# Patient Record
Sex: MALE | Race: BLACK OR AFRICAN AMERICAN | URBAN - METROPOLITAN AREA
[De-identification: names, ages, dates, MRNs, and addresses within clinical notes are randomized per-mention and may not be internally consistent; named-entity substitution may affect disease eponyms.]

---

## 2024-08-05 ENCOUNTER — PREP FOR PROCEDURE (OUTPATIENT)
Dept: ORTHOPEDIC SURGERY | Facility: CLINIC | Age: 29
End: 2024-08-05

## 2024-08-05 DIAGNOSIS — G80.0 SPASTIC QUADRIPLEGIC CEREBRAL PALSY (MULTI): Primary | ICD-10-CM

## 2024-08-05 DIAGNOSIS — G80.0 CP (CEREBRAL PALSY), SPASTIC, QUADRIPLEGIC (MULTI): Primary | ICD-10-CM

## 2024-08-06 PROBLEM — G80.0 SPASTIC QUADRIPLEGIC CEREBRAL PALSY (MULTI): Status: ACTIVE | Noted: 2024-08-05

## 2024-09-16 RX ORDER — LEVETIRACETAM 750 MG/1
1500 TABLET ORAL 2 TIMES DAILY
Status: ON HOLD | COMMUNITY

## 2024-09-16 RX ORDER — LACOSAMIDE 200 MG/1
50 TABLET ORAL 2 TIMES DAILY
Status: ON HOLD | COMMUNITY

## 2024-09-16 RX ORDER — FOLIC ACID 1 MG/1
TABLET ORAL DAILY
Status: ON HOLD | COMMUNITY

## 2024-09-16 RX ORDER — METOPROLOL TARTRATE 25 MG/1
25 TABLET, FILM COATED ORAL 2 TIMES DAILY
Status: ON HOLD | COMMUNITY

## 2024-09-16 RX ORDER — BISMUTH SUBSALICYLATE 262 MG
1 TABLET,CHEWABLE ORAL DAILY
Status: ON HOLD | COMMUNITY

## 2024-09-16 RX ORDER — IPRATROPIUM BROMIDE AND ALBUTEROL SULFATE 2.5; .5 MG/3ML; MG/3ML
3 SOLUTION RESPIRATORY (INHALATION)
Status: ON HOLD | COMMUNITY

## 2024-09-16 RX ORDER — DOCUSATE SODIUM 50 MG/5ML
50 LIQUID ORAL
Status: ON HOLD | COMMUNITY

## 2024-09-16 RX ORDER — DIAZEPAM 10 MG/100UL
1 SPRAY NASAL ONCE AS NEEDED
Status: ON HOLD | COMMUNITY

## 2024-09-16 RX ORDER — UBIDECARENONE 75 MG
500 CAPSULE ORAL DAILY
Status: ON HOLD | COMMUNITY

## 2024-09-16 RX ORDER — BISACODYL 5 MG
5 TABLET, DELAYED RELEASE (ENTERIC COATED) ORAL DAILY PRN
Status: ON HOLD | COMMUNITY

## 2024-09-16 RX ORDER — SENNOSIDES 8.8 MG/5ML
LIQUID ORAL NIGHTLY
Status: ON HOLD | COMMUNITY

## 2024-09-16 RX ORDER — ERGOCALCIFEROL 1.25 MG/1
1.25 CAPSULE ORAL WEEKLY
Status: ON HOLD | COMMUNITY

## 2024-09-16 RX ORDER — CLOBAZAM 20 MG/1
20 TABLET ORAL NIGHTLY
Status: ON HOLD | COMMUNITY

## 2024-09-16 RX ORDER — BACLOFEN 20 MG/1
TABLET ORAL 3 TIMES DAILY
Status: ON HOLD | COMMUNITY

## 2024-09-16 RX ORDER — AMLODIPINE BESYLATE 5 MG/1
7.5 TABLET ORAL DAILY
Status: ON HOLD | COMMUNITY

## 2024-09-16 ASSESSMENT — ENCOUNTER SYMPTOMS: SEIZURES: 1

## 2024-09-16 NOTE — PREPROCEDURE INSTRUCTIONS
Pre-Op Instructions &?Checklist      Your surgery has been scheduled at [facility] at [address] [place], [address], [address], in the [facility]. Parking is to the left of the main entrance.     You will be contacted about the time of your surgery the day before your surgery (if your surgery is on a Monday you will be called the Friday before surgery). If you are unable to answer the phone, a detailed voicemail message will be left. Make sure that your voicemail box is not full so a message can be left. If you have not received a call by 3:00 pm you may call [phone] between the hours of 3:00 and 4:00 pm. Please be available by phone the night before/day of surgery in case there is a change in the schedule which may require you to arrive earlier/later.     ?     14 DAYS BEFORE SURGERY STOP TAKING WEIGHT LOSS MEDICATIONS      ?     7 DAYS BEFORE SURGERY STOP THESE MEDICATIONS:     Multiple Vitamins containing Vitamin E     Herbal supplements, Fish Oil, garlic pills, turmeric, CoQ enzyme     Stop taking aspirin, and aspirin-containing products, and NSAIDs. You may continue to take Acetaminophen (Tylenol).     If you are currently taking Coumadin/Warfarin, we will have to coordinate that with your PCP &/or the Anticoagulation Clinic.           THE DAY BEFORE SURGERY:     Do not eat any food after midnight the night before surgery.      You are permitted to have no more than 4 ounces of clear liquids such as water, apple juice, plain tea or coffee (no milk or creamer), clear electrolyte-replenishing drinks such as Pedialyte, Gatorade, or Powerade (not yogurt or pulp-containing smoothies or juices such as orange juice) up to 3 hours before your arrival time.               DAY OF SURGERY TAKE THESE MEDICATIONS (if it is not listed, do not take it.)      Take: Lacosamide; levetiracetam; Epidiolex; Amlodipine; Metoprolol; Baclofen if needed.  Give Vargas a Duo-Neb breathing  treatment before leaving the house.     If taking medications in tablet/capsule form take with a small sip of water.          ON THE MORNING OF SURGERY:     *Shower either the night before your surgery or the morning of your surgery     *Do not use moisturizers, creams, lotions or perfume, or make-up.     *Wear comfortable, loose fitting clothing.      *All jewelry and valuables should be left at home.     *Prosthetic devices such as contact lenses, hearing aids, dentures, eyelash extensions, hairpins and body piercings must be removed before surgery. Bring containers for eyeglasses/contacts, dentures, or hearing aids with you.     ?     Diabetics: Please check fasting blood sugars upon waking up. ?If fasting blood sugars are <80ml/dl, please drink 100ml/3oz. of apple juice no later than 2 hours prior to surgery.     ?     ?     BRING WITH YOU:      *Photo ID and insurance card     *Current list of medicines and allergies     *Pacemaker/Defibrillator/Heart stent cards     *Copy of your complete Advanced Directive/DHPOA-if applicable     ?     SMOKING:     *Quitting smoking can make a huge difference to your health and recovery from surgery. ?     *If you need help with quitting, call 2-204-QUIT-NOW.     Alcohol:     *No alcoholic beverages for 48 hours before surgery.     ?     AFTER OUTPATIENT SURGERY:     *A responsible adult MUST accompany you at the time of discharge and stay with you for 24 hours after your surgery.     *You may NOT drive yourself home after surgery.     * You may use a taxi or ride sharing service ([employer], Uber) to return home ONLY if you are accompanied by a friend or family member     *Instructions for resuming your medications will be provided by your surgeon.     ?     CONTACT SURGEON'S OFFICE IF YOU DEVELOP:     * Fever =/>?100.4 F      * New respiratory symptoms (e.g. cough, shortness of breath, respiratory distress, sore throat)     * Recent loss of taste or smell     *Flu like symptoms  such as headache, fatigue or gastrointestinal symptoms     * If you develop any open sores, shingles, burning or painful urination      AND/OR:     * You no longer wish to have the surgery.     * Any other personal circumstances change that may lead to the need to cancel or defer this surgery.     *You were admitted to any hospital within one week of your planned procedure.     ?     If you have any questions regarding these preoperative instructions you may call [phone]. If you have questions regarding you surgical procedure, or post-operative care/recovery please call your surgeon's office.          Link to [facility] Laboratory Services Locations     [email or web address]               Link to [facility] [employer] [email or web address]

## 2024-09-16 NOTE — CPM/PAT H&P
CPM/PAT Evaluation       Name: Vargas Romero   /Age: 1995       TELEMEDICINE ENCOUNTER  Patient was interviewed by telephone for preadmission testing perioperative risk assessment prior to surgery.    DATE OF CONSULT: 2024  REFERRING PROVIDER: Dr. Korina Blackman  SURGERY, DATE, AND LENGTH: Baclofen pump replacement; 10/04/2024; 90 minutes    CHIEF COMPLAINT  Spastic quadriplegic cerebral palsy    HPI  Vargas Romero is a 29-year-old male whose medical history is provided by his mother, Leyla Romero.  Patient with spastic quadriplegic cerebral palsy, seizure disorder, and severe intellectual disability.  Patient has baclofen pump with a battery that is near its end.  Patient is scheduled for baclofen pump replacement.    ACTIVE PROBLEMS  Patient Active Problem List   Diagnosis    Spastic quadriplegic cerebral palsy (Multi)        PAST MEDICAL HISTORY  Past Medical History:   Diagnosis Date    Allergic rhinitis     Dysphagia     Essential hypertension     GERD (gastroesophageal reflux disease)     Intractable seizure disorder (Multi)     Malnutrition (Multi)     PEG (percutaneous endoscopic gastrostomy) status (Multi)     Seasonal allergies     Severe intellectual disability         SURGICAL HISTORY  Past Surgical History:   Procedure Laterality Date    OTHER SURGICAL HISTORY      Orchiectomy    OTHER SURGICAL HISTORY      Placement of baclofen pump    OTHER SURGICAL HISTORY      Tendon release    OTHER SURGICAL HISTORY      PEG        ANESTHESIA HISTORY  Denies problems with anesthesia in the past such as PONV, prolonged sedation, awareness, dental damage, aspiration, cardiac arrest, difficult intubation, or unexpected hospital admissions. Denies family history of malignant hyperthermia, or pseudocholinesterase deficiency.     SOCIAL HISTORY  Patient lives with his mother who is his primary caretaker.    FAMILY HISTORY  No family history on file.     ALLERGIES  No Known Allergies      MEDICATIONS  No current facility-administered medications for this encounter.    Current Outpatient Medications:     amLODIPine (Norvasc) 5 mg tablet, Take 1.5 tablets (7.5 mg) by mouth once daily., Disp: , Rfl:     bisacodyl (Dulcolax) 5 mg EC tablet, Take 1 tablet (5 mg) by mouth once daily as needed for constipation. Do not crush, chew, or split., Disp: , Rfl:     cloBAZam (Onfi) 20 mg tablet, Take 1 tablet (20 mg) by mouth once daily at bedtime., Disp: , Rfl:     cyanocobalamin (Vitamin B-12) 500 mcg tablet, Take 1 tablet (500 mcg) by mouth once daily., Disp: , Rfl:     docusate sodium (Colace) 50 mg/5 mL oral liquid, Take 5 mL (50 mg) by mouth., Disp: , Rfl:     ergocalciferol (Vitamin D-2) 1.25 MG (08457 UT) capsule, Take 1 capsule (1,250 mcg) by mouth 1 (one) time per week., Disp: , Rfl:     folic acid (Folvite) 1 mg tablet, Take by mouth once daily., Disp: , Rfl:     ipratropium-albuteroL (Duo-Neb) 0.5-2.5 mg/3 mL nebulizer solution, Take 3 mL by nebulization every 6 hours., Disp: , Rfl:     lacosamide (Vimpat) 200 mg tablet tablet, 0.25 tablets (50 mg) 2 times a day., Disp: , Rfl:     levETIRAcetam (Keppra) 750 mg tablet, Take 2 tablets (1,500 mg) by mouth 2 times a day., Disp: , Rfl:     metoprolol tartrate (Lopressor) 25 mg tablet, Take 1 tablet (25 mg) by mouth 2 times a day., Disp: , Rfl:     multivitamin tablet, Take 1 tablet by mouth once daily., Disp: , Rfl:     senna (Senokot) 8.8 mg/5 mL syrup, Take by mouth once daily at bedtime., Disp: , Rfl:     baclofen (Lioresal) 20 mg tablet, Take by mouth 3 times a day., Disp: , Rfl:     cannabidiol (Epidiolex) 100 mg/mL solution, Take by mouth., Disp: , Rfl:     diazePAM (Valtoco) 20 mg/2 spray spray,non-aerosol nasal spray, Administer 1 spray into each nostril 1 time if needed for seizures., Disp: , Rfl:      REVIEW OF SYSTEMS  Review of Systems   Neurological:  Positive for seizures.        Spastic quadriplegia cerebral palsy   All other systems  reviewed and are negative.    STOP BANG: patient with sleep apnea is unable to tolerate BiPAP      PHYSICAL EXAM  Deferred    AIRWAY EXAM  Deferred    VITALS  No vitals taken for telemedicine visit  BMI Readings from Last 1 Encounters:   No data found for BMI      BP Readings from Last 4 Encounters:   No data found for BP        LABS  CBC from 06/05/2024  ntains abnormal data CBC WITH DIFFERENTIAL  Order: [id number]  Component  Ref Range & Units 3 mo ago   WBC  4.5 - 11.5 K/uL 5.8   RBC  4.50 - 5.90 M/uL 4.68   Hemoglobin  13.9 - 16.3 g/dL 12.2 Low    Hematocrit  41.0 - 53.0 % 38.2 Low    MCV  80 - 100 fL 82   MCH  26.0 - 34.0 pg 26.0   MCHC  32.0 - 35.9 g/dL 31.8 Low    Platelet  150 - 400 K/uL 180   RDW-CV  11.5 - 14.5 % 13.4   MPV  7.5 - 11.2 fL 10.3   Neutrophils  31.0 - 76.0 % 61.8   Neutrophil #  1.50 - 8.00 K/uL 3.58   Lymphocytes  24.0 - 44.0 % 28.6   Lymphocytes #  1.00 - 4.80 K/uL 1.65   Monocytes  2.0 - 11.0 % 8.5   Monocyte #  0.20 - 1.00 K/uL 0.49   Eosinophil  0.1 - 4.0 % 0.8   Eosinophil #  0.00 - 0.70 K/uL 0.05   Basophils  <=1.9 % 0.3   Basophil #  0.00 - 0.20 K/uL 0.02     BMP from 06/05/2024  ntains abnormal data BASIC METABOLIC PANEL  Order: [id number]  Component  Ref Range & Units 3 mo ago   Glucose  74 - 109 mg/dL 85   Sodium  136 - 145 mmol/L 141   Potassium  3.5 - 5.0 mmol/L 4.3   Carbon Dioxide  21 - 31 mmol/L 27   Chloride  98 - 107 mmol/L 108 High    Blood Urea Nitrogen  7 - 25 mg/dL 16   Creatinine  0.70 - 1.30 mg/dL 0.55 Low    Calcium  8.6 - 10.3 mg/dL 9.4   Anion Gap  10 - 20 10   Estimated GFR (CKD-EPI)  >=60 mL/min/1.73sqm 136         ASSESSMENT/PLAN  Spastic quadriplegia cerebral palsy; baclofen pump near end-of-life battery  Replacement of baclofen pump    This note was created in part upon personal review of patient's medical records.  Speech recognition transcription software was used in the creation of this note. Despite proofreading, several typographical errors might be present  that might affect the meaning of the content.

## 2024-10-03 ENCOUNTER — ANESTHESIA EVENT (OUTPATIENT)
Dept: OPERATING ROOM | Facility: CLINIC | Age: 29
End: 2024-10-03
Payer: MEDICAID

## 2024-10-04 ENCOUNTER — SURGERY (OUTPATIENT)
Age: 29
End: 2024-10-04
Payer: MEDICAID

## 2024-10-04 ENCOUNTER — HOSPITAL ENCOUNTER (OUTPATIENT)
Facility: CLINIC | Age: 29
Setting detail: OUTPATIENT SURGERY
Discharge: HOME | End: 2024-10-04
Attending: ORTHOPAEDIC SURGERY | Admitting: ORTHOPAEDIC SURGERY
Payer: MEDICAID

## 2024-10-04 ENCOUNTER — ANESTHESIA (OUTPATIENT)
Dept: OPERATING ROOM | Facility: CLINIC | Age: 29
End: 2024-10-04
Payer: MEDICAID

## 2024-10-04 VITALS
WEIGHT: 96.12 LBS | RESPIRATION RATE: 16 BRPM | OXYGEN SATURATION: 98 % | SYSTOLIC BLOOD PRESSURE: 157 MMHG | TEMPERATURE: 97.3 F | HEART RATE: 68 BPM | DIASTOLIC BLOOD PRESSURE: 93 MMHG

## 2024-10-04 DIAGNOSIS — G80.0 SPASTIC QUADRIPLEGIC CEREBRAL PALSY (MULTI): Primary | ICD-10-CM

## 2024-10-04 PROBLEM — G82.50 QUADRIPLEGIA: Status: ACTIVE | Noted: 2024-10-04

## 2024-10-04 PROBLEM — I10 HTN (HYPERTENSION): Status: ACTIVE | Noted: 2024-10-04

## 2024-10-04 PROBLEM — R56.9 SEIZURES (MULTI): Status: ACTIVE | Noted: 2024-10-04

## 2024-10-04 PROCEDURE — C1772 INFUSION PUMP, PROGRAMMABLE: HCPCS | Performed by: ORTHOPAEDIC SURGERY

## 2024-10-04 PROCEDURE — 3600000002 HC OR TIME - INITIAL BASE CHARGE - PROCEDURE LEVEL TWO: Performed by: ORTHOPAEDIC SURGERY

## 2024-10-04 PROCEDURE — 2500000004 HC RX 250 GENERAL PHARMACY W/ HCPCS (ALT 636 FOR OP/ED): Mod: SE

## 2024-10-04 PROCEDURE — 2500000004 HC RX 250 GENERAL PHARMACY W/ HCPCS (ALT 636 FOR OP/ED): Mod: SE | Performed by: ORTHOPAEDIC SURGERY

## 2024-10-04 PROCEDURE — 2500000004 HC RX 250 GENERAL PHARMACY W/ HCPCS (ALT 636 FOR OP/ED): Mod: SE | Performed by: ANESTHESIOLOGIST ASSISTANT

## 2024-10-04 PROCEDURE — 62362 IMPLANT SPINE INFUSION PUMP: CPT | Performed by: ORTHOPAEDIC SURGERY

## 2024-10-04 PROCEDURE — A4649 SURGICAL SUPPLIES: HCPCS | Performed by: ORTHOPAEDIC SURGERY

## 2024-10-04 PROCEDURE — 7100000002 HC RECOVERY ROOM TIME - EACH INCREMENTAL 1 MINUTE: Performed by: ORTHOPAEDIC SURGERY

## 2024-10-04 PROCEDURE — 7100000010 HC PHASE TWO TIME - EACH INCREMENTAL 1 MINUTE: Performed by: ORTHOPAEDIC SURGERY

## 2024-10-04 PROCEDURE — 2720000007 HC OR 272 NO HCPCS: Performed by: ORTHOPAEDIC SURGERY

## 2024-10-04 PROCEDURE — 2780000003 HC OR 278 NO HCPCS: Performed by: ORTHOPAEDIC SURGERY

## 2024-10-04 PROCEDURE — C1751 CATH, INF, PER/CENT/MIDLINE: HCPCS | Performed by: ORTHOPAEDIC SURGERY

## 2024-10-04 PROCEDURE — 3600000007 HC OR TIME - EACH INCREMENTAL 1 MINUTE - PROCEDURE LEVEL TWO: Performed by: ORTHOPAEDIC SURGERY

## 2024-10-04 PROCEDURE — 3700000001 HC GENERAL ANESTHESIA TIME - INITIAL BASE CHARGE: Performed by: ORTHOPAEDIC SURGERY

## 2024-10-04 PROCEDURE — 7100000009 HC PHASE TWO TIME - INITIAL BASE CHARGE: Performed by: ORTHOPAEDIC SURGERY

## 2024-10-04 PROCEDURE — 3700000002 HC GENERAL ANESTHESIA TIME - EACH INCREMENTAL 1 MINUTE: Performed by: ORTHOPAEDIC SURGERY

## 2024-10-04 PROCEDURE — 7100000001 HC RECOVERY ROOM TIME - INITIAL BASE CHARGE: Performed by: ORTHOPAEDIC SURGERY

## 2024-10-04 PROCEDURE — 2500000005 HC RX 250 GENERAL PHARMACY W/O HCPCS: Mod: SE | Performed by: ORTHOPAEDIC SURGERY

## 2024-10-04 DEVICE — INFUSION PUMP, SYNCHROMED III, 40ML: Type: IMPLANTABLE DEVICE | Site: PELVIS | Status: FUNCTIONAL

## 2024-10-04 RX ORDER — FENTANYL CITRATE 50 UG/ML
50 INJECTION, SOLUTION INTRAMUSCULAR; INTRAVENOUS EVERY 5 MIN PRN
Status: DISCONTINUED | OUTPATIENT
Start: 2024-10-04 | End: 2024-10-04 | Stop reason: HOSPADM

## 2024-10-04 RX ORDER — BUPIVACAINE HYDROCHLORIDE 5 MG/ML
INJECTION, SOLUTION PERINEURAL AS NEEDED
Status: DISCONTINUED | OUTPATIENT
Start: 2024-10-04 | End: 2024-10-04 | Stop reason: HOSPADM

## 2024-10-04 RX ORDER — LABETALOL HYDROCHLORIDE 5 MG/ML
5 INJECTION, SOLUTION INTRAVENOUS ONCE AS NEEDED
Status: DISCONTINUED | OUTPATIENT
Start: 2024-10-04 | End: 2024-10-04 | Stop reason: HOSPADM

## 2024-10-04 RX ORDER — ONDANSETRON HYDROCHLORIDE 2 MG/ML
4 INJECTION, SOLUTION INTRAVENOUS ONCE AS NEEDED
Status: DISCONTINUED | OUTPATIENT
Start: 2024-10-04 | End: 2024-10-04 | Stop reason: HOSPADM

## 2024-10-04 RX ORDER — OXYCODONE HYDROCHLORIDE 5 MG/1
5 TABLET ORAL EVERY 6 HOURS PRN
Qty: 15 TABLET | Refills: 0 | Status: ON HOLD | OUTPATIENT
Start: 2024-10-04

## 2024-10-04 RX ORDER — ONDANSETRON HYDROCHLORIDE 2 MG/ML
INJECTION, SOLUTION INTRAVENOUS AS NEEDED
Status: DISCONTINUED | OUTPATIENT
Start: 2024-10-04 | End: 2024-10-04

## 2024-10-04 RX ORDER — ALBUTEROL SULFATE 0.83 MG/ML
2.5 SOLUTION RESPIRATORY (INHALATION) ONCE AS NEEDED
Status: DISCONTINUED | OUTPATIENT
Start: 2024-10-04 | End: 2024-10-04 | Stop reason: HOSPADM

## 2024-10-04 RX ORDER — CEFAZOLIN 1 G/1
INJECTION, POWDER, FOR SOLUTION INTRAVENOUS AS NEEDED
Status: DISCONTINUED | OUTPATIENT
Start: 2024-10-04 | End: 2024-10-04

## 2024-10-04 RX ORDER — ACETAMINOPHEN 325 MG/1
650 TABLET ORAL EVERY 6 HOURS PRN
Qty: 30 TABLET | Refills: 0 | Status: ON HOLD | OUTPATIENT
Start: 2024-10-04

## 2024-10-04 RX ORDER — DROPERIDOL 2.5 MG/ML
0.62 INJECTION, SOLUTION INTRAMUSCULAR; INTRAVENOUS ONCE AS NEEDED
Status: DISCONTINUED | OUTPATIENT
Start: 2024-10-04 | End: 2024-10-04 | Stop reason: HOSPADM

## 2024-10-04 RX ORDER — GLYCOPYRROLATE 0.2 MG/ML
INJECTION INTRAMUSCULAR; INTRAVENOUS AS NEEDED
Status: DISCONTINUED | OUTPATIENT
Start: 2024-10-04 | End: 2024-10-04

## 2024-10-04 RX ORDER — SODIUM CHLORIDE 0.9 G/100ML
IRRIGANT IRRIGATION AS NEEDED
Status: DISCONTINUED | OUTPATIENT
Start: 2024-10-04 | End: 2024-10-04 | Stop reason: HOSPADM

## 2024-10-04 RX ORDER — PROPOFOL 10 MG/ML
INJECTION, EMULSION INTRAVENOUS AS NEEDED
Status: DISCONTINUED | OUTPATIENT
Start: 2024-10-04 | End: 2024-10-04

## 2024-10-04 RX ORDER — MIDAZOLAM HYDROCHLORIDE 1 MG/ML
INJECTION, SOLUTION INTRAMUSCULAR; INTRAVENOUS AS NEEDED
Status: DISCONTINUED | OUTPATIENT
Start: 2024-10-04 | End: 2024-10-04

## 2024-10-04 RX ORDER — ACETAMINOPHEN 325 MG/1
650 TABLET ORAL EVERY 4 HOURS PRN
Status: DISCONTINUED | OUTPATIENT
Start: 2024-10-04 | End: 2024-10-04 | Stop reason: HOSPADM

## 2024-10-04 RX ORDER — FENTANYL CITRATE 50 UG/ML
25 INJECTION, SOLUTION INTRAMUSCULAR; INTRAVENOUS EVERY 5 MIN PRN
Status: DISCONTINUED | OUTPATIENT
Start: 2024-10-04 | End: 2024-10-04 | Stop reason: HOSPADM

## 2024-10-04 RX ORDER — FENTANYL CITRATE 50 UG/ML
INJECTION, SOLUTION INTRAMUSCULAR; INTRAVENOUS AS NEEDED
Status: DISCONTINUED | OUTPATIENT
Start: 2024-10-04 | End: 2024-10-04

## 2024-10-04 RX ORDER — SODIUM CHLORIDE, SODIUM LACTATE, POTASSIUM CHLORIDE, CALCIUM CHLORIDE 600; 310; 30; 20 MG/100ML; MG/100ML; MG/100ML; MG/100ML
100 INJECTION, SOLUTION INTRAVENOUS CONTINUOUS
Status: DISCONTINUED | OUTPATIENT
Start: 2024-10-04 | End: 2024-10-04 | Stop reason: HOSPADM

## 2024-10-04 RX ORDER — LIDOCAINE IN NACL,ISO-OSMOT/PF 30 MG/3 ML
0.1 SYRINGE (ML) INJECTION ONCE
Status: DISCONTINUED | OUTPATIENT
Start: 2024-10-04 | End: 2024-10-04 | Stop reason: HOSPADM

## 2024-10-04 RX ORDER — OXYCODONE HYDROCHLORIDE 5 MG/1
5 TABLET ORAL EVERY 4 HOURS PRN
Status: DISCONTINUED | OUTPATIENT
Start: 2024-10-04 | End: 2024-10-04 | Stop reason: HOSPADM

## 2024-10-04 RX ORDER — LIDOCAINE HYDROCHLORIDE 20 MG/ML
INJECTION, SOLUTION INFILTRATION; PERINEURAL AS NEEDED
Status: DISCONTINUED | OUTPATIENT
Start: 2024-10-04 | End: 2024-10-04

## 2024-10-04 RX ORDER — GENTAMICIN 40 MG/ML
INJECTION, SOLUTION INTRAMUSCULAR; INTRAVENOUS AS NEEDED
Status: DISCONTINUED | OUTPATIENT
Start: 2024-10-04 | End: 2024-10-04

## 2024-10-04 ASSESSMENT — ENCOUNTER SYMPTOMS: SPEECH DIFFICULTY: 1

## 2024-10-04 ASSESSMENT — PAIN SCALES - WONG BAKER
WONGBAKER_NUMERICALRESPONSE: NO HURT
WONGBAKER_NUMERICALRESPONSE: NO HURT

## 2024-10-04 ASSESSMENT — PAIN - FUNCTIONAL ASSESSMENT
PAIN_FUNCTIONAL_ASSESSMENT: 0-10
PAIN_FUNCTIONAL_ASSESSMENT: WONG-BAKER FACES
PAIN_FUNCTIONAL_ASSESSMENT: WONG-BAKER FACES

## 2024-10-04 ASSESSMENT — PAIN SCALES - GENERAL
PAINLEVEL_OUTOF10: 0 - NO PAIN

## 2024-10-04 NOTE — BRIEF OP NOTE
Date: 10/4/2024  [place] [facility] [place]    Name: Vargas Romero : 1995, Age: 29 y.o., MRN: [id number], Sex: male    Diagnosis  Pre-op Diagnosis      * Spastic quadriplegic cerebral palsy (Multi) [G80.0] Post-op Diagnosis     * Spastic quadriplegic cerebral palsy (Multi) [G80.0]     Procedures  Replacement 40 mL baclofen pump  10504 - DC IMPLTJ/RPLCMT ITHCL/EDRL DRUG NFS PRGRBL PUMP      Surgeons      * Korina Gunter - Primary  Jose Reyes MD PGY-2 - Resident    Resident/Fellow/Other Assistant:  Surgeons and Role:  * No surgeons found with a matching role *    Procedure Summary  Anesthesia: General  ASA: III  Anesthesia Staff: Anesthesiologist: Shimon Macias DO  C-AA: SEBAS Kee  Estimated Blood Loss: <5mL  Intra-op Medications: Administrations occurring from 0730 to 0845 on 10/04/24:  * No intraprocedure medications in log *           Anesthesia Record               Intraprocedure I/O Totals          Intake    LR bolus 500.00 mL    Total Intake 500 mL          Specimen: No specimens collected     Staff:   Circulator: Yesenia Burton Person: Orly          Findings: see full op note    Complications:  None; patient tolerated the procedure well.     Disposition: PACU - hemodynamically stable.  Condition: stable  Specimens Collected: No specimens collected  Attending Attestation: I was present and scrubbed for the entire procedure.    Korina Gunter  Phone Number: [phone]

## 2024-10-04 NOTE — DISCHARGE INSTRUCTIONS
Orthopaedic Surgery Discharge Instructions:  Follow-Up Instructions  You will need to be seen in clinic by Jani in 2-3 weeks for a post-operative evaluation.    You will need to call and schedule an appointment, unless there is a previous appointment that appears on your discharge instructions.  The direct orthopaedic clinic appointment line phone number is [phone].  Please do not delay in calling to make this appointment.    You should also follow up with your primary care provider in 1-2 weeks.    Activity Restrictions  1) No driving until further instructed by your orthopaedic physician, which will be addressed at your outpatient appointments.    2) No driving or operating heavy machinery while taking narcotic pain medication.    3) Resume normal activity as tolerated    Discharge Medications  You have been sent home with the following home medications: Oxycodone and Tylenol.  Please wean yourself off the oxycodone, as tolerated. A good time to take the medication is before physical therapy sessions and bedtime.     You should also take tylenol 650mg every 6 hours as needed to reduce the amount of oxycodone you need for pain.    Wound care instructions:   1) Leave operative dressing in place until PODD7 (10/11/2024). Then remove and leave incision open to air. Let water run freely over incision when showering, do not scrub. Do not soak in pool or tub.    2) Call if any drainage after 7 days, increased redness/warmth/swelling at incision site, abnormal pain/tenderness of the extremity, abnormal swelling of the extremity that does not respond to elevation, SOB/chest pain.

## 2024-10-04 NOTE — ANESTHESIA PROCEDURE NOTES
Airway  Date/Time: 10/4/2024 10:45 AM  Urgency: elective    Airway not difficult    Staffing  Performed: SEBAS   Authorized by: Shimon Macias DO    Performed by: SEBAS Kee  Patient location during procedure: OR    Indications and Patient Condition  Indications for airway management: anesthesia  Spontaneous Ventilation: absent  Sedation level: deep  Preoxygenated: yes  Patient position: sniffing  Mask difficulty assessment: 1 - vent by mask    Final Airway Details  Final airway type: supraglottic airway      Successful airway: classic  Size 5     Number of attempts at approach: 1

## 2024-10-04 NOTE — OP NOTE
Replacement 40 mL baclofen pump Operative Note     Date: 10/4/2024  OR Location: [facility] OR    Name: Vargas Romero : 1995, Age: 29 y.o., MRN: [id number], Sex: male    Diagnosis  Pre-op Diagnosis      * Spastic quadriplegic cerebral palsy (Multi) [G80.0] Post-op Diagnosis     * Spastic quadriplegic cerebral palsy (Multi) [G80.0]     Procedures  Replacement 40 mL baclofen pump  57529 - FL IMPLTJ/RPLCMT ITHCL/EDRL DRUG NFS PRGRBL PUMP      Surgeons      * Korina Gunter - Primary    Resident/Fellow/Other Assistant:  Surgeons and Role:  * No surgeons found with a matching role *    Procedure Summary  Anesthesia: General  ASA: III  Anesthesia Staff: Anesthesiologist: Shimon Macias DO  C-AA: SEBAS Kee  Estimated Blood Loss: <5 mL  Intra-op Medications: Administrations occurring from 0730 to 0845 on 10/04/24:  * No intraprocedure medications in log *           Anesthesia Record               Intraprocedure I/O Totals          Intake    LR bolus 500.00 mL    Total Intake 500 mL          Specimen: No specimens collected     Staff:   Circulator: Yesenia Burton Person: Orly         Drains and/or Catheters: * None in log *    Tourniquet Times:         Implants:  Implants       Type Name Action Serial No.      Neuro Interventional Implant INFUSION PUMP, SYNCHROMED III, 40ML - [id number] - [id number] Implanted [id number]              Findings: Sutureless connector leaking    Indications: Vargas Romero is an 29 y.o. male who is having surgery for Spastic quadriplegic cerebral palsy (Multi) [G80.0]. His pump is due for replacement so he does not go without baclofen.    The patient was seen in the preoperative area. The risks, benefits, complications, treatment options, non-operative alternatives, expected recovery and outcomes were discussed with the patient. The possibilities of reaction to medication, pulmonary aspiration, injury to surrounding structures, bleeding, recurrent infection, the  need for additional procedures, failure to diagnose a condition, and creating a complication requiring transfusion or operation were discussed with the patient. The patient concurred with the proposed plan, giving informed consent.  The site of surgery was properly noted/marked if necessary per policy. The patient has been actively warmed in preoperative area. Preoperative antibiotics were given 15 min before surgery.  Venous thrombosis prophylaxis are not indicated.    Procedure Details: The patient was brought to the operating room on a gurney and underwent general endotracheal anesthesia.  Transfer occurred to the operating table.  The abdomen awas prepped and draped in the usual sterile fashion.  A timeout was performed prior to the start of the procedure and antibiotics were given at least 15 minutes before the incision was made.  First a small incision was made in the abdomen though the old incision with a 15 blade knife.  Dissection was carried down through the soft tissues to the fascia.  The old pump was identified.  Preoperative evaluation found grateful of the catheter, but once the pocket was opened and the sutureless connector was no longer in perfect alignment with the pump, it leaked a significant amount of fluid.  The old pump was removed and disconnected from the connector.  A new attachment was felt to be necessary since that connector did not fit well on the new pump.  We cut the old catheter and attached a new sutureless connector.  That was connected to the pump and we had excellent flow through the CAP.  An anchoring stitch was placed in the 12 o'clock position and the CAP was placed in the 10 o'clock position.  The catheter was looped deep to the pump.  The pocket was closed with 0 Vicryl followed by 2-0 Vicryl and 4-0 Monocryl.  The back incision was closed with 2-0 Vicryl and 4-0 Monocryl.  Both incisions were covered with Dermabond, Steri-Strips, Xeroform, Telfa, and Tegaderm dressings.   When all of this was complete we returned to the supine position on the bed, transferred back to the [facility] and presented to the recovery room in stable condition following extubation.   Complications:  None; patient tolerated the procedure well.    Disposition: PACU - hemodynamically stable.  Condition: stable         Additional Details: Follow up in 2 weeks for wound check.  Needs a pump refill at [facility] before 11/2/24.    Attending Attestation: I was present and scrubbed for the entire procedure.    Korina Gunter  Phone Number: [phone]

## 2024-10-04 NOTE — H&P
History Of Present Illness  Vargas Romreo is a 29 y.o. male presenting with CPSQ who needs his baclofen pump replaced.     Past Medical History  Past Medical History:   Diagnosis Date    Allergic rhinitis     Dysphagia     Essential hypertension     GERD (gastroesophageal reflux disease)     Intractable seizure disorder (Multi)     Malnutrition (Multi)     PEG (percutaneous endoscopic gastrostomy) status (Multi)     Seasonal allergies     Severe intellectual disability        Surgical History  Past Surgical History:   Procedure Laterality Date    OTHER SURGICAL HISTORY      Orchiectomy    OTHER SURGICAL HISTORY      Placement of baclofen pump    OTHER SURGICAL HISTORY      Tendon release    OTHER SURGICAL HISTORY      PEG        Social History  He reports that he has never smoked. He has never used smokeless tobacco. He reports that he does not drink alcohol and does not use drugs.    Family History  No family history on file.     Allergies  Patient has no known allergies.    Review of Systems   Musculoskeletal:         Spasticity   Neurological:  Positive for speech difficulty.   All other systems reviewed and are negative.       Physical Exam  Constitutional:       Appearance: He is normal weight.   Cardiovascular:      Pulses: Normal pulses.   Musculoskeletal:      Comments: Spasticity and contractures   Skin:     General: Skin is warm and dry.   Neurological:      Mental Status: He is alert.          Last Recorded Vitals  There were no vitals taken for this visit.    Relevant Results       Assessment/Plan   Assessment & Plan  Spastic quadriplegic cerebral palsy (Multi)      Replace baclofen pump        Korina Gunter MD

## 2024-10-04 NOTE — ANESTHESIA PREPROCEDURE EVALUATION
Patient: Vargas Romero    Procedure Information       Date/Time: 10/04/24 0730    Procedure: Replacement 40 mL baclofen pump (Pelvis)    Location: [facility] [place] [facility] 03 / Virtual [facility] [place] [facility]    Surgeons: Korina Gunter MD            Relevant Problems   Anesthesia (within normal limits)      Cardiac   (+) HTN (hypertension)      Pulmonary (within normal limits)      Neuro  CP   (+) Quadriplegia   (+) Seizures (Multi) (3 weeks ago grand mal)      GI (within normal limits)  G tube      /Renal (within normal limits)      Liver (within normal limits)      Endocrine (within normal limits)      Hematology (within normal limits)      Musculoskeletal (within normal limits)      HEENT (within normal limits)      ID (within normal limits)      Skin (within normal limits)      GYN (within normal limits)       Clinical information reviewed:   Tobacco  Allergies  Meds  Problems  Med Hx  Surg Hx   Fam Hx  Soc   Hx        NPO Detail:  NPO/Void Status  Date of Last Liquid: 10/03/24 (Simultaneous filing. User may not have seen previous data.)  Time of Last Liquid: 2000 (Simultaneous filing. User may not have seen previous data.)  Date of Last Solid: 10/03/24 (Simultaneous filing. User may not have seen previous data.)  Time of Last Solid: 2000 (Simultaneous filing. User may not have seen previous data.)         Physical Exam    Airway  Mallampati: II     Cardiovascular    Dental - normal exam     Pulmonary    Abdominal        Anesthesia Plan    History of general anesthesia?: yes  History of complications of general anesthesia?: no    ASA 3     general     intravenous induction   Anesthetic plan and risks discussed with patient and mother.    Plan discussed with CAA.

## 2024-10-04 NOTE — ANESTHESIA POSTPROCEDURE EVALUATION
Patient: Vargas Romero    Procedure Summary       Date: 10/04/24 Room / [place] [facility] [place] [facility] [place] [facility] [place] [facility] [place] [facility] [place]    Anesthesia Start: 1040 Anesthesia Stop: 1213    Procedure: Replacement 40 mL baclofen pump (Pelvis) Diagnosis:       Spastic quadriplegic cerebral palsy (Multi)      (Spastic quadriplegic cerebral palsy (Multi) [G80.0])    Surgeons: Korina Gunter MD Responsible Provider: Shimon Macias DO    Anesthesia Type: general ASA Status: 3            Anesthesia Type: general    Vitals Value Taken Time   /93 10/04/24 1246   Temp 36.2 °C (97.2 °F) 10/04/24 1246   Pulse 52 10/04/24 1246   Resp 16 10/04/24 1246   SpO2 100 % 10/04/24 1246       Anesthesia Post Evaluation    Patient location during evaluation: PACU  Patient participation: complete - patient cannot participate  Level of consciousness: awake  Pain management: satisfactory to patient  Multimodal analgesia pain management approach  Airway patency: patent  Cardiovascular status: acceptable  Respiratory status: acceptable  Hydration status: acceptable  Postoperative Nausea and Vomiting: none    There were no known notable events for this encounter.

## 2024-10-05 ENCOUNTER — APPOINTMENT (OUTPATIENT)
Dept: RADIOLOGY | Facility: HOSPITAL | Age: 29
End: 2024-10-05
Payer: MEDICAID

## 2024-10-05 ENCOUNTER — APPOINTMENT (OUTPATIENT)
Dept: CARDIOLOGY | Facility: HOSPITAL | Age: 29
End: 2024-10-05
Payer: MEDICAID

## 2024-10-05 ENCOUNTER — HOSPITAL ENCOUNTER (INPATIENT)
Facility: HOSPITAL | Age: 29
End: 2024-10-05
Attending: EMERGENCY MEDICINE | Admitting: INTERNAL MEDICINE
Payer: MEDICAID

## 2024-10-05 DIAGNOSIS — R41.82 ALTERED MENTAL STATUS, UNSPECIFIED ALTERED MENTAL STATUS TYPE: ICD-10-CM

## 2024-10-05 DIAGNOSIS — G82.50 QUADRIPLEGIA: ICD-10-CM

## 2024-10-05 DIAGNOSIS — J96.01 ACUTE HYPOXIC RESPIRATORY FAILURE (MULTI): ICD-10-CM

## 2024-10-05 LAB
ALBUMIN SERPL BCP-MCNC: 4.3 G/DL (ref 3.4–5)
ALP SERPL-CCNC: 74 U/L (ref 33–120)
ALT SERPL W P-5'-P-CCNC: 35 U/L (ref 10–52)
ANION GAP BLDV CALCULATED.4IONS-SCNC: 5 MMOL/L (ref 10–25)
ANION GAP SERPL CALC-SCNC: 13 MMOL/L (ref 10–20)
AST SERPL W P-5'-P-CCNC: 58 U/L (ref 9–39)
BASE EXCESS BLDV CALC-SCNC: 6.2 MMOL/L (ref -2–3)
BILIRUB SERPL-MCNC: 0.4 MG/DL (ref 0–1.2)
BNP SERPL-MCNC: 24 PG/ML (ref 0–99)
BODY TEMPERATURE: 37 DEGREES CELSIUS
BUN SERPL-MCNC: 7 MG/DL (ref 6–23)
CA-I BLDV-SCNC: 1.28 MMOL/L (ref 1.1–1.33)
CALCIUM SERPL-MCNC: 9.3 MG/DL (ref 8.6–10.3)
CARDIAC TROPONIN I PNL SERPL HS: 4 NG/L (ref 0–20)
CHLORIDE BLDV-SCNC: 103 MMOL/L (ref 98–107)
CHLORIDE SERPL-SCNC: 101 MMOL/L (ref 98–107)
CO2 SERPL-SCNC: 29 MMOL/L (ref 21–32)
CREAT SERPL-MCNC: 0.65 MG/DL (ref 0.5–1.3)
EGFRCR SERPLBLD CKD-EPI 2021: >90 ML/MIN/1.73M*2
GLUCOSE BLDV-MCNC: 143 MG/DL (ref 74–99)
GLUCOSE SERPL-MCNC: 131 MG/DL (ref 74–99)
HCO3 BLDV-SCNC: 33.5 MMOL/L (ref 22–26)
HCT VFR BLD EST: 44 % (ref 41–52)
HGB BLDV-MCNC: 14.8 G/DL (ref 13.5–17.5)
INHALED O2 CONCENTRATION: 21 %
LACTATE BLDV-SCNC: 3.6 MMOL/L (ref 0.4–2)
MAGNESIUM SERPL-MCNC: 1.9 MG/DL (ref 1.6–2.4)
OXYHGB MFR BLDV: 63.3 % (ref 45–75)
PCO2 BLDV: 58 MM HG (ref 41–51)
PH BLDV: 7.37 PH (ref 7.33–7.43)
PO2 BLDV: 44 MM HG (ref 35–45)
POTASSIUM BLDV-SCNC: 3.9 MMOL/L (ref 3.5–5.3)
POTASSIUM SERPL-SCNC: 3.7 MMOL/L (ref 3.5–5.3)
PROT SERPL-MCNC: 7.6 G/DL (ref 6.4–8.2)
SAO2 % BLDV: 64 % (ref 45–75)
SODIUM BLDV-SCNC: 138 MMOL/L (ref 136–145)
SODIUM SERPL-SCNC: 139 MMOL/L (ref 136–145)

## 2024-10-05 PROCEDURE — 93005 ELECTROCARDIOGRAM TRACING: CPT

## 2024-10-05 PROCEDURE — 36415 COLL VENOUS BLD VENIPUNCTURE: CPT | Performed by: EMERGENCY MEDICINE

## 2024-10-05 PROCEDURE — 96365 THER/PROPH/DIAG IV INF INIT: CPT

## 2024-10-05 PROCEDURE — 87040 BLOOD CULTURE FOR BACTERIA: CPT | Mod: AHULAB | Performed by: EMERGENCY MEDICINE

## 2024-10-05 PROCEDURE — 84132 ASSAY OF SERUM POTASSIUM: CPT | Performed by: EMERGENCY MEDICINE

## 2024-10-05 PROCEDURE — 83735 ASSAY OF MAGNESIUM: CPT | Performed by: EMERGENCY MEDICINE

## 2024-10-05 PROCEDURE — 87636 SARSCOV2 & INF A&B AMP PRB: CPT | Performed by: EMERGENCY MEDICINE

## 2024-10-05 PROCEDURE — 84484 ASSAY OF TROPONIN QUANT: CPT | Performed by: EMERGENCY MEDICINE

## 2024-10-05 PROCEDURE — 83880 ASSAY OF NATRIURETIC PEPTIDE: CPT | Performed by: EMERGENCY MEDICINE

## 2024-10-05 PROCEDURE — 85025 COMPLETE CBC W/AUTO DIFF WBC: CPT | Performed by: EMERGENCY MEDICINE

## 2024-10-05 PROCEDURE — 2500000004 HC RX 250 GENERAL PHARMACY W/ HCPCS (ALT 636 FOR OP/ED): Performed by: EMERGENCY MEDICINE

## 2024-10-05 PROCEDURE — 80053 COMPREHEN METABOLIC PANEL: CPT | Performed by: EMERGENCY MEDICINE

## 2024-10-05 PROCEDURE — 99285 EMERGENCY DEPT VISIT HI MDM: CPT

## 2024-10-05 PROCEDURE — 71045 X-RAY EXAM CHEST 1 VIEW: CPT | Performed by: STUDENT IN AN ORGANIZED HEALTH CARE EDUCATION/TRAINING PROGRAM

## 2024-10-05 PROCEDURE — 82435 ASSAY OF BLOOD CHLORIDE: CPT | Performed by: EMERGENCY MEDICINE

## 2024-10-05 PROCEDURE — 71045 X-RAY EXAM CHEST 1 VIEW: CPT

## 2024-10-05 RX ADMIN — SODIUM CHLORIDE, POTASSIUM CHLORIDE, SODIUM LACTATE AND CALCIUM CHLORIDE 1000 ML: 600; 310; 30; 20 INJECTION, SOLUTION INTRAVENOUS at 23:40

## 2024-10-05 RX ADMIN — PIPERACILLIN SODIUM AND TAZOBACTAM SODIUM 4.5 G: 4; .5 INJECTION, SOLUTION INTRAVENOUS at 23:40

## 2024-10-05 ASSESSMENT — COLUMBIA-SUICIDE SEVERITY RATING SCALE - C-SSRS
1. IN THE PAST MONTH, HAVE YOU WISHED YOU WERE DEAD OR WISHED YOU COULD GO TO SLEEP AND NOT WAKE UP?: NO
6. HAVE YOU EVER DONE ANYTHING, STARTED TO DO ANYTHING, OR PREPARED TO DO ANYTHING TO END YOUR LIFE?: NO
2. HAVE YOU ACTUALLY HAD ANY THOUGHTS OF KILLING YOURSELF?: NO

## 2024-10-06 ENCOUNTER — APPOINTMENT (OUTPATIENT)
Dept: RADIOLOGY | Facility: HOSPITAL | Age: 29
End: 2024-10-06
Payer: MEDICAID

## 2024-10-06 ENCOUNTER — APPOINTMENT (OUTPATIENT)
Dept: CARDIOLOGY | Facility: HOSPITAL | Age: 29
End: 2024-10-06
Payer: MEDICAID

## 2024-10-06 VITALS
HEIGHT: 63 IN | RESPIRATION RATE: 12 BRPM | HEART RATE: 118 BPM | SYSTOLIC BLOOD PRESSURE: 165 MMHG | BODY MASS INDEX: 17.15 KG/M2 | OXYGEN SATURATION: 100 % | WEIGHT: 96.78 LBS | TEMPERATURE: 99.1 F | DIASTOLIC BLOOD PRESSURE: 92 MMHG

## 2024-10-06 LAB
ANION GAP SERPL CALC-SCNC: 12 MMOL/L (ref 10–20)
APPEARANCE UR: CLEAR
BACTERIA BLD CULT: NORMAL
BACTERIA BLD CULT: NORMAL
BASOPHILS # BLD AUTO: 0 X10*3/UL (ref 0–0.1)
BASOPHILS NFR BLD AUTO: 0 %
BILIRUB UR STRIP.AUTO-MCNC: NEGATIVE MG/DL
BUN SERPL-MCNC: 6 MG/DL (ref 6–23)
CALCIUM SERPL-MCNC: 8.2 MG/DL (ref 8.6–10.3)
CARDIAC TROPONIN I PNL SERPL HS: 5 NG/L (ref 0–20)
CHLORIDE SERPL-SCNC: 104 MMOL/L (ref 98–107)
CO2 SERPL-SCNC: 28 MMOL/L (ref 21–32)
COLOR UR: COLORLESS
CREAT SERPL-MCNC: 0.59 MG/DL (ref 0.5–1.3)
EGFRCR SERPLBLD CKD-EPI 2021: >90 ML/MIN/1.73M*2
EOSINOPHIL # BLD AUTO: 0 X10*3/UL (ref 0–0.7)
EOSINOPHIL NFR BLD AUTO: 0 %
ERYTHROCYTE [DISTWIDTH] IN BLOOD BY AUTOMATED COUNT: 13.5 % (ref 11.5–14.5)
ERYTHROCYTE [DISTWIDTH] IN BLOOD BY AUTOMATED COUNT: 13.6 % (ref 11.5–14.5)
FLUAV RNA RESP QL NAA+PROBE: NOT DETECTED
FLUBV RNA RESP QL NAA+PROBE: NOT DETECTED
GLUCOSE SERPL-MCNC: 106 MG/DL (ref 74–99)
GLUCOSE UR STRIP.AUTO-MCNC: ABNORMAL MG/DL
HCT VFR BLD AUTO: 37.6 % (ref 41–52)
HCT VFR BLD AUTO: 45.4 % (ref 41–52)
HGB BLD-MCNC: 11.7 G/DL (ref 13.5–17.5)
HGB BLD-MCNC: 14.3 G/DL (ref 13.5–17.5)
HOLD SPECIMEN: NORMAL
IMM GRANULOCYTES # BLD AUTO: 0.01 X10*3/UL (ref 0–0.7)
IMM GRANULOCYTES NFR BLD AUTO: 0.2 % (ref 0–0.9)
KETONES UR STRIP.AUTO-MCNC: NEGATIVE MG/DL
LACTATE SERPL-SCNC: 1.9 MMOL/L (ref 0.4–2)
LACTATE SERPL-SCNC: 2.3 MMOL/L (ref 0.4–2)
LACTATE SERPL-SCNC: 2.4 MMOL/L (ref 0.4–2)
LACTATE SERPL-SCNC: 2.8 MMOL/L (ref 0.4–2)
LEUKOCYTE ESTERASE UR QL STRIP.AUTO: NEGATIVE
LYMPHOCYTES # BLD AUTO: 0.77 X10*3/UL (ref 1.2–4.8)
LYMPHOCYTES NFR BLD AUTO: 14.6 %
MCH RBC QN AUTO: 26.4 PG (ref 26–34)
MCH RBC QN AUTO: 26.7 PG (ref 26–34)
MCHC RBC AUTO-ENTMCNC: 31.1 G/DL (ref 32–36)
MCHC RBC AUTO-ENTMCNC: 31.5 G/DL (ref 32–36)
MCV RBC AUTO: 85 FL (ref 80–100)
MCV RBC AUTO: 85 FL (ref 80–100)
MONOCYTES # BLD AUTO: 0.69 X10*3/UL (ref 0.1–1)
MONOCYTES NFR BLD AUTO: 13.1 %
NEUTROPHILS # BLD AUTO: 3.8 X10*3/UL (ref 1.2–7.7)
NEUTROPHILS NFR BLD AUTO: 72.1 %
NITRITE UR QL STRIP.AUTO: NEGATIVE
NRBC BLD-RTO: 0 /100 WBCS (ref 0–0)
NRBC BLD-RTO: 0 /100 WBCS (ref 0–0)
PH UR STRIP.AUTO: 6.5 [PH]
PLATELET # BLD AUTO: 129 X10*3/UL (ref 150–450)
PLATELET # BLD AUTO: 159 X10*3/UL (ref 150–450)
POTASSIUM SERPL-SCNC: 3.7 MMOL/L (ref 3.5–5.3)
PROT UR STRIP.AUTO-MCNC: NEGATIVE MG/DL
RBC # BLD AUTO: 4.44 X10*6/UL (ref 4.5–5.9)
RBC # BLD AUTO: 5.35 X10*6/UL (ref 4.5–5.9)
RBC # UR STRIP.AUTO: NEGATIVE /UL
RBC MORPH BLD: NORMAL
SARS-COV-2 RNA RESP QL NAA+PROBE: NOT DETECTED
SODIUM SERPL-SCNC: 140 MMOL/L (ref 136–145)
SP GR UR STRIP.AUTO: 1.04
UROBILINOGEN UR STRIP.AUTO-MCNC: NORMAL MG/DL
WBC # BLD AUTO: 2.5 X10*3/UL (ref 4.4–11.3)
WBC # BLD AUTO: 5.3 X10*3/UL (ref 4.4–11.3)

## 2024-10-06 PROCEDURE — 99222 1ST HOSP IP/OBS MODERATE 55: CPT | Performed by: INTERNAL MEDICINE

## 2024-10-06 PROCEDURE — 36415 COLL VENOUS BLD VENIPUNCTURE: CPT | Performed by: EMERGENCY MEDICINE

## 2024-10-06 PROCEDURE — 84484 ASSAY OF TROPONIN QUANT: CPT | Performed by: EMERGENCY MEDICINE

## 2024-10-06 PROCEDURE — 99255 IP/OBS CONSLTJ NEW/EST HI 80: CPT | Performed by: INTERNAL MEDICINE

## 2024-10-06 PROCEDURE — 70450 CT HEAD/BRAIN W/O DYE: CPT | Performed by: RADIOLOGY

## 2024-10-06 PROCEDURE — 80048 BASIC METABOLIC PNL TOTAL CA: CPT | Performed by: INTERNAL MEDICINE

## 2024-10-06 PROCEDURE — 96367 TX/PROPH/DG ADDL SEQ IV INF: CPT | Mod: 59

## 2024-10-06 PROCEDURE — 74177 CT ABD & PELVIS W/CONTRAST: CPT

## 2024-10-06 PROCEDURE — 2500000005 HC RX 250 GENERAL PHARMACY W/O HCPCS: Performed by: EMERGENCY MEDICINE

## 2024-10-06 PROCEDURE — 93005 ELECTROCARDIOGRAM TRACING: CPT

## 2024-10-06 PROCEDURE — 2500000002 HC RX 250 W HCPCS SELF ADMINISTERED DRUGS (ALT 637 FOR MEDICARE OP, ALT 636 FOR OP/ED): Performed by: INTERNAL MEDICINE

## 2024-10-06 PROCEDURE — 94640 AIRWAY INHALATION TREATMENT: CPT

## 2024-10-06 PROCEDURE — 83605 ASSAY OF LACTIC ACID: CPT | Performed by: EMERGENCY MEDICINE

## 2024-10-06 PROCEDURE — 83605 ASSAY OF LACTIC ACID: CPT | Performed by: HOSPITALIST

## 2024-10-06 PROCEDURE — 81003 URINALYSIS AUTO W/O SCOPE: CPT | Performed by: EMERGENCY MEDICINE

## 2024-10-06 PROCEDURE — 2500000004 HC RX 250 GENERAL PHARMACY W/ HCPCS (ALT 636 FOR OP/ED): Performed by: EMERGENCY MEDICINE

## 2024-10-06 PROCEDURE — 84145 PROCALCITONIN (PCT): CPT | Mod: AHULAB | Performed by: HOSPITALIST

## 2024-10-06 PROCEDURE — 71045 X-RAY EXAM CHEST 1 VIEW: CPT

## 2024-10-06 PROCEDURE — 87899 AGENT NOS ASSAY W/OPTIC: CPT | Mod: AHULAB | Performed by: INTERNAL MEDICINE

## 2024-10-06 PROCEDURE — 2500000001 HC RX 250 WO HCPCS SELF ADMINISTERED DRUGS (ALT 637 FOR MEDICARE OP): Performed by: INTERNAL MEDICINE

## 2024-10-06 PROCEDURE — 31720 CLEARANCE OF AIRWAYS: CPT

## 2024-10-06 PROCEDURE — 2550000001 HC RX 255 CONTRASTS: Performed by: EMERGENCY MEDICINE

## 2024-10-06 PROCEDURE — 93010 ELECTROCARDIOGRAM REPORT: CPT | Performed by: STUDENT IN AN ORGANIZED HEALTH CARE EDUCATION/TRAINING PROGRAM

## 2024-10-06 PROCEDURE — 71045 X-RAY EXAM CHEST 1 VIEW: CPT | Performed by: RADIOLOGY

## 2024-10-06 PROCEDURE — 74177 CT ABD & PELVIS W/CONTRAST: CPT | Performed by: RADIOLOGY

## 2024-10-06 PROCEDURE — 70450 CT HEAD/BRAIN W/O DYE: CPT

## 2024-10-06 PROCEDURE — 87081 CULTURE SCREEN ONLY: CPT | Mod: AHULAB | Performed by: INTERNAL MEDICINE

## 2024-10-06 PROCEDURE — 87449 NOS EACH ORGANISM AG IA: CPT | Mod: AHULAB | Performed by: INTERNAL MEDICINE

## 2024-10-06 PROCEDURE — 2500000004 HC RX 250 GENERAL PHARMACY W/ HCPCS (ALT 636 FOR OP/ED): Performed by: STUDENT IN AN ORGANIZED HEALTH CARE EDUCATION/TRAINING PROGRAM

## 2024-10-06 PROCEDURE — 85027 COMPLETE CBC AUTOMATED: CPT | Performed by: INTERNAL MEDICINE

## 2024-10-06 PROCEDURE — 2500000005 HC RX 250 GENERAL PHARMACY W/O HCPCS: Performed by: INTERNAL MEDICINE

## 2024-10-06 PROCEDURE — 71275 CT ANGIOGRAPHY CHEST: CPT

## 2024-10-06 PROCEDURE — 2500000004 HC RX 250 GENERAL PHARMACY W/ HCPCS (ALT 636 FOR OP/ED): Performed by: HOSPITALIST

## 2024-10-06 PROCEDURE — 71275 CT ANGIOGRAPHY CHEST: CPT | Performed by: RADIOLOGY

## 2024-10-06 PROCEDURE — 2500000004 HC RX 250 GENERAL PHARMACY W/ HCPCS (ALT 636 FOR OP/ED): Performed by: INTERNAL MEDICINE

## 2024-10-06 PROCEDURE — 1100000001 HC PRIVATE ROOM DAILY

## 2024-10-06 RX ORDER — SODIUM CHLORIDE 9 MG/ML
100 INJECTION, SOLUTION INTRAVENOUS CONTINUOUS
Status: ACTIVE | OUTPATIENT
Start: 2024-10-06

## 2024-10-06 RX ORDER — LEVETIRACETAM 500 MG/1
1500 TABLET ORAL 2 TIMES DAILY
Status: DISPENSED | OUTPATIENT
Start: 2024-10-06

## 2024-10-06 RX ORDER — SODIUM CHLORIDE 9 MG/ML
100 INJECTION, SOLUTION INTRAVENOUS CONTINUOUS
Status: DISCONTINUED | OUTPATIENT
Start: 2024-10-06 | End: 2024-10-06

## 2024-10-06 RX ORDER — PANTOPRAZOLE SODIUM 40 MG/10ML
40 INJECTION, POWDER, LYOPHILIZED, FOR SOLUTION INTRAVENOUS
Status: DISPENSED | OUTPATIENT
Start: 2024-10-06

## 2024-10-06 RX ORDER — ONDANSETRON HYDROCHLORIDE 2 MG/ML
4 INJECTION, SOLUTION INTRAVENOUS EVERY 8 HOURS PRN
Status: ACTIVE | OUTPATIENT
Start: 2024-10-06

## 2024-10-06 RX ORDER — ACETAMINOPHEN 325 MG/1
650 TABLET ORAL EVERY 4 HOURS PRN
Status: ACTIVE | OUTPATIENT
Start: 2024-10-06

## 2024-10-06 RX ORDER — UBIDECARENONE 75 MG
500 CAPSULE ORAL DAILY
Status: DISPENSED | OUTPATIENT
Start: 2024-10-06

## 2024-10-06 RX ORDER — DOCUSATE SODIUM 50 MG/5ML
50 LIQUID ORAL 2 TIMES DAILY
Status: DISPENSED | OUTPATIENT
Start: 2024-10-06

## 2024-10-06 RX ORDER — CLOBAZAM 10 MG/1
10 TABLET ORAL NIGHTLY
Status: DISPENSED | OUTPATIENT
Start: 2024-10-06

## 2024-10-06 RX ORDER — IPRATROPIUM BROMIDE AND ALBUTEROL SULFATE 2.5; .5 MG/3ML; MG/3ML
3 SOLUTION RESPIRATORY (INHALATION) EVERY 2 HOUR PRN
Status: ACTIVE | OUTPATIENT
Start: 2024-10-06

## 2024-10-06 RX ORDER — ONDANSETRON 4 MG/1
4 TABLET, FILM COATED ORAL EVERY 8 HOURS PRN
Status: ACTIVE | OUTPATIENT
Start: 2024-10-06

## 2024-10-06 RX ORDER — METOPROLOL TARTRATE 1 MG/ML
5 INJECTION, SOLUTION INTRAVENOUS ONCE
Status: COMPLETED | OUTPATIENT
Start: 2024-10-06 | End: 2024-10-06

## 2024-10-06 RX ORDER — ACETAMINOPHEN 650 MG/1
650 SUPPOSITORY RECTAL EVERY 4 HOURS PRN
Status: DISPENSED | OUTPATIENT
Start: 2024-10-06

## 2024-10-06 RX ORDER — SENNOSIDES 8.8 MG/5ML
5 LIQUID ORAL NIGHTLY
Status: DISPENSED | OUTPATIENT
Start: 2024-10-06

## 2024-10-06 RX ORDER — METOPROLOL TARTRATE 25 MG/1
25 TABLET, FILM COATED ORAL 2 TIMES DAILY
Status: DISPENSED | OUTPATIENT
Start: 2024-10-06

## 2024-10-06 RX ORDER — BISACODYL 5 MG
5 TABLET, DELAYED RELEASE (ENTERIC COATED) ORAL DAILY PRN
Status: ACTIVE | OUTPATIENT
Start: 2024-10-06

## 2024-10-06 RX ORDER — PANTOPRAZOLE SODIUM 40 MG/1
40 TABLET, DELAYED RELEASE ORAL
Status: ACTIVE | OUTPATIENT
Start: 2024-10-06

## 2024-10-06 RX ORDER — FOLIC ACID 1 MG/1
1 TABLET ORAL DAILY
Status: DISPENSED | OUTPATIENT
Start: 2024-10-06

## 2024-10-06 RX ORDER — ACETAMINOPHEN 160 MG/5ML
650 SUSPENSION ORAL EVERY 4 HOURS PRN
Status: ACTIVE | OUTPATIENT
Start: 2024-10-06

## 2024-10-06 RX ORDER — HEPARIN SODIUM 5000 [USP'U]/ML
5000 INJECTION, SOLUTION INTRAVENOUS; SUBCUTANEOUS EVERY 8 HOURS SCHEDULED
Status: DISPENSED | OUTPATIENT
Start: 2024-10-06

## 2024-10-06 RX ORDER — IPRATROPIUM BROMIDE AND ALBUTEROL SULFATE 2.5; .5 MG/3ML; MG/3ML
3 SOLUTION RESPIRATORY (INHALATION)
Status: DISPENSED | OUTPATIENT
Start: 2024-10-06

## 2024-10-06 RX ORDER — OXYCODONE HYDROCHLORIDE 5 MG/1
5 TABLET ORAL EVERY 6 HOURS PRN
Status: ACTIVE | OUTPATIENT
Start: 2024-10-06

## 2024-10-06 RX ORDER — LACOSAMIDE 50 MG/1
50 TABLET ORAL 2 TIMES DAILY
Status: DISPENSED | OUTPATIENT
Start: 2024-10-06

## 2024-10-06 RX ORDER — CLOBAZAM 10 MG/1
20 TABLET ORAL NIGHTLY
Status: DISCONTINUED | OUTPATIENT
Start: 2024-10-06 | End: 2024-10-06

## 2024-10-06 RX ORDER — ERGOCALCIFEROL 1.25 MG/1
1250 CAPSULE ORAL WEEKLY
Status: DISPENSED | OUTPATIENT
Start: 2024-10-06

## 2024-10-06 RX ADMIN — PANTOPRAZOLE SODIUM 40 MG: 40 INJECTION, POWDER, FOR SOLUTION INTRAVENOUS at 11:20

## 2024-10-06 RX ADMIN — DOCUSATE SODIUM 50 MG: 50 LIQUID ORAL at 11:08

## 2024-10-06 RX ADMIN — CLOBAZAM 10 MG: 10 TABLET ORAL at 23:40

## 2024-10-06 RX ADMIN — IPRATROPIUM BROMIDE AND ALBUTEROL SULFATE 3 ML: 2.5; .5 SOLUTION RESPIRATORY (INHALATION) at 02:39

## 2024-10-06 RX ADMIN — ERGOCALCIFEROL 1250 MCG: 1.25 CAPSULE ORAL at 11:05

## 2024-10-06 RX ADMIN — HEPARIN SODIUM 5000 UNITS: 5000 INJECTION INTRAVENOUS; SUBCUTANEOUS at 14:57

## 2024-10-06 RX ADMIN — LEVETIRACETAM 1500 MG: 500 TABLET, FILM COATED ORAL at 03:42

## 2024-10-06 RX ADMIN — IPRATROPIUM BROMIDE AND ALBUTEROL SULFATE 3 ML: 2.5; .5 SOLUTION RESPIRATORY (INHALATION) at 08:03

## 2024-10-06 RX ADMIN — PIPERACILLIN SODIUM AND TAZOBACTAM SODIUM 3.38 G: 3; .375 INJECTION, SOLUTION INTRAVENOUS at 16:45

## 2024-10-06 RX ADMIN — DOCUSATE SODIUM 50 MG: 50 LIQUID ORAL at 20:30

## 2024-10-06 RX ADMIN — LACOSAMIDE 50 MG: 50 TABLET, FILM COATED ORAL at 11:05

## 2024-10-06 RX ADMIN — FOLIC ACID 1 MG: 1 TABLET ORAL at 11:05

## 2024-10-06 RX ADMIN — Medication 7 L/MIN: at 00:16

## 2024-10-06 RX ADMIN — ACETAMINOPHEN 650 MG: 650 SUPPOSITORY RECTAL at 16:15

## 2024-10-06 RX ADMIN — AZITHROMYCIN MONOHYDRATE 500 MG: 500 INJECTION, POWDER, LYOPHILIZED, FOR SOLUTION INTRAVENOUS at 00:11

## 2024-10-06 RX ADMIN — METOPROLOL TARTRATE 25 MG: 25 TABLET, FILM COATED ORAL at 23:39

## 2024-10-06 RX ADMIN — LEVETIRACETAM 1500 MG: 500 TABLET, FILM COATED ORAL at 20:31

## 2024-10-06 RX ADMIN — SENNOSIDES 5 ML: 8.8 LIQUID ORAL at 20:53

## 2024-10-06 RX ADMIN — SODIUM CHLORIDE 100 ML/HR: 9 INJECTION, SOLUTION INTRAVENOUS at 16:33

## 2024-10-06 RX ADMIN — Medication 13 L/MIN: at 08:00

## 2024-10-06 RX ADMIN — HEPARIN SODIUM 5000 UNITS: 5000 INJECTION INTRAVENOUS; SUBCUTANEOUS at 23:39

## 2024-10-06 RX ADMIN — IOHEXOL 75 ML: 350 INJECTION, SOLUTION INTRAVENOUS at 00:41

## 2024-10-06 RX ADMIN — ACETAMINOPHEN 650 MG: 650 SUPPOSITORY RECTAL at 20:46

## 2024-10-06 RX ADMIN — LEVETIRACETAM 1500 MG: 500 TABLET, FILM COATED ORAL at 11:04

## 2024-10-06 RX ADMIN — GLYCOPYRROLATE 0.2 MG: 0.2 INJECTION, SOLUTION INTRAMUSCULAR; INTRAVITREAL at 18:49

## 2024-10-06 RX ADMIN — SODIUM CHLORIDE 100 ML/HR: 9 INJECTION, SOLUTION INTRAVENOUS at 07:02

## 2024-10-06 RX ADMIN — PIPERACILLIN SODIUM AND TAZOBACTAM SODIUM 3.38 G: 3; .375 INJECTION, SOLUTION INTRAVENOUS at 11:02

## 2024-10-06 RX ADMIN — METOPROLOL TARTRATE 25 MG: 25 TABLET, FILM COATED ORAL at 03:42

## 2024-10-06 RX ADMIN — PIPERACILLIN SODIUM AND TAZOBACTAM SODIUM 3.38 G: 3; .375 INJECTION, SOLUTION INTRAVENOUS at 23:39

## 2024-10-06 RX ADMIN — METOPROLOL TARTRATE 5 MG: 5 INJECTION INTRAVENOUS at 20:30

## 2024-10-06 RX ADMIN — DOCUSATE SODIUM 50 MG: 50 LIQUID ORAL at 04:09

## 2024-10-06 RX ADMIN — SODIUM CHLORIDE 100 ML/HR: 9 INJECTION, SOLUTION INTRAVENOUS at 04:03

## 2024-10-06 RX ADMIN — LACOSAMIDE 50 MG: 50 TABLET, FILM COATED ORAL at 20:30

## 2024-10-06 RX ADMIN — HEPARIN SODIUM 5000 UNITS: 5000 INJECTION INTRAVENOUS; SUBCUTANEOUS at 09:40

## 2024-10-06 RX ADMIN — IPRATROPIUM BROMIDE AND ALBUTEROL SULFATE 3 ML: 2.5; .5 SOLUTION RESPIRATORY (INHALATION) at 19:02

## 2024-10-06 RX ADMIN — METOPROLOL TARTRATE 25 MG: 25 TABLET, FILM COATED ORAL at 11:05

## 2024-10-06 RX ADMIN — CYANOCOBALAMIN TAB 500 MCG 500 MCG: 500 TAB at 11:05

## 2024-10-06 RX ADMIN — IPRATROPIUM BROMIDE AND ALBUTEROL SULFATE 3 ML: 2.5; .5 SOLUTION RESPIRATORY (INHALATION) at 14:30

## 2024-10-06 RX ADMIN — AZITHROMYCIN MONOHYDRATE 500 MG: 500 INJECTION, POWDER, LYOPHILIZED, FOR SOLUTION INTRAVENOUS at 23:39

## 2024-10-06 RX ADMIN — AMLODIPINE BESYLATE 7.5 MG: 5 TABLET ORAL at 11:05

## 2024-10-06 RX ADMIN — Medication 8 L/MIN: at 19:02

## 2024-10-06 ASSESSMENT — COGNITIVE AND FUNCTIONAL STATUS - GENERAL
EATING MEALS: TOTAL
STANDING UP FROM CHAIR USING ARMS: TOTAL
TURNING FROM BACK TO SIDE WHILE IN FLAT BAD: TOTAL
DAILY ACTIVITIY SCORE: 6
TURNING FROM BACK TO SIDE WHILE IN FLAT BAD: TOTAL
WALKING IN HOSPITAL ROOM: TOTAL
PERSONAL GROOMING: TOTAL
DRESSING REGULAR LOWER BODY CLOTHING: TOTAL
EATING MEALS: TOTAL
PERSONAL GROOMING: TOTAL
HELP NEEDED FOR BATHING: TOTAL
MOBILITY SCORE: 6
TOILETING: TOTAL
CLIMB 3 TO 5 STEPS WITH RAILING: TOTAL
HELP NEEDED FOR BATHING: TOTAL
MOVING FROM LYING ON BACK TO SITTING ON SIDE OF FLAT BED WITH BEDRAILS: TOTAL
TOILETING: TOTAL
MOBILITY SCORE: 6
MOVING TO AND FROM BED TO CHAIR: TOTAL
STANDING UP FROM CHAIR USING ARMS: TOTAL
DRESSING REGULAR UPPER BODY CLOTHING: TOTAL
DAILY ACTIVITIY SCORE: 6
DRESSING REGULAR UPPER BODY CLOTHING: TOTAL
MOVING FROM LYING ON BACK TO SITTING ON SIDE OF FLAT BED WITH BEDRAILS: TOTAL
DRESSING REGULAR LOWER BODY CLOTHING: TOTAL
WALKING IN HOSPITAL ROOM: TOTAL
MOVING TO AND FROM BED TO CHAIR: TOTAL
CLIMB 3 TO 5 STEPS WITH RAILING: TOTAL

## 2024-10-06 ASSESSMENT — ENCOUNTER SYMPTOMS
DIAPHORESIS: 1
ACTIVITY CHANGE: 1
HEMATOLOGIC/LYMPHATIC NEGATIVE: 1
SHORTNESS OF BREATH: 1
ALLERGIC/IMMUNOLOGIC NEGATIVE: 1
WEAKNESS: 1
CARDIOVASCULAR NEGATIVE: 1
APPETITE CHANGE: 1
EYES NEGATIVE: 1
COUGH: 1
NAUSEA: 1
ENDOCRINE NEGATIVE: 1
FATIGUE: 1
PSYCHIATRIC NEGATIVE: 1
MUSCULOSKELETAL NEGATIVE: 1

## 2024-10-06 ASSESSMENT — PAIN SCALES - WONG BAKER
WONGBAKER_NUMERICALRESPONSE: NO HURT
WONGBAKER_NUMERICALRESPONSE: HURTS LITTLE BIT

## 2024-10-06 NOTE — H&P
History Of Present Illness  Vargas Romero is a 29 y.o. male with a past medical history of cerebral palsy, spastic quadriplegia, hypertension, PEG tube, and severe intellectual disability who is s/p baclofen pump placement yesterday presenting to the ER and [facility] for altered mental status.  He is unable to provide his own history as he is nonverbal at baseline but he has been less responsive which is atypical for him as he is usually quite interactive.  He was found to be hypoxic and is now on 6 L per high flow oxygen with adequate saturations.  No mention of any nausea seizures fever chills.  CT scan of the chest showed patchy right greater than left lower lobe consolidation and groundglass opacities compatible with pneumonia.  Debris's within the dependent trachea raises concern for aspiration.  No PE was noted.  There is a right lower quadrant subcutaneous medication bulb in place.  In the ER respiratory therapy was obtaining a large amount of white/yellow thick secretions.     Past Medical History  Past Medical History:   Diagnosis Date    Allergic rhinitis     Dysphagia     Essential hypertension     GERD (gastroesophageal reflux disease)     Intractable seizure disorder (Multi)     Malnutrition (Multi)     PEG (percutaneous endoscopic gastrostomy) status (Multi)     Seasonal allergies     Severe intellectual disability         Surgical History  Past Surgical History:   Procedure Laterality Date    OTHER SURGICAL HISTORY      Orchiectomy    OTHER SURGICAL HISTORY      Placement of baclofen pump    OTHER SURGICAL HISTORY      Tendon release    OTHER SURGICAL HISTORY      PEG         Social History  He reports that he has never smoked. He has never used smokeless tobacco. He reports that he does not drink alcohol and does not use drugs.    Family History  No family history on file.     Allergies  Patient has no known allergies.    Review of Systems   Reason unable to perform ROS: Patient is  "nonverbal with intellectual impairment.   Constitutional:  Positive for activity change, appetite change, diaphoresis and fatigue.   HENT:  Positive for congestion.    Eyes: Negative.    Respiratory:  Positive for cough and shortness of breath.    Cardiovascular: Negative.    Gastrointestinal:  Positive for nausea.   Endocrine: Negative.    Genitourinary: Negative.    Musculoskeletal: Negative.    Skin: Negative.    Allergic/Immunologic: Negative.    Neurological:  Positive for weakness.   Hematological: Negative.    Psychiatric/Behavioral: Negative.     All other systems reviewed and are negative.       Physical Exam  Vitals and nursing note reviewed.   Constitutional:       Appearance: Normal appearance. He is ill-appearing.   HENT:      Head: Normocephalic.      Right Ear: External ear normal.      Left Ear: External ear normal.      Nose: Nose normal.      Mouth/Throat:      Mouth: Mucous membranes are dry.      Pharynx: Oropharynx is clear.   Eyes:      Extraocular Movements: Extraocular movements intact.      Conjunctiva/sclera: Conjunctivae normal.      Pupils: Pupils are equal, round, and reactive to light.   Cardiovascular:      Rate and Rhythm: Normal rate and regular rhythm.   Pulmonary:      Effort: Pulmonary effort is normal.      Breath sounds: Rales present.   Abdominal:      General: Abdomen is flat. Bowel sounds are normal.      Palpations: Abdomen is soft.      Comments: PEG tube   Musculoskeletal:      Right lower leg: Edema present.      Left lower leg: Edema present.   Skin:     General: Skin is warm and dry.   Neurological:      Mental Status: He is alert. Mental status is at baseline.   Psychiatric:         Mood and Affect: Mood normal.         Behavior: Behavior normal.          Last Recorded Vitals  Blood pressure (!) 157/103, pulse 74, temperature 36.3 °C (97.3 °F), resp. rate 18, height 1.6 m (5' 2.99\"), weight (!) 44.5 kg (98 lb), SpO2 98%.    Relevant Results  Meds:  Scheduled " medications  amLODIPine, 7.5 mg, oral, Daily  cloBAZam, 20 mg, oral, Nightly  cyanocobalamin, 500 mcg, oral, Daily  docusate sodium, 50 mg, oral, BID  ergocalciferol, 1,250 mcg, oral, Weekly  folic acid, 1 mg, oral, Daily  heparin (porcine), 5,000 Units, subcutaneous, q8h POPEYE  ipratropium-albuteroL, 3 mL, nebulization, q6h  lacosamide, 50 mg, oral, BID  levETIRAcetam, 1,500 mg, oral, BID  metoprolol tartrate, 25 mg, oral, BID  oxygen, , inhalation, Continuous - Inhalation  pantoprazole, 40 mg, oral, Daily before breakfast   Or  pantoprazole, 40 mg, intravenous, Daily before breakfast  senna, 5 mL, oral, Nightly      Continuous medications  sodium chloride 0.9%, 100 mL/hr      PRN medications  PRN medications: acetaminophen **OR** acetaminophen **OR** acetaminophen, bisacodyl, ondansetron **OR** ondansetron, oxyCODONE   Current Outpatient Medications   Medication Instructions    acetaminophen (TYLENOL) 650 mg, oral, Every 6 hours PRN    amLODIPine (NORVASC) 7.5 mg, oral, Daily    baclofen (Lioresal) 20 mg tablet oral, 3 times daily    bisacodyl (DULCOLAX) 5 mg, oral, Daily PRN, Do not crush, chew, or split.    cannabidiol (Epidiolex) 100 mg/mL solution oral    cloBAZam (ONFI) 20 mg, oral, Nightly    cyanocobalamin (VITAMIN B-12) 500 mcg, oral, Daily    diazePAM (Valtoco) 20 mg/2 spray spray,non-aerosol nasal spray 1 spray, Each Nostril, Once as needed    docusate sodium (COLACE) 50 mg, oral    ergocalciferol (VITAMIN D-2) 1.25 mg, oral, Weekly    folic acid (Folvite) 1 mg tablet oral, Daily    ipratropium-albuteroL (Duo-Neb) 0.5-2.5 mg/3 mL nebulizer solution 3 mL, nebulization, Every 6 hours RT    lacosamide (VIMPAT) 50 mg, 2 times daily    levETIRAcetam (KEPPRA) 1,500 mg, oral, 2 times daily    metoprolol tartrate (LOPRESSOR) 25 mg, oral, 2 times daily    multivitamin tablet 1 tablet, oral, Daily    oxyCODONE (ROXICODONE) 5 mg, oral, Every 6 hours PRN    senna (Senokot) 8.8 mg/5 mL syrup oral, Nightly         Labs:  Results for orders placed or performed during the hospital encounter of 10/05/24 (from the past 24 hour(s))   CBC and Auto Differential   Result Value Ref Range    WBC 5.3 4.4 - 11.3 x10*3/uL    nRBC 0.0 0.0 - 0.0 /100 WBCs    RBC 5.35 4.50 - 5.90 x10*6/uL    Hemoglobin 14.3 13.5 - 17.5 g/dL    Hematocrit 45.4 41.0 - 52.0 %    MCV 85 80 - 100 fL    MCH 26.7 26.0 - 34.0 pg    MCHC 31.5 (L) 32.0 - 36.0 g/dL    RDW 13.5 11.5 - 14.5 %    Platelets 159 150 - 450 x10*3/uL    Neutrophils % 72.1 40.0 - 80.0 %    Immature Granulocytes %, Automated 0.2 0.0 - 0.9 %    Lymphocytes % 14.6 13.0 - 44.0 %    Monocytes % 13.1 2.0 - 10.0 %    Eosinophils % 0.0 0.0 - 6.0 %    Basophils % 0.0 0.0 - 2.0 %    Neutrophils Absolute 3.80 1.20 - 7.70 x10*3/uL    Immature Granulocytes Absolute, Automated 0.01 0.00 - 0.70 x10*3/uL    Lymphocytes Absolute 0.77 (L) 1.20 - 4.80 x10*3/uL    Monocytes Absolute 0.69 0.10 - 1.00 x10*3/uL    Eosinophils Absolute 0.00 0.00 - 0.70 x10*3/uL    Basophils Absolute 0.00 0.00 - 0.10 x10*3/uL   Magnesium   Result Value Ref Range    Magnesium 1.90 1.60 - 2.40 mg/dL   Comprehensive metabolic panel   Result Value Ref Range    Glucose 131 (H) 74 - 99 mg/dL    Sodium 139 136 - 145 mmol/L    Potassium 3.7 3.5 - 5.3 mmol/L    Chloride 101 98 - 107 mmol/L    Bicarbonate 29 21 - 32 mmol/L    Anion Gap 13 10 - 20 mmol/L    Urea Nitrogen 7 6 - 23 mg/dL    Creatinine 0.65 0.50 - 1.30 mg/dL    eGFR >90 >60 mL/min/1.73m*2    Calcium 9.3 8.6 - 10.3 mg/dL    Albumin 4.3 3.4 - 5.0 g/dL    Alkaline Phosphatase 74 33 - 120 U/L    Total Protein 7.6 6.4 - 8.2 g/dL    AST 58 (H) 9 - 39 U/L    Bilirubin, Total 0.4 0.0 - 1.2 mg/dL    ALT 35 10 - 52 U/L   B-Type Natriuretic Peptide   Result Value Ref Range    BNP 24 0 - 99 pg/mL   Blood Gas Venous Full Panel   Result Value Ref Range    POCT pH, Venous 7.37 7.33 - 7.43 pH    POCT pCO2, Venous 58 (H) 41 - 51 mm Hg    POCT pO2, Venous 44 35 - 45 mm Hg    POCT SO2, Venous 64  45 - 75 %    POCT Oxy Hemoglobin, Venous 63.3 45.0 - 75.0 %    POCT Hematocrit Calculated, Venous 44.0 41.0 - 52.0 %    POCT Sodium, Venous 138 136 - 145 mmol/L    POCT Potassium, Venous 3.9 3.5 - 5.3 mmol/L    POCT Chloride, Venous 103 98 - 107 mmol/L    POCT Ionized Calicum, Venous 1.28 1.10 - 1.33 mmol/L    POCT Glucose, Venous 143 (H) 74 - 99 mg/dL    POCT Lactate, Venous 3.6 (H) 0.4 - 2.0 mmol/L    POCT Base Excess, Venous 6.2 (H) -2.0 - 3.0 mmol/L    POCT HCO3 Calculated, Venous 33.5 (H) 22.0 - 26.0 mmol/L    POCT Hemoglobin, Venous 14.8 13.5 - 17.5 g/dL    POCT Anion Gap, Venous 5.0 (L) 10.0 - 25.0 mmol/L    Patient Temperature 37.0 degrees Celsius    FiO2 21 %   Troponin I, High Sensitivity, Initial   Result Value Ref Range    Troponin I, High Sensitivity 4 0 - 20 ng/L   Morphology   Result Value Ref Range    RBC Morphology No significant RBC morphology present    Sars-CoV-2 PCR   Result Value Ref Range    Coronavirus 2019, PCR Not Detected Not Detected   Influenza A, and B PCR   Result Value Ref Range    Flu A Result Not Detected Not Detected    Flu B Result Not Detected Not Detected   Troponin, High Sensitivity, 1 Hour   Result Value Ref Range    Troponin I, High Sensitivity 5 0 - 20 ng/L   Lactate   Result Value Ref Range    Lactate 2.4 (H) 0.4 - 2.0 mmol/L   Lactate   Result Value Ref Range    Lactate 2.8 (H) 0.4 - 2.0 mmol/L      Imaging:  CT angio chest for pulmonary embolism    Result Date: 10/6/2024  Interpreted By:  Timothy Quinn, STUDY: CT ANGIO CHEST FOR PULMONARY EMBOLISM; CT ABDOMEN PELVIS W IV CONTRAST;  10/6/2024 12:37 am   INDICATION: Signs/Symptoms:ams, hypoxia; Signs/Symptoms:recent baclofen pump placement, ams   COMPARISON: None.   ACCESSION NUMBER(S): [id number]; [id number]   ORDERING CLINICIAN: LAYA LA   TECHNIQUE: CT of the chest, abdomen, and pelvis was performed. Contiguous axial images were obtained through the chest, abdomen and pelvis. Coronal and sagittal  reconstructions were performed. Intravenous contrast was administered, 75 mL Omnipaque 350. Chest images were acquired in the pulmonary angiographic phase. MIP/3D reconstructions were performed on a separate workstation and provided for interpretation.   FINDINGS: CHEST:   LOWER NECK AND CHEST WALL:  Within normal limits.   MEDIASTINUM/TAVARES:No lymphadenopathy. Esophagus is unremarkable.   CARDIOVASCULAR:  Cardiac chamber size within normal limits. No pericardial effusion.  Aortic caliber normal. Normal caliber of main pulmonary artery. No pulmonary embolism. Motion artifact limits evaluation of subsegmental pulmonary arteries.   LUNGS, AIRWAYS, AND PLEURA:  Patchy right-greater-than-left lower lobe consolidative and ground-glass opacities. Debris within the dependent trachea.   MUSCULOSKELETAL: No acute osseous abnormality or suspicious osseous lesions.  Intraspinal medication catheter in place.       ABDOMEN:   LIVER: Within normal limits.   BILE DUCTS: Normal caliber.   GALLBLADDER: No calcified stones. No wall thickening.   PANCREAS: Within normal limits.   SPLEEN: Within normal limits.   ADRENALS: Within normal limits.   KIDNEYS, URETERS, and BLADDER: Early contrast excretion limits evaluation for small renal calculi. No hydronephrosis.  Ureters are non-dilated. Urinary bladder within normal limits.   REPRODUCTIVE: No pelvic masses.   VESSELS: Aorta and IVC appear normal.   RETROPERITONEUM and LYMPH NODES: No lymphadenopathy.   BOWEL: Gastrostomy tube in place. Small bowel is non-dilated. Normal appendix. Large bowel is normal.   PERITONEUM: No ascites or free air. No fluid collection.   BODY WALL: Right lower quadrant subcutaneous medication pump in place; metallic streak artifact limits evaluation of adjacent structures. There is soft tissue edema and soft tissue gas surrounding the pump compatible with recent placement. No distinct organized fluid collection is identified.   MUSCULOSKELETAL: Severe  dysplasia/chronic deformity of the left femoroacetabular joint. 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 Thoracolumbar dextrocurvature. No acute osseous abnormality.         1. Patchy right-greater-than-left lower lobe consolidative and ground-glass opacities compatible with pneumonia. Debris within the dependent trachea raising concern for aspiration. 2. No pulmonary embolism. Motion artifact limits evaluation of subsegmental pulmonary arteries. 3. Right lower quadrant subcutaneous medication pump in place; metallic streak artifact limits evaluation of adjacent structures. There is soft tissue edema and soft tissue gas surrounding the pump compatible with recent placement. No distinct organized fluid collection is identified.     Signed by: Timothy Quinn 10/6/2024 1:27 AM Dictation workstation:   [id number]    CT abdomen pelvis w IV contrast    Result Date: 10/6/2024  Interpreted By:  Timothy Quinn, STUDY: CT ANGIO CHEST FOR PULMONARY EMBOLISM; CT ABDOMEN PELVIS W IV CONTRAST;  10/6/2024 12:37 am   INDICATION: Signs/Symptoms:ams, hypoxia; Signs/Symptoms:recent baclofen pump placement, ams   COMPARISON: None.   ACCESSION NUMBER(S): [id number]; [id number]   ORDERING CLINICIAN: LAYA LA   TECHNIQUE: CT of the chest, abdomen, and pelvis was performed. Contiguous axial images were obtained through the chest, abdomen and pelvis. Coronal and sagittal reconstructions were performed. Intravenous contrast was administered, 75 mL Omnipaque 350. Chest images were acquired in the pulmonary angiographic phase. MIP/3D reconstructions were performed on a separate workstation and provided for interpretation.   FINDINGS: CHEST:   LOWER NECK AND CHEST WALL:  Within normal limits.   MEDIASTINUM/TAVARES:No lymphadenopathy. Esophagus is unremarkable.   CARDIOVASCULAR:  Cardiac chamber size within normal limits. No pericardial effusion.  Aortic caliber normal. Normal caliber of main pulmonary artery. No pulmonary embolism.  Motion artifact limits evaluation of subsegmental pulmonary arteries.   LUNGS, AIRWAYS, AND PLEURA:  Patchy right-greater-than-left lower lobe consolidative and ground-glass opacities. Debris within the dependent trachea.   MUSCULOSKELETAL: No acute osseous abnormality or suspicious osseous lesions.  Intraspinal medication catheter in place.       ABDOMEN:   LIVER: Within normal limits.   BILE DUCTS: Normal caliber.   GALLBLADDER: No calcified stones. No wall thickening.   PANCREAS: Within normal limits.   SPLEEN: Within normal limits.   ADRENALS: Within normal limits.   KIDNEYS, URETERS, and BLADDER: Early contrast excretion limits evaluation for small renal calculi. No hydronephrosis.  Ureters are non-dilated. Urinary bladder within normal limits.   REPRODUCTIVE: No pelvic masses.   VESSELS: Aorta and IVC appear normal.   RETROPERITONEUM and LYMPH NODES: No lymphadenopathy.   BOWEL: Gastrostomy tube in place. Small bowel is non-dilated. Normal appendix. Large bowel is normal.   PERITONEUM: No ascites or free air. No fluid collection.   BODY WALL: Right lower quadrant subcutaneous medication pump in place; metallic streak artifact limits evaluation of adjacent structures. There is soft tissue edema and soft tissue gas surrounding the pump compatible with recent placement. No distinct organized fluid collection is identified.   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 Thoracolumbar dextrocurvature. No acute osseous abnormality.         1. Patchy right-greater-than-left lower lobe consolidative and ground-glass opacities compatible with pneumonia. Debris within the dependent trachea raising concern for aspiration. 2. No pulmonary embolism. Motion artifact limits evaluation of subsegmental pulmonary arteries. 3. Right lower quadrant subcutaneous medication pump in place; metallic streak artifact limits evaluation of adjacent structures. There  is soft tissue edema and soft tissue gas surrounding the pump compatible with recent placement. No distinct organized fluid collection is identified.     Signed by: Timothy Quinn 10/6/2024 1:27 AM Dictation workstation:   [id number]    CT head wo IV contrast    Result Date: 10/6/2024  Interpreted By:  Timothy Quinn, STUDY: CT HEAD WO IV CONTRAST;  10/6/2024 12:37 am   INDICATION: Signs/Symptoms:ams.   COMPARISON: None.   ACCESSION NUMBER(S): [id number]   ORDERING CLINICIAN: LAYA LA   TECHNIQUE: Axial noncontrast CT images of the head. Sagittal and coronal reformats were provided.   FINDINGS: BRAIN: No acute intracranial hemorrhage. No mass effect or midline shift. Gray-white matter interfaces are preserved. Patchy hypoattenuation of the white matter which is favored to be artifactual.   VENTRICLES and EXTRA-AXIAL SPACES: Prominent ventricles and extra-axial CSF spaces, reflecting parenchymal volume loss.   EXTRACRANIAL SOFT TISSUES:  Within normal limits.   PARANASAL SINUSES/MASTOIDS: The visualized paranasal sinuses and mastoid air cells are aerated.   BONES AND ORBITS: No displaced skull fracture. Orbits are within normal limits.   OTHER FINDINGS: None.       1. No acute intracranial hemorrhage or mass effect. 2. Prominent ventricles and extra-axial CSF spaces, reflecting parenchymal volume loss. 3. Patchy hypoattenuation of the white matter which is favored to be artifactual.   Signed by: Timothy Quinn 10/6/2024 1:13 AM Dictation workstation:   [id number]    XR chest 1 view    Result Date: 10/6/2024  Interpreted By:  Felix Devlin, STUDY: XR CHEST 1 VIEW;  10/5/2024 11:25 pm   INDICATION: Signs/Symptoms:hypoxia.   COMPARISON: None.   ACCESSION NUMBER(S): [id number]   ORDERING CLINICIAN: LAYA LA   FINDINGS:     CARDIOMEDIASTINAL SILHOUETTE: Rightward mediastinal shift.   LUNGS: The right lung is asymmetrically lucent compared to the left. No definite pleural effusion  or pneumothorax.   ABDOMEN: Gas in the left upper quadrant, possibly in the stomach, but extraluminal air is not excluded.   BONES: No acute osseous abnormality.       The right lung is asymmetrically lucent compared to the left. This finding is nonspecific and may represent congenital lobar emphysema, trapped air secondary to endobronchial obstruction, or left-sided airspace disease. Recommend chest CT to better evaluate.   Prominent gas is present in the left upper quadrant, possibly in the colon and stomach but extraluminal air is not excluded. This can also be evaluated on the above recommended chest CT.   MACRO: None   Signed by: Felix Devlin 10/6/2024 12:13 AM Dictation workstation:   [id number]      Assessment/Plan   Acute respiratory failure with hypoxia/has a chronically elevated pCO2.  New oxygen requirement  Plan:  Supplemental oxygen as needed    Bilateral pneumonia with suspected aspiration  Plan:  Start tube feedings at 40 mL an hour dietary consult  Zosyn  Zithromax  MRSA screen vancomycin a positive    Metabolic encephalopathy secondary to pneumonia and hypoxia    Seizure disorder  Plan:  Keppra 1500 mg twice daily  Vimpat 50 mg twice daily  Onfi 20 mg at night    Spastic quadriplegia  Plan:  Patient has a baclofen pump in place that was replaced yesterday    GERD  Protonix    Hypertension  Amlodipine 7.5 mg via PEG daily    DVT prophylaxis  Heparin 5000 units subcutaneously every 8 hours  SCDs    I spent 60 minutes in the professional and overall care of this patient.      Marcos Mercado DO

## 2024-10-06 NOTE — SIGNIFICANT EVENT
10/6 rt called and assessed pt for more sxg,pt didn't need,family said he's better now that he was repositioned.pt not laboring,w spo2 98% on 8 liters hfnc/nurse aware

## 2024-10-06 NOTE — NURSING NOTE
1600Pt BP of 181/104, HR sustaining in the 120s, temperature of 99.8F. Pt has increased amount of drooling noted, this RN attempted to oral suction to help. RT came to bedside and did a deeper catheter suctioning. PRN Tylenol given per MAR. This RN spoke with Dr. Coronel to notify him. Checking lactate. No other new orders.     1630 HR is now sustaining in the 100s. Rhythm remains in sinus tach.     1700 BP, HR, and temp is still elevated. This RN reached out to Dr. Coronel again.    1800 Dr. White came assess the pt at bedside. EKG completed and shown to be sinus tach. Pt O2 at 95% on 8L of high flow. Stat chest xray ordered. Repositioning and suctioning of the pt seems to help a bit.     1830 Notified Dr. White of chest xray completed. Spoke with both Dr. Coronel and Dr. White. New orders placed.

## 2024-10-06 NOTE — ED NOTES
Family member at bedside, states this is not patient'snormal  baseline as he is normally alert and will look at you. Pt is currently not opening his eyes. Pt is not normally on oxygen at home per family. Pt is given breathing treatments but not on continuous oxygen as he is requiring at this time. MD called to bedside.     Kristi Ruelas RN  10/05/24 0751

## 2024-10-06 NOTE — SIGNIFICANT EVENT
Pt desat to 90-91% on 5LNC w/ rhonchi. Increased O2 to 7L and NT sxn pt without any complications. Obtained large amount of white/yellow thick secretions. Pt satting 95% at this time with 7L.     00:50 pt desat again incresed O2 to 15L HFNC, and NT sxn pt. Obtained large amount of white thick secretions.    02:39: Decreased O2 to 13L HFNC. No rhonchi noted at this time.

## 2024-10-06 NOTE — ED PROVIDER NOTES
Emergency Department Provider Note             History of Present Illness   CC: Post-op Problem and Respiratory Distress    History provided by: EMS and Friend  Limitations to History: Altered Mental Status    HPI:  Vargas Romero is a 29 y.o. male with history including cerebral palsy, spastic quadriplegia, nonverbal at baseline, seizures, hypertension, GERD, dysphagia with PEG tube dependence, s/p baclofen pump replacement yesterday presenting to the emergency department for altered mental status. He is unable to provide history himself but his close friend states he's been less responsive all day which is atypical for him, as he's usually interactive and alert.  He was found to be hypoxic and is now on 6 L oxygen.  His friend states he is not normally on oxygen at home.  States he noticed that he had increased work of breathing and a cough.  Denies vomiting, seizure, fever, recent trauma. He notes he received all of his meds today.     ---  Past Medical History:   Diagnosis Date    Allergic rhinitis     Dysphagia     Essential hypertension     GERD (gastroesophageal reflux disease)     Intractable seizure disorder (Multi)     Malnutrition (Multi)     PEG (percutaneous endoscopic gastrostomy) status (Multi)     Seasonal allergies     Severe intellectual disability      Past Surgical History:   Procedure Laterality Date    OTHER SURGICAL HISTORY      Orchiectomy    OTHER SURGICAL HISTORY      Placement of baclofen pump    OTHER SURGICAL HISTORY      Tendon release    OTHER SURGICAL HISTORY      PEG       No Known Allergies    Physical Exam   Triage vitals:  T 36.3 °C (97.3 °F)  HR 92  BP (!) 153/100  RR 20  O2 98 % Supplemental oxygen    General: chronically ill-appearing, lethargic, no distress  Head: normocephalic, atraumatic  Eyes: pupils equal, extraocular movements grossly intact, no conjunctival injection or scleral icterus  ENT: nares patent, slightly dry mucous membranes  Neck: supple, trachea midline,  no masses  CV: regular rate and rhythm, well-perfused  Resp: coarse breath sounds, mild tachypnea without accessory muscle use.   GI: soft, non-distended, non-tender, no rebound or guarding, baclofen pump and peg tube in place, no surrounding erythema/fluctuance/purulence  Extremities: muscle wasting, well-perfused   Neuro: lethargic, nonverbal, face is symmetric, contracted extremities    ED Course & Medical Decision Making   29 y.o. male with history including cerebral palsy, spastic quadriplegia, nonverbal at baseline, seizures, hypertension, GERD, dysphagia with PEG tube dependence, s/p baclofen pump replacement yesterday presenting to the emergency department for altered mental status today.  No appreciable new focal deficits though he does have spastic quadriplegia at baseline.  He is requiring 6 L nasal cannula to keep his sats above 94%.  He is hemodynamically stable.  Mildly tachypneic without accessory muscle use.  He has coarse breath sounds bilaterally.  Given his limited history and overall presentation, cultures were obtained. He was maintained on continuous monitoring. Given concern for possible aspiration, he was covered with empiric antibiotics. See ED course for further information.     I suspect his altered mental could be related to having a functioning baclofen pump but he's ventilating appropriately so emergent adjustment not indicated at this time. Could also be secondary to infectious etiology.     External Records Reviewed: SNF paperwork, recent provider notes including op note and [facility]    Social Determinants Limiting Care: Developmental delay, disability    EKG: per my interpretation - normal sinus rhythm, rate 97, normal axis and intervals, no ST deviation or significant T wave abnormalities    Results: Independently reviewed and interpreted by me. Please see ED course and MDM for my full interpretation.     Chronic Medical Conditions Significantly Affecting Care: As documented  above in [facility]    Patient was discussed with the following consultants/services:  hospitalist      Care Considerations: As documented above in [facility]    ED Course:  ED Course as of 10/06/24 1549   Sun Oct 06, 2024   0002 Viral swabs are negative.  Labs are notable for lactate of 3.6, otherwise unremarkable.  He has no acidosis.  White count is normal. [LM]   0144 Repeat lactate  after fluids is 2.4.  His reperfusion exam remains stable.  I reviewed his CT scans which show evidence of pneumonia, suspected aspiration.  His baclofen pump appears to be in place.  No obvious associated infection is noted. No acute intracranial process noted.  [LM]   0145 He is now on high flow nasal cannula for further oxygenation support.  [LM]      ED Course User Index  [LM] Suri Tobar MD         Diagnoses as of 10/06/24 1549   Aspiration pneumonia due to anesthesia during labor and delivery, unspecified laterality, unspecified part of lung ([facility])   Acute hypoxic respiratory failure (Multi)   Altered mental status, unspecified altered mental status type     Disposition   Admission     Procedures   Critical Care    Performed by: Suri Tobar MD  Authorized by: Suri Tobar MD    Critical care provider statement:     Critical care time (minutes):  20    Critical care time was exclusive of:  Teaching time and separately billable procedures and treating other patients    Critical care was necessary to treat or prevent imminent or life-threatening deterioration of the following conditions:  Respiratory failure    Critical care was time spent personally by me on the following activities:  Evaluation of patient's response to treatment, pulse oximetry, ordering and review of radiographic studies, re-evaluation of patient's condition and review of old charts    Care discussed with: admitting provider        MD Suri Nelson MD  10/06/24 1540

## 2024-10-06 NOTE — ED TRIAGE NOTES
Ptto ED by EMS from home with the complaint of respiratory distress. Pt was reportedly here earlier this morning for surgery (unknown surgery per EMS). Pt was found to be 84% on arrival. Per EMS, there was issues at home with their oxygen tank and patient not of been on oxygen for a period of time. PT has cerebral palsy and is non verbal at baseline

## 2024-10-06 NOTE — SIGNIFICANT EVENT
RT informed RN of pt's increased heart rate.  RN states they are aware and pt's heart rate has been elevated for some time now.  RT remained at bedside during aerosl tx. To monitor heart rate.

## 2024-10-07 LAB
ALBUMIN SERPL BCP-MCNC: 3.5 G/DL (ref 3.4–5)
ANION GAP SERPL CALC-SCNC: 13 MMOL/L (ref 10–20)
ATRIAL RATE: 119 BPM
ATRIAL RATE: 97 BPM
BASOPHILS # BLD MANUAL: 0 X10*3/UL (ref 0–0.1)
BASOPHILS NFR BLD MANUAL: 0 %
BUN SERPL-MCNC: 7 MG/DL (ref 6–23)
CALCIUM SERPL-MCNC: 8.2 MG/DL (ref 8.6–10.3)
CHLORIDE SERPL-SCNC: 105 MMOL/L (ref 98–107)
CO2 SERPL-SCNC: 24 MMOL/L (ref 21–32)
CREAT SERPL-MCNC: 0.58 MG/DL (ref 0.5–1.3)
EGFRCR SERPLBLD CKD-EPI 2021: >90 ML/MIN/1.73M*2
EOSINOPHIL # BLD MANUAL: 0 X10*3/UL (ref 0–0.7)
EOSINOPHIL NFR BLD MANUAL: 0 %
ERYTHROCYTE [DISTWIDTH] IN BLOOD BY AUTOMATED COUNT: 13.8 % (ref 11.5–14.5)
GLUCOSE SERPL-MCNC: 162 MG/DL (ref 74–99)
HCT VFR BLD AUTO: 42.2 % (ref 41–52)
HGB BLD-MCNC: 13.6 G/DL (ref 13.5–17.5)
HOLD SPECIMEN: NORMAL
IMM GRANULOCYTES # BLD AUTO: 0.01 X10*3/UL (ref 0–0.7)
IMM GRANULOCYTES NFR BLD AUTO: 0.1 % (ref 0–0.9)
LEGIONELLA AG UR QL: NEGATIVE
LYMPHOCYTES # BLD MANUAL: 1.28 X10*3/UL (ref 1.2–4.8)
LYMPHOCYTES NFR BLD MANUAL: 15 %
MCH RBC QN AUTO: 26.6 PG (ref 26–34)
MCHC RBC AUTO-ENTMCNC: 32.2 G/DL (ref 32–36)
MCV RBC AUTO: 82 FL (ref 80–100)
METAMYELOCYTES # BLD MANUAL: 0.17 X10*3/UL
METAMYELOCYTES NFR BLD MANUAL: 2 %
MONOCYTES # BLD MANUAL: 0.17 X10*3/UL (ref 0.1–1)
MONOCYTES NFR BLD MANUAL: 2 %
NEUTROPHILS # BLD MANUAL: 6.89 X10*3/UL (ref 1.2–7.7)
NEUTS BAND # BLD MANUAL: 0.77 X10*3/UL (ref 0–0.7)
NEUTS BAND NFR BLD MANUAL: 9 %
NEUTS SEG # BLD MANUAL: 6.12 X10*3/UL (ref 1.2–7)
NEUTS SEG NFR BLD MANUAL: 72 %
NRBC BLD-RTO: 0 /100 WBCS (ref 0–0)
P AXIS: 28 DEGREES
P AXIS: 59 DEGREES
P OFFSET: 187 MS
P OFFSET: 189 MS
P ONSET: 133 MS
P ONSET: 155 MS
PHOSPHATE SERPL-MCNC: 1.6 MG/DL (ref 2.5–4.9)
PLATELET # BLD AUTO: 159 X10*3/UL (ref 150–450)
POTASSIUM SERPL-SCNC: 5 MMOL/L (ref 3.5–5.3)
PR INTERVAL: 138 MS
PR INTERVAL: 168 MS
PROCALCITONIN SERPL-MCNC: 4.69 NG/ML
Q ONSET: 217 MS
Q ONSET: 224 MS
QRS COUNT: 16 BEATS
QRS COUNT: 19 BEATS
QRS DURATION: 78 MS
QRS DURATION: 88 MS
QT INTERVAL: 286 MS
QT INTERVAL: 332 MS
QTC CALCULATION(BAZETT): 402 MS
QTC CALCULATION(BAZETT): 421 MS
QTC FREDERICIA: 359 MS
QTC FREDERICIA: 389 MS
R AXIS: 31 DEGREES
R AXIS: 38 DEGREES
RBC # BLD AUTO: 5.12 X10*6/UL (ref 4.5–5.9)
RBC MORPH BLD: ABNORMAL
S PNEUM AG UR QL: NEGATIVE
SODIUM SERPL-SCNC: 137 MMOL/L (ref 136–145)
T AXIS: 58 DEGREES
T AXIS: 64 DEGREES
T OFFSET: 360 MS
T OFFSET: 390 MS
TOTAL CELLS COUNTED BLD: 100
VENTRICULAR RATE: 119 BPM
VENTRICULAR RATE: 97 BPM
WBC # BLD AUTO: 8.5 X10*3/UL (ref 4.4–11.3)

## 2024-10-07 PROCEDURE — 80069 RENAL FUNCTION PANEL: CPT | Performed by: HOSPITALIST

## 2024-10-07 PROCEDURE — 99232 SBSQ HOSP IP/OBS MODERATE 35: CPT | Performed by: CLINICAL NURSE SPECIALIST

## 2024-10-07 PROCEDURE — 2500000004 HC RX 250 GENERAL PHARMACY W/ HCPCS (ALT 636 FOR OP/ED): Performed by: STUDENT IN AN ORGANIZED HEALTH CARE EDUCATION/TRAINING PROGRAM

## 2024-10-07 PROCEDURE — 94640 AIRWAY INHALATION TREATMENT: CPT

## 2024-10-07 PROCEDURE — 85007 BL SMEAR W/DIFF WBC COUNT: CPT | Performed by: HOSPITALIST

## 2024-10-07 PROCEDURE — 36415 COLL VENOUS BLD VENIPUNCTURE: CPT | Performed by: HOSPITALIST

## 2024-10-07 PROCEDURE — 2500000004 HC RX 250 GENERAL PHARMACY W/ HCPCS (ALT 636 FOR OP/ED): Performed by: INTERNAL MEDICINE

## 2024-10-07 PROCEDURE — 85027 COMPLETE CBC AUTOMATED: CPT | Performed by: HOSPITALIST

## 2024-10-07 PROCEDURE — 2500000002 HC RX 250 W HCPCS SELF ADMINISTERED DRUGS (ALT 637 FOR MEDICARE OP, ALT 636 FOR OP/ED): Performed by: INTERNAL MEDICINE

## 2024-10-07 PROCEDURE — 99232 SBSQ HOSP IP/OBS MODERATE 35: CPT | Performed by: HOSPITALIST

## 2024-10-07 PROCEDURE — 2500000001 HC RX 250 WO HCPCS SELF ADMINISTERED DRUGS (ALT 637 FOR MEDICARE OP): Performed by: INTERNAL MEDICINE

## 2024-10-07 PROCEDURE — 1200000002 HC GENERAL ROOM WITH TELEMETRY DAILY

## 2024-10-07 PROCEDURE — 2500000005 HC RX 250 GENERAL PHARMACY W/O HCPCS: Performed by: HOSPITALIST

## 2024-10-07 RX ORDER — AZITHROMYCIN 500 MG/1
500 TABLET, FILM COATED ORAL EVERY 24 HOURS
Status: DISCONTINUED | OUTPATIENT
Start: 2024-10-07 | End: 2024-10-07

## 2024-10-07 RX ADMIN — LEVETIRACETAM 1500 MG: 500 TABLET, FILM COATED ORAL at 20:57

## 2024-10-07 RX ADMIN — IPRATROPIUM BROMIDE AND ALBUTEROL SULFATE 3 ML: 2.5; .5 SOLUTION RESPIRATORY (INHALATION) at 08:04

## 2024-10-07 RX ADMIN — PIPERACILLIN SODIUM AND TAZOBACTAM SODIUM 3.38 G: 3; .375 INJECTION, SOLUTION INTRAVENOUS at 17:18

## 2024-10-07 RX ADMIN — METOPROLOL TARTRATE 25 MG: 25 TABLET, FILM COATED ORAL at 08:28

## 2024-10-07 RX ADMIN — HEPARIN SODIUM 5000 UNITS: 5000 INJECTION INTRAVENOUS; SUBCUTANEOUS at 20:57

## 2024-10-07 RX ADMIN — PANTOPRAZOLE SODIUM 40 MG: 40 TABLET, DELAYED RELEASE ORAL at 06:00

## 2024-10-07 RX ADMIN — GLYCOPYRROLATE 0.2 MG: 0.2 INJECTION, SOLUTION INTRAMUSCULAR; INTRAVITREAL at 20:57

## 2024-10-07 RX ADMIN — LACOSAMIDE 50 MG: 50 TABLET, FILM COATED ORAL at 20:56

## 2024-10-07 RX ADMIN — Medication 3 L/MIN: at 12:54

## 2024-10-07 RX ADMIN — GLYCOPYRROLATE 0.2 MG: 0.2 INJECTION, SOLUTION INTRAMUSCULAR; INTRAVITREAL at 16:10

## 2024-10-07 RX ADMIN — PIPERACILLIN SODIUM AND TAZOBACTAM SODIUM 3.38 G: 3; .375 INJECTION, SOLUTION INTRAVENOUS at 12:21

## 2024-10-07 RX ADMIN — IPRATROPIUM BROMIDE AND ALBUTEROL SULFATE 3 ML: 2.5; .5 SOLUTION RESPIRATORY (INHALATION) at 19:18

## 2024-10-07 RX ADMIN — ACETAMINOPHEN 325MG 650 MG: 325 TABLET ORAL at 20:56

## 2024-10-07 RX ADMIN — CLOBAZAM 10 MG: 10 TABLET ORAL at 20:57

## 2024-10-07 RX ADMIN — METOPROLOL TARTRATE 25 MG: 25 TABLET, FILM COATED ORAL at 20:57

## 2024-10-07 RX ADMIN — CYANOCOBALAMIN TAB 500 MCG 500 MCG: 500 TAB at 08:29

## 2024-10-07 RX ADMIN — LEVETIRACETAM 1500 MG: 500 TABLET, FILM COATED ORAL at 08:29

## 2024-10-07 RX ADMIN — HEPARIN SODIUM 5000 UNITS: 5000 INJECTION INTRAVENOUS; SUBCUTANEOUS at 06:00

## 2024-10-07 RX ADMIN — IPRATROPIUM BROMIDE AND ALBUTEROL SULFATE 3 ML: 2.5; .5 SOLUTION RESPIRATORY (INHALATION) at 03:51

## 2024-10-07 RX ADMIN — PIPERACILLIN SODIUM AND TAZOBACTAM SODIUM 3.38 G: 3; .375 INJECTION, SOLUTION INTRAVENOUS at 06:00

## 2024-10-07 RX ADMIN — DOCUSATE SODIUM 50 MG: 50 LIQUID ORAL at 08:28

## 2024-10-07 RX ADMIN — ACETAMINOPHEN 325MG 650 MG: 325 TABLET ORAL at 15:27

## 2024-10-07 RX ADMIN — PIPERACILLIN SODIUM AND TAZOBACTAM SODIUM 3.38 G: 3; .375 INJECTION, SOLUTION INTRAVENOUS at 23:20

## 2024-10-07 RX ADMIN — DOCUSATE SODIUM 50 MG: 50 LIQUID ORAL at 20:57

## 2024-10-07 RX ADMIN — HEPARIN SODIUM 5000 UNITS: 5000 INJECTION INTRAVENOUS; SUBCUTANEOUS at 14:35

## 2024-10-07 RX ADMIN — AMLODIPINE BESYLATE 7.5 MG: 5 TABLET ORAL at 08:28

## 2024-10-07 RX ADMIN — LACOSAMIDE 50 MG: 50 TABLET, FILM COATED ORAL at 08:28

## 2024-10-07 RX ADMIN — FOLIC ACID 1 MG: 1 TABLET ORAL at 08:29

## 2024-10-07 RX ADMIN — Medication 3 L/MIN: at 20:00

## 2024-10-07 RX ADMIN — IPRATROPIUM BROMIDE AND ALBUTEROL SULFATE 3 ML: 2.5; .5 SOLUTION RESPIRATORY (INHALATION) at 13:29

## 2024-10-07 SDOH — ECONOMIC STABILITY: TRANSPORTATION INSECURITY
IN THE PAST 12 MONTHS, HAS THE LACK OF TRANSPORTATION KEPT YOU FROM MEDICAL APPOINTMENTS OR FROM GETTING MEDICATIONS?: NO

## 2024-10-07 SDOH — HEALTH STABILITY: MENTAL HEALTH: HOW OFTEN DO YOU HAVE A DRINK CONTAINING ALCOHOL?: NEVER

## 2024-10-07 SDOH — ECONOMIC STABILITY: HOUSING INSECURITY: IN THE LAST 12 MONTHS, WAS THERE A TIME WHEN YOU WERE NOT ABLE TO PAY THE MORTGAGE OR RENT ON TIME?: NO

## 2024-10-07 SDOH — ECONOMIC STABILITY: FOOD INSECURITY: HOW HARD IS IT FOR YOU TO PAY FOR THE VERY BASICS LIKE FOOD, HOUSING, MEDICAL CARE, AND HEATING?: NOT VERY HARD

## 2024-10-07 SDOH — ECONOMIC STABILITY: INCOME INSECURITY: HOW HARD IS IT FOR YOU TO PAY FOR THE VERY BASICS LIKE FOOD, HOUSING, MEDICAL CARE, AND HEATING?: NOT VERY HARD

## 2024-10-07 SDOH — ECONOMIC STABILITY: TRANSPORTATION INSECURITY: IN THE PAST 12 MONTHS, HAS LACK OF TRANSPORTATION KEPT YOU FROM MEDICAL APPOINTMENTS OR FROM GETTING MEDICATIONS?: NO

## 2024-10-07 SDOH — ECONOMIC STABILITY: HOUSING INSECURITY: AT ANY TIME IN THE PAST 12 MONTHS, WERE YOU HOMELESS OR LIVING IN A SHELTER (INCLUDING NOW)?: NO

## 2024-10-07 SDOH — ECONOMIC STABILITY: INCOME INSECURITY: IN THE LAST 12 MONTHS, WAS THERE A TIME WHEN YOU WERE NOT ABLE TO PAY THE MORTGAGE OR RENT ON TIME?: NO

## 2024-10-07 SDOH — HEALTH STABILITY: MENTAL HEALTH: HOW MANY STANDARD DRINKS CONTAINING ALCOHOL DO YOU HAVE ON A TYPICAL DAY?: PATIENT DOES NOT DRINK

## 2024-10-07 SDOH — HEALTH STABILITY: MENTAL HEALTH: HOW MANY DRINKS CONTAINING ALCOHOL DO YOU HAVE ON A TYPICAL DAY WHEN YOU ARE DRINKING?: PATIENT DOES NOT DRINK

## 2024-10-07 ASSESSMENT — ACTIVITIES OF DAILY LIVING (ADL): LACK_OF_TRANSPORTATION: NO

## 2024-10-07 ASSESSMENT — COGNITIVE AND FUNCTIONAL STATUS - GENERAL
WALKING IN HOSPITAL ROOM: TOTAL
MOVING FROM LYING ON BACK TO SITTING ON SIDE OF FLAT BED WITH BEDRAILS: TOTAL
DRESSING REGULAR UPPER BODY CLOTHING: TOTAL
MOBILITY SCORE: 6
HELP NEEDED FOR BATHING: TOTAL
DAILY ACTIVITIY SCORE: 6
STANDING UP FROM CHAIR USING ARMS: TOTAL
TURNING FROM BACK TO SIDE WHILE IN FLAT BAD: TOTAL
CLIMB 3 TO 5 STEPS WITH RAILING: TOTAL
MOVING TO AND FROM BED TO CHAIR: TOTAL
STANDING UP FROM CHAIR USING ARMS: TOTAL
DRESSING REGULAR UPPER BODY CLOTHING: TOTAL
EATING MEALS: TOTAL
TURNING FROM BACK TO SIDE WHILE IN FLAT BAD: TOTAL
TOILETING: TOTAL
PERSONAL GROOMING: TOTAL
TOILETING: TOTAL
HELP NEEDED FOR BATHING: TOTAL
EATING MEALS: TOTAL
MOVING FROM LYING ON BACK TO SITTING ON SIDE OF FLAT BED WITH BEDRAILS: TOTAL
DRESSING REGULAR LOWER BODY CLOTHING: TOTAL
DRESSING REGULAR LOWER BODY CLOTHING: TOTAL
MOBILITY SCORE: 6
PERSONAL GROOMING: TOTAL
CLIMB 3 TO 5 STEPS WITH RAILING: TOTAL
MOVING TO AND FROM BED TO CHAIR: TOTAL
DAILY ACTIVITIY SCORE: 6
WALKING IN HOSPITAL ROOM: TOTAL

## 2024-10-07 ASSESSMENT — PAIN SCALES - WONG BAKER: WONGBAKER_NUMERICALRESPONSE: NO HURT

## 2024-10-07 ASSESSMENT — PAIN SCALES - GENERAL
PAINLEVEL_OUTOF10: 0 - NO PAIN
PAINLEVEL_OUTOF10: 0 - NO PAIN

## 2024-10-07 NOTE — CARE PLAN
The patient's goals for the shift include      The clinical goals for the shift include maintain safety    Over the shift, the patient is making progress toward the following goals.       Problem: Fall/Injury  Goal: Not fall by end of shift  Outcome: Progressing     Problem: Fall/Injury  Goal: Be free from injury by end of the shift  Outcome: Progressing

## 2024-10-07 NOTE — CARE PLAN
The patient's goals for the shift include      The clinical goals for the shift include maintain safety      Problem: Fall/Injury  Goal: Not fall by end of shift  Outcome: Progressing  Goal: Be free from injury by end of the shift  Outcome: Progressing     Problem: Skin  Goal: Prevent/manage excess moisture  Outcome: Progressing  Flowsheets (Taken 10/6/2024 2270)  Prevent/manage excess moisture: Moisturize dry skin  Goal: Prevent/minimize sheer/friction injuries  Outcome: Progressing     Problem: Pain  Goal: Turns in bed with improved pain control throughout the shift  Outcome: Progressing

## 2024-10-07 NOTE — PROGRESS NOTES
10/07/24 0832   Discharge Planning   Living Arrangements Other (Comment)   Support Systems Family members   Type of Residence Skilled nursing facility   Do you have animals or pets at home? No   Who is requesting discharge planning? Patient   Home or Post Acute Services Post acute facilities (Rehab/SNF/etc)   Type of Post Acute Facility Services Skilled nursing   Expected Discharge Disposition SNF   Financial Resource Strain   How hard is it for you to pay for the very basics like food, housing, medical care, and heating? Not very   Housing Stability   In the last 12 months, was there a time when you were not able to pay the mortgage or rent on time? N   At any time in the past 12 months, were you homeless or living in a shelter (including now)? N   Transportation Needs   In the past 12 months, has lack of transportation kept you from medical appointments or from getting medications? no   In the past 12 months, has lack of transportation kept you from meetings, work, or from getting things needed for daily living? No   Patient Choice   Provider Choice list and CMS website (https://medicare.gov/care-compare#search) for post-acute Quality and Resource Measure Data were provided and reviewed with: Patient     Per notes, patient has Cerebral palsy, and is non-verbal, I did call the patient contact listed person on file Leyla phone [phone] I received a message that the mail box is full. Per notes patient is from a SNF however the notes do not say which facility he is from, I am not finding and SNF paperwork in the patient's chart, currently the patient is on IV zithor and zosyn adm to rule out Aspiration Pna, blood cx pending, patient currently also has a ped tube. I will continue to monitor for discharge planning.

## 2024-10-07 NOTE — CONSULTS
Nutrition Assessment Note    Reason for Assessment  Reason for Assessment: Provider consult order    Chart reviewed and pt visited.  Pt non verbal.  TF running at goal rate 40ml/hr.  Current TF order covers pts nutritional needs    Past Medical History:   Diagnosis Date    Allergic rhinitis     Dysphagia     Essential hypertension     GERD (gastroesophageal reflux disease)     Intractable seizure disorder (Multi)     Malnutrition (Multi)     PEG (percutaneous endoscopic gastrostomy) status (Multi)     Seasonal allergies     Severe intellectual disability      Results for orders placed or performed during the hospital encounter of 10/05/24 (from the past 24 hour(s))   Lactate   Result Value Ref Range    Lactate 2.4 (H) 0.4 - 2.0 mmol/L   Procalcitonin   Result Value Ref Range    Procalcitonin 4.69 (H) <=0.07 ng/mL   Lactate   Result Value Ref Range    Lactate 1.9 0.4 - 2.0 mmol/L   Renal Function Panel   Result Value Ref Range    Glucose 162 (H) 74 - 99 mg/dL    Sodium 137 136 - 145 mmol/L    Potassium 5.0 3.5 - 5.3 mmol/L    Chloride 105 98 - 107 mmol/L    Bicarbonate 24 21 - 32 mmol/L    Anion Gap 13 10 - 20 mmol/L    Urea Nitrogen 7 6 - 23 mg/dL    Creatinine 0.58 0.50 - 1.30 mg/dL    eGFR >90 >60 mL/min/1.73m*2    Calcium 8.2 (L) 8.6 - 10.3 mg/dL    Phosphorus 1.6 (L) 2.5 - 4.9 mg/dL    Albumin 3.5 3.4 - 5.0 g/dL   SST TOP   Result Value Ref Range    Extra Tube Hold for add-ons.    CBC and Auto Differential   Result Value Ref Range    WBC 8.5 4.4 - 11.3 x10*3/uL    nRBC 0.0 0.0 - 0.0 /100 WBCs    RBC 5.12 4.50 - 5.90 x10*6/uL    Hemoglobin 13.6 13.5 - 17.5 g/dL    Hematocrit 42.2 41.0 - 52.0 %    MCV 82 80 - 100 fL    MCH 26.6 26.0 - 34.0 pg    MCHC 32.2 32.0 - 36.0 g/dL    RDW 13.8 11.5 - 14.5 %    Platelets 159 150 - 450 x10*3/uL    Immature Granulocytes %, Automated 0.1 0.0 - 0.9 %    Immature Granulocytes Absolute, Automated 0.01 0.00 - 0.70 x10*3/uL   Manual Differential   Result Value Ref Range     Neutrophils %, Manual 72.0 40.0 - 80.0 %    Bands %, Manual 9.0 0.0 - 5.0 %    Lymphocytes %, Manual 15.0 13.0 - 44.0 %    Monocytes %, Manual 2.0 2.0 - 10.0 %    Eosinophils %, Manual 0.0 0.0 - 6.0 %    Basophils %, Manual 0.0 0.0 - 2.0 %    Metamyelocytes %, Manual 2.0 0.0 - 0.0 %    Seg Neutrophils Absolute, Manual 6.12 1.20 - 7.00 x10*3/uL    Bands Absolute, Manual 0.77 (H) 0.00 - 0.70 x10*3/uL    Lymphocytes Absolute, Manual 1.28 1.20 - 4.80 x10*3/uL    Monocytes Absolute, Manual 0.17 0.10 - 1.00 x10*3/uL    Eosinophils Absolute, Manual 0.00 0.00 - 0.70 x10*3/uL    Basophils Absolute, Manual 0.00 0.00 - 0.10 x10*3/uL    Metamyelocytes Absolute, Manual 0.17 0.00 - 0.00 x10*3/uL    Total Cells Counted 100     Neutrophils Absolute, Manual 6.89 1.20 - 7.70 x10*3/uL    RBC Morphology No significant RBC morphology present      Scheduled medications  amLODIPine, 7.5 mg, oral, Daily  azithromycin, 500 mg, oral, q24h  cannabidiol, 5 mg/kg, oral, BID  cloBAZam, 10 mg, oral, Nightly  cyanocobalamin, 500 mcg, oral, Daily  docusate sodium, 50 mg, oral, BID  ergocalciferol, 1,250 mcg, oral, Weekly  folic acid, 1 mg, oral, Daily  heparin (porcine), 5,000 Units, subcutaneous, q8h POPEYE  ipratropium-albuteroL, 3 mL, nebulization, q6h  lacosamide, 50 mg, oral, BID  levETIRAcetam, 1,500 mg, oral, BID  metoprolol tartrate, 25 mg, oral, BID  oxygen, , inhalation, Continuous - Inhalation  pantoprazole, 40 mg, oral, Daily before breakfast   Or  pantoprazole, 40 mg, intravenous, Daily before breakfast  piperacillin-tazobactam, 3.375 g, intravenous, q6h  senna, 5 mL, oral, Nightly      Continuous medications     PRN medications  PRN medications: acetaminophen **OR** acetaminophen **OR** acetaminophen, bisacodyl, glycopyrrolate, ipratropium-albuteroL, ondansetron **OR** ondansetron, oxyCODONE  Dietary Orders (From admission, onward)       Start     Ordered    10/06/24 0224  Enteral feeding with NPO 40; 200; Water; Tap water; Every 6 hours  " Diet effective now        Question Answer Comment   Tube feeding formula: Jevity 1.5    Tube feeding continuous rate (mL/hr): 40    Tube feeding flush (mL): 200    Flush type: Water    Water type: Tap water    Flush frequency: Every 6 hours        10/06/24 0227                    History:  Food and Nutrient History  Food and Nutrient History: Jevity 1.5, goal rate 40ml/hr h2o flush 200 x4    Anthropometrics:  Height: 160 cm (5' 2.99\")  Weight: (!) 43.9 kg (96 lb 12.5 oz)  BMI (Calculated): 17.15    Weight Change  Weight History / % Weight Change: no weight history; last known weight 42.9kg 9/14/23  Significant Weight Loss: No    IBW/kg (Dietitian Calculated): 56.4 kg    Estimated Energy Needs  Total Energy Estimated Needs (kCal): 1410 kCal  Total Estimated Energy Need per Day (kCal/kg): 1560 kCal/kg  Method for Estimating Needs: 25-28 IBW    Estimated Protein Needs  Total Protein Estimated Needs (g): 45 g  Total Protein Estimated Needs (g/kg): 55 g/kg  Method for Estimating Needs: 0.8-1.0 IBW    Estimated Fluid Needs  Method for Estimating Needs: 1ml/kcal or per MD    Nutrition Focused Physical Findings:  Subcutaneous Fat Loss  Orbital Fat Pads: Well nourished (slightly bulging fat pads)  Buccal Fat Pads: Well nourished (full, rounded cheeks)    Muscle Wasting  Temporalis: Mild-Moderate (slight depression)  Pectoralis (Clavicular Region): Mild-Moderate (some protrusion of clavicle)    Edema  Edema: none    Physical Findings (Nutrition Deficiency/Toxicity)  Skin: Positive (abdominal incision)     Nutrition Diagnosis   Malnutrition Diagnosis  Patient has Malnutrition Diagnosis: No    Patient has Nutrition Diagnosis: Yes  Nutrition Diagnosis 1: Inadequate oral intake  Diagnosis Status (1): New  Related to (1): chronic illness  As Evidenced by (1): pt NPO requiring enteral feeds to meet nutritional needs       Nutrition Diagnosis 2: Underweight  Diagnosis Status (2): New  Related to (2): chronic illness  As Evidenced " by (2): BMI 17.2       Nutrition Interventions/Recommendations   Food and/or Nutrient Delivery Interventions  Enteral Intake: Enteral nutrition site care, Feeding tube flush  Goal: Continue TF as ordered to cover 100% of pts nutritional needs: Jevity 1.5 at 40ml/hr continuous provides: 1440kcal, 61.2g protein & 730ml free h2o. Current flush prder 200 x4. total h20: 1530ml    Education Documentation  No documentation found.      Nutrition Monitoring and Evaluation   Food and Nutrient Related History  Enteral and Parenteral Nutrition Intake: Enteral nutrition intake, Enteral nutrition formula/solution  Criteria: Monitor TF tolerance; Contact nutrition services for intolerances    Anthropometrics: Body Composition/Growth/Weight History  Weight Change: Weight gain, Weight loss    Biochemical Data, Medical Tests and Procedures  Electrolyte and Renal Panel: Other (Comment)  Criteria: as clinically indicated    Gastrointestinal Profile: Other (Comment)  Criteria: as clinically indicated    Glucose/Endocrine Profile: Other (Comment)  Criteria: as clinically indicated    Nutritional Anemia Profile: Other (Comment)  Criteria: as clinically indicated    Vitamin Profile: Other (Comment)  Criteria: as clinically indicated    Nutrition Focused Physical Findings  Digestive System: Other (Comment)  Criteria: as clinically indicated    Muscles: Muscle atrophy    Skin: Impaired wound healing    Other: Stool output, Urine volume, Overall appearance    Follow Up  Time Spent (min): 60 minutes  Last Date of Nutrition Visit: 10/07/24  Nutrition Follow-Up Needed?: Dietitian to reassess per policy  Follow up Comment: CHERELLE CHIRINOS

## 2024-10-07 NOTE — ASSESSMENT & PLAN NOTE
Most likely related to inability to maintain airway post-procedurally (baclofen pump on 10/4/24); CT with bibasilar opacities, right greater than left; Pro-Stalin 4.69; febrile and tachycardic    #Acute hypoxic respiratory failure: 2/2 aspiration pna, deconditioning; baseline is room air, required 6-15L, now improving on 3L NC

## 2024-10-07 NOTE — PROGRESS NOTES
Between 7AM-7PM please message me via Epic Secure Chat.  After 7PM please page Nocturnist on call.    [facility] Hospitalist Progress Note      Vargas Romero    :  1995(29 y.o.)    MRN:  [id number]  Date: 10/07/24     Assessment and Plan:     Acute Hypoxic Respiratory Failure  Sepsis due to aspiration pneumonia  - lactate elevation resolved. Continue empiric zosyn. Urine antigens negative thus azithromycin stopped. Sputum cultures ordered, Blood cultures ngtd.  - Pulm and RT consulted; will need aggressive pulmonary hygiene, vaishali nebs  - wean O2 for goal o2 sat 88-92%; was on HFNC now on 3L NC  -If patient is not improving after 48 hours of IV therapy can consider steroids     Sinus tachycardia  - due to above; treat underlying issues not HR    Cerebral palsy with spastic quadriplegia  Seizure disorder  - continue cannabidiol, onfi, vimpat, keppra  - admitted after baclofen pump placement    GERD  - continue ppi    HTN  - continue norvasc, metoprolol        DVT Prophylaxis: subcutaneous Heparin    Disposition: continue to monitor inpatient, await consultant recommendations, await test results, and await clinical improvement    Electronically signed by Adan Coronel DO on 10/07/24 at 7:05 PM     Subjective:      Interval History:   Vitals and chart notes from overnight reviewed.   No acute issues overnight.   Patient seen and evaluated at bedside.   Continues to have frequent copious secretions per RT.     Review of Systems:   Unable: nonverbal    Current medications:  Scheduled Meds:amLODIPine, 7.5 mg, oral, Daily  azithromycin, 500 mg, oral, q24h  cannabidiol, 5 mg/kg, oral, BID  cloBAZam, 10 mg, oral, Nightly  cyanocobalamin, 500 mcg, oral, Daily  docusate sodium, 50 mg, oral, BID  ergocalciferol, 1,250 mcg, oral, Weekly  folic acid, 1 mg, oral, Daily  heparin (porcine), 5,000 Units, subcutaneous, q8h VAISHALI  ipratropium-albuteroL, 3 mL, nebulization, q6h  lacosamide, 50 mg, oral,  BID  levETIRAcetam, 1,500 mg, oral, BID  metoprolol tartrate, 25 mg, oral, BID  oxygen, , inhalation, Continuous - Inhalation  pantoprazole, 40 mg, oral, Daily before breakfast   Or  pantoprazole, 40 mg, intravenous, Daily before breakfast  piperacillin-tazobactam, 3.375 g, intravenous, q6h  senna, 5 mL, oral, Nightly      Continuous Infusions:   PRN Meds:PRN medications: acetaminophen **OR** acetaminophen **OR** acetaminophen, bisacodyl, glycopyrrolate, ipratropium-albuteroL, ondansetron **OR** ondansetron, oxyCODONE      Objective:     Heart Rate:  []   Temp:  [35.8 °C (96.5 °F)-37.7 °C (99.8 °F)]   Resp:  [17-18]   BP: (117-192)/()   SpO2:  [93 %-100 %]     Oxygen Dose: *3 L/min    Physical Exam  Vitals and nursing note reviewed.   Constitutional:       Appearance: He is ill-appearing.   HENT:      Mouth/Throat:      Mouth: Mucous membranes are moist.      Pharynx: Oropharynx is clear.   Cardiovascular:      Rate and Rhythm: Regular rhythm. Tachycardia present.   Pulmonary:      Effort: Pulmonary effort is normal.      Breath sounds: Rhonchi present.   Abdominal:      Palpations: Abdomen is soft.   Neurological:      Comments: Nonverbal         Labs:   Lab Results   Component Value Date     10/07/2024    K 5.0 10/07/2024     10/07/2024    CO2 24 10/07/2024    BUN 7 10/07/2024    CREATININE 0.58 10/07/2024    GLUCOSE 162 (H) 10/07/2024    CALCIUM 8.2 (L) 10/07/2024    PROT 7.6 10/05/2024    BILITOT 0.4 10/05/2024    ALKPHOS 74 10/05/2024    AST 58 (H) 10/05/2024    ALT 35 10/05/2024       Lab Results   Component Value Date    WBC 8.5 10/07/2024    HGB 13.6 10/07/2024    HCT 42.2 10/07/2024    MCV 82 10/07/2024     10/07/2024

## 2024-10-07 NOTE — PROGRESS NOTES
Vargas Romero is a 29 y.o. male on day 1 of admission presenting with Aspiration pneumonia due to anesthesia during labor and delivery, unspecified laterality, unspecified part of lung ([facility]-HCC).    Subjective   Patient seen and examined.  Family at bedside. Stable on 3 L NC satting 97%.  Patient with audible coarse breath sounds with weak cough.  Orally suctioned for off-white to pale yellow secretions.  No obvious indications of pain.  Remains febrile and tachycardic.     Objective   Physical Exam    Constitutional:   Thin, chronically ill, increased work of breathing without acute distress, unable to cooperate  HENT: Atraumatic, moist mucous membranes  Eyes: Nonicteric  Neck: Supple  Cardiovascular: S1, S2 normal, tachycardic, HR 120s, no murmur appreciated  Pulmonary: Diffuse coarse breath sounds that minimally improved with weak cough and oral suctioning  Abdominal: Soft, non distended, + BS  Musculoskeletal: Permanent contractures  Extremities:   Diffuse muscle wasting  Lymphadenopathy: No nuchal LAP  Skin: Warm, semimoist  Neurological: Awake, nonverbal, unable to follow commands    Medications:  amLODIPine, 7.5 mg, oral, Daily  azithromycin, 500 mg, oral, q24h  cannabidiol, 5 mg/kg, oral, BID  cloBAZam, 10 mg, oral, Nightly  cyanocobalamin, 500 mcg, oral, Daily  docusate sodium, 50 mg, oral, BID  ergocalciferol, 1,250 mcg, oral, Weekly  folic acid, 1 mg, oral, Daily  heparin (porcine), 5,000 Units, subcutaneous, q8h POPEYE  ipratropium-albuteroL, 3 mL, nebulization, q6h  lacosamide, 50 mg, oral, BID  levETIRAcetam, 1,500 mg, oral, BID  metoprolol tartrate, 25 mg, oral, BID  oxygen, , inhalation, Continuous - Inhalation  pantoprazole, 40 mg, oral, Daily before breakfast   Or  pantoprazole, 40 mg, intravenous, Daily before breakfast  piperacillin-tazobactam, 3.375 g, intravenous, q6h  senna, 5 mL, oral, Nightly    PRN medications: acetaminophen **OR** acetaminophen **OR** acetaminophen, bisacodyl,  "glycopyrrolate, ipratropium-albuteroL, ondansetron **OR** ondansetron, oxyCODONE      Last Recorded Vitals  Blood pressure (!) 182/104, pulse (!) 130, temperature 37.7 °C (99.8 °F), temperature source Temporal, resp. rate 17, height 1.6 m (5' 2.99\"), weight (!) 43.9 kg (96 lb 12.5 oz), SpO2 93%.  Intake/Output last 3 Shifts:  I/O last 3 completed shifts:  In: 1350 (30.8 mL/kg) [IV Piggyback:1350]  Out: 1000 (22.8 mL/kg) [Urine:1000 (0.6 mL/kg/hr)]  Weight: 43.9 kg     Relevant Results  Results for orders placed or performed during the hospital encounter of 10/05/24 (from the past 24 hour(s))   Lactate   Result Value Ref Range    Lactate 1.9 0.4 - 2.0 mmol/L   Renal Function Panel   Result Value Ref Range    Glucose 162 (H) 74 - 99 mg/dL    Sodium 137 136 - 145 mmol/L    Potassium 5.0 3.5 - 5.3 mmol/L    Chloride 105 98 - 107 mmol/L    Bicarbonate 24 21 - 32 mmol/L    Anion Gap 13 10 - 20 mmol/L    Urea Nitrogen 7 6 - 23 mg/dL    Creatinine 0.58 0.50 - 1.30 mg/dL    eGFR >90 >60 mL/min/1.73m*2    Calcium 8.2 (L) 8.6 - 10.3 mg/dL    Phosphorus 1.6 (L) 2.5 - 4.9 mg/dL    Albumin 3.5 3.4 - 5.0 g/dL   SST TOP   Result Value Ref Range    Extra Tube Hold for add-ons.    CBC and Auto Differential   Result Value Ref Range    WBC 8.5 4.4 - 11.3 x10*3/uL    nRBC 0.0 0.0 - 0.0 /100 WBCs    RBC 5.12 4.50 - 5.90 x10*6/uL    Hemoglobin 13.6 13.5 - 17.5 g/dL    Hematocrit 42.2 41.0 - 52.0 %    MCV 82 80 - 100 fL    MCH 26.6 26.0 - 34.0 pg    MCHC 32.2 32.0 - 36.0 g/dL    RDW 13.8 11.5 - 14.5 %    Platelets 159 150 - 450 x10*3/uL    Immature Granulocytes %, Automated 0.1 0.0 - 0.9 %    Immature Granulocytes Absolute, Automated 0.01 0.00 - 0.70 x10*3/uL   Manual Differential   Result Value Ref Range    Neutrophils %, Manual 72.0 40.0 - 80.0 %    Bands %, Manual 9.0 0.0 - 5.0 %    Lymphocytes %, Manual 15.0 13.0 - 44.0 %    Monocytes %, Manual 2.0 2.0 - 10.0 %    Eosinophils %, Manual 0.0 0.0 - 6.0 %    Basophils %, Manual 0.0 0.0 - " 2.0 %    Metamyelocytes %, Manual 2.0 0.0 - 0.0 %    Seg Neutrophils Absolute, Manual 6.12 1.20 - 7.00 x10*3/uL    Bands Absolute, Manual 0.77 (H) 0.00 - 0.70 x10*3/uL    Lymphocytes Absolute, Manual 1.28 1.20 - 4.80 x10*3/uL    Monocytes Absolute, Manual 0.17 0.10 - 1.00 x10*3/uL    Eosinophils Absolute, Manual 0.00 0.00 - 0.70 x10*3/uL    Basophils Absolute, Manual 0.00 0.00 - 0.10 x10*3/uL    Metamyelocytes Absolute, Manual 0.17 0.00 - 0.00 x10*3/uL    Total Cells Counted 100     Neutrophils Absolute, Manual 6.89 1.20 - 7.70 x10*3/uL    RBC Morphology No significant RBC morphology present       XR chest 1 view  Result Date: 10/6/2024  Interpreted By:  Anthony Silva, STUDY: XR CHEST 1 VIEW;  10/6/2024 6:39 pm   INDICATION: Signs/Symptoms:hypoxia.     COMPARISON: 10/05/2024   ACCESSION NUMBER(S): [id number]   ORDERING CLINICIAN: KENNETH MCADAMS   FINDINGS:   There is multifocal airspace disease in the lungs predominantly at the right lung base, worsened from the prior. Multifocal pneumonia versus aspiration pneumonitis in the differential. No large effusion seen. No pneumothorax. The cardiac silhouette is within normal limits for size.       1. Extensive bilateral multifocal airspace disease worse in the right lung base. Underlying pneumonia and aspiration pneumonitis in the differential       MACRO: None   Signed by: Anthony Silva 10/6/2024 6:42 PM Dictation workstation:   [id number]    CT angio chest for pulmonary embolism  Result Date: 10/6/2024  Interpreted By:  Timothy Quinn, STUDY: CT ANGIO CHEST FOR PULMONARY EMBOLISM; CT ABDOMEN PELVIS W IV CONTRAST;  10/6/2024 12:37 am   INDICATION: Signs/Symptoms:ams, hypoxia; Signs/Symptoms:recent baclofen pump placement, ams   COMPARISON: None.   ACCESSION NUMBER(S): [id number]; [id number]   ORDERING CLINICIAN: LAYA LA   TECHNIQUE: CT of the chest, abdomen, and pelvis was performed. Contiguous axial images were obtained through the chest,  abdomen and pelvis. Coronal and sagittal reconstructions were performed. Intravenous contrast was administered, 75 mL Omnipaque 350. Chest images were acquired in the pulmonary angiographic phase. MIP/3D reconstructions were performed on a separate workstation and provided for interpretation.   FINDINGS: CHEST:   LOWER NECK AND CHEST WALL:  Within normal limits.   MEDIASTINUM/TAVARES:No lymphadenopathy. Esophagus is unremarkable.   CARDIOVASCULAR:  Cardiac chamber size within normal limits. No pericardial effusion.  Aortic caliber normal. Normal caliber of main pulmonary artery. No pulmonary embolism. Motion artifact limits evaluation of subsegmental pulmonary arteries.   LUNGS, AIRWAYS, AND PLEURA:  Patchy right-greater-than-left lower lobe consolidative and ground-glass opacities. Debris within the dependent trachea.   MUSCULOSKELETAL: No acute osseous abnormality or suspicious osseous lesions.  Intraspinal medication catheter in place.       ABDOMEN:   LIVER: Within normal limits.   BILE DUCTS: Normal caliber.   GALLBLADDER: No calcified stones. No wall thickening.   PANCREAS: Within normal limits.   SPLEEN: Within normal limits.   ADRENALS: Within normal limits.   KIDNEYS, URETERS, and BLADDER: Early contrast excretion limits evaluation for small renal calculi. No hydronephrosis.  Ureters are non-dilated. Urinary bladder within normal limits.   REPRODUCTIVE: No pelvic masses.   VESSELS: Aorta and IVC appear normal.   RETROPERITONEUM and LYMPH NODES: No lymphadenopathy.   BOWEL: Gastrostomy tube in place. Small bowel is non-dilated. Normal appendix. Large bowel is normal.   PERITONEUM: No ascites or free air. No fluid collection.   BODY WALL: Right lower quadrant subcutaneous medication pump in place; metallic streak artifact limits evaluation of adjacent structures. There is soft tissue edema and soft tissue gas surrounding the pump compatible with recent placement. No distinct organized fluid collection is  identified.   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 Thoracolumbar dextrocurvature. No acute osseous abnormality.         1. Patchy right-greater-than-left lower lobe consolidative and ground-glass opacities compatible with pneumonia. Debris within the dependent trachea raising concern for aspiration. 2. No pulmonary embolism. Motion artifact limits evaluation of subsegmental pulmonary arteries. 3. Right lower quadrant subcutaneous medication pump in place; metallic streak artifact limits evaluation of adjacent structures. There is soft tissue edema and soft tissue gas surrounding the pump compatible with recent placement. No distinct organized fluid collection is identified.     Signed by: Timothy Quinn 10/6/2024 1:27 AM Dictation workstation:   [id number]    XR chest 1 view  Result Date: 10/6/2024  Interpreted By:  Felix Devlin, STUDY: XR CHEST 1 VIEW;  10/5/2024 11:25 pm   INDICATION: Signs/Symptoms:hypoxia.   COMPARISON: None.   ACCESSION NUMBER(S): [id number]   ORDERING CLINICIAN: LAYA LA   FINDINGS:     CARDIOMEDIASTINAL SILHOUETTE: Rightward mediastinal shift.   LUNGS: The right lung is asymmetrically lucent compared to the left. No definite pleural effusion or pneumothorax.   ABDOMEN: Gas in the left upper quadrant, possibly in the stomach, but extraluminal air is not excluded.   BONES: No acute osseous abnormality.       The right lung is asymmetrically lucent compared to the left. This finding is nonspecific and may represent congenital lobar emphysema, trapped air secondary to endobronchial obstruction, or left-sided airspace disease. Recommend chest CT to better evaluate.   Prominent gas is present in the left upper quadrant, possibly in the colon and stomach but extraluminal air is not excluded. This can also be evaluated on the above recommended chest CT.   MACRO: None   Signed by: Felix Devlin  10/6/2024 12:13 AM Dictation workstation:   [id number]      Assessment/Plan     29 y.o. male admitted on 10/5/2024 10:15 PM for acute hypoxic respiratory failure. He has a past history significant for cerebral palsy with spastic quadriplegia, PEG tube. Baclofen pump placement on 10/4/2024.  At baseline he is very interactive and enjoys singing; somnolent on admission. Usual state of health afterward with wet cough,copious secretions and mental decline beginning 10/5.  Baseline, no supplemental oxygen needs, however, upon ER triage he was hypoxic and was started on 6 L/min.  Labs on admission were notable for WBC count of 5.3, BNP of 24, negative SARS-CoV-2, influenza testing.  Imaging notable for bibasilar consolidations and debris within the trachea on CT.  Pulmonary embolism was ruled out.. He is currently receiving pip-tazo, azithromycin and also received a dose of gentamicin in the emergency room.    Assessment & Plan  Aspiration pneumonia due to anesthesia during labor and delivery, unspecified laterality, unspecified part of lung (HHS-HCC)  Most likely related to inability to maintain airway post-procedurally (baclofen pump on 10/4/24); CT with bibasilar opacities, right greater than left; Pro-Stalin 4.69; febrile and tachycardic    #Acute hypoxic respiratory failure: 2/2 aspiration pna, deconditioning; baseline is room air, required 6-15L, now improving on 3L NC    Recommendations:  -Continue present antibiotics (azithro and zosyn)  -Titrate O2 to maintain SpO2 90 to 92%.  Currently on 3 L/min, Pox 97%   -Continue DuoNebs every 6  -Bronchopulmonary hygiene as per RT  -If patient is not improving after 48 hours of IV therapy can consider steroids  -Send sputum for culture if able  -Strep and Legionella antigens both negative  -MRSA colonization pending  -Follow-up blood cultures (NGTD)    I spent 35 minutes in the professional and overall care of this patient.   LIBERTAD Abdul-CNS

## 2024-10-07 NOTE — CONSULTS
"    Department of Medicine  Division of Pulmonary, Critical Care, and Sleep Medicine  Location  [facility]    Reason for consult: \"acute hypoxic respiratory failure requiring HFNC\"   Requesting physician: Adan Coronel MD    Physician HPI (10/6/2024):  29 y.o. male admitted on 10/5/2024 10:15 PM for acute hypoxic respiratory failure. He has a past history significant for cerebral palsy with spastic quadriplegia, PEG tube.  He had baclofen pump placement on 10/4/2024.  History is obtained from his parents as the patient is quite somnolent at this time.  At baseline he is very interactive and enjoys singing.  After his procedure was performed, patient was in his usual state of health.  However, on 10/5/2024, his mom noticed that he was starting to have a wet cough and having copious amounts of secretions.  His mental status also declined.  At baseline, he has no supplemental oxygen needs, however, on upon ER triage she was hypoxic and was started on 6 L/min.  His mother states that he usually gets an annual case of aspiration pneumonia. She is concerned about his fevers.    Currently, he is on 7 L/min and his SpO2 is 100%.  Labs on admission were notable for WBC count of 5.3, BNP of 24, negative SARS-CoV-2, influenza testing.  His lactate was as high as 2.8, but has since normalized.  Procalcitonin, strep and Legionella urine antigens are in process. Imaging notable for bibasilar consolidations and debris within the trachea on CT.  Pulmonary embolism was ruled out.. He is currently receiving pip-tazo, azithromycin and also received a dose of gentamicin in the emergency room.         PMH:  Past Medical History:   Diagnosis Date    Allergic rhinitis     Dysphagia     Essential hypertension     GERD (gastroesophageal reflux disease)     Intractable seizure disorder (Multi)     Malnutrition (Multi)     PEG (percutaneous endoscopic gastrostomy) status (Multi)     Seasonal allergies     Severe intellectual " disability        PSH:  Past Surgical History:   Procedure Laterality Date    OTHER SURGICAL HISTORY      Orchiectomy    OTHER SURGICAL HISTORY      Placement of baclofen pump    OTHER SURGICAL HISTORY      Tendon release    OTHER SURGICAL HISTORY      PEG       FHx:  No family history on file.    Social Hx:  Social History     Socioeconomic History    Marital status: Single   Tobacco Use    Smoking status: Never    Smokeless tobacco: Never   Substance and Sexual Activity    Alcohol use: Never    Drug use: Never     Social Determinants of Health     Transportation Needs: Unmet Transportation Needs (2/18/2021)    [facility]     Lack of Transportation (Medical): Yes     Lack of Transportation (Non-Medical): Yes       Immunization History:    There is no immunization history on file for this patient.    Current Medications:  Scheduled medications  amLODIPine, 7.5 mg, oral, Daily  azithromycin, 500 mg, intravenous, q24h  cannabidiol, 5 mg/kg, oral, BID  cloBAZam, 20 mg, oral, Nightly  cyanocobalamin, 500 mcg, oral, Daily  docusate sodium, 50 mg, oral, BID  ergocalciferol, 1,250 mcg, oral, Weekly  folic acid, 1 mg, oral, Daily  heparin (porcine), 5,000 Units, subcutaneous, q8h POPEYE  ipratropium-albuteroL, 3 mL, nebulization, q6h  lacosamide, 50 mg, oral, BID  levETIRAcetam, 1,500 mg, oral, BID  metoprolol, 5 mg, intravenous, Once  metoprolol tartrate, 25 mg, oral, BID  oxygen, , inhalation, Continuous - Inhalation  pantoprazole, 40 mg, oral, Daily before breakfast   Or  pantoprazole, 40 mg, intravenous, Daily before breakfast  piperacillin-tazobactam, 3.375 g, intravenous, q6h  senna, 5 mL, oral, Nightly      Continuous medications  sodium chloride 0.9%, 100 mL/hr, Last Rate: 100 mL/hr (10/06/24 1633)      PRN medications  PRN medications: acetaminophen **OR** acetaminophen **OR** acetaminophen, bisacodyl, glycopyrrolate, ipratropium-albuteroL, ondansetron **OR**  "ondansetron, oxyCODONE     Drug Allergies/Intolerances:  No Known Allergies     Review of Systems:  Review of Systems   Unable to perform ROS: Mental status change        Physical Examination:      10/6/2024     5:30 AM 10/6/2024     6:39 AM 10/6/2024     8:00 AM 10/6/2024    11:00 AM 10/6/2024     3:00 PM 10/6/2024     5:45 PM 10/6/2024     7:31 PM   Vitals   Systolic 142 147 131 122 181 171 173   Diastolic 92 74 71 65 104 101 109   Heart Rate 75 78 78  126     Temp  36.2 °C (97.1 °F) 35.9 °C (96.7 °F) 37.1 °C (98.7 °F) 37.7 °C (99.8 °F) 37.7 °C (99.9 °F)    Resp 18 18 18 16  12    Height (in)  1.6 m (5' 2.99\")        Weight (lb)  96.78        BMI  17.15 kg/m2        BSA (m2)  1.4 m2              GEN: appears somnolent. Wet sounding cough.  ENT: wearing O2 nasal cannujla  CV: tachycardic, regular  LUNGS: moderate effort, rhonchi bilaterally  EXT: contractures      Pulmonary Function Test Results     None    Exacerbation History     N/A    Imaging     CTA chest 10/5/2024:  1. Patchy right-greater-than-left lower lobe consolidative and  ground-glass opacities compatible with pneumonia. Debris within the  dependent trachea raising concern for aspiration.  2. No pulmonary embolism. Motion artifact limits evaluation of  subsegmental pulmonary arteries.  3. Right lower quadrant subcutaneous medication pump in place;  metallic streak artifact limits evaluation of adjacent structures.  There is soft tissue edema and soft tissue gas surrounding the pump  compatible with recent placement. No distinct organized fluid  collection is identified.    Bronchoscopy     None    Labs     Results for orders placed or performed during the hospital encounter of 10/05/24 (from the past 24 hour(s))   CBC and Auto Differential   Result Value Ref Range    WBC 5.3 4.4 - 11.3 x10*3/uL    nRBC 0.0 0.0 - 0.0 /100 WBCs    RBC 5.35 4.50 - 5.90 x10*6/uL    Hemoglobin 14.3 13.5 - 17.5 g/dL    Hematocrit 45.4 41.0 - 52.0 %    MCV 85 80 - 100 fL    " MCH 26.7 26.0 - 34.0 pg    MCHC 31.5 (L) 32.0 - 36.0 g/dL    RDW 13.5 11.5 - 14.5 %    Platelets 159 150 - 450 x10*3/uL    Neutrophils % 72.1 40.0 - 80.0 %    Immature Granulocytes %, Automated 0.2 0.0 - 0.9 %    Lymphocytes % 14.6 13.0 - 44.0 %    Monocytes % 13.1 2.0 - 10.0 %    Eosinophils % 0.0 0.0 - 6.0 %    Basophils % 0.0 0.0 - 2.0 %    Neutrophils Absolute 3.80 1.20 - 7.70 x10*3/uL    Immature Granulocytes Absolute, Automated 0.01 0.00 - 0.70 x10*3/uL    Lymphocytes Absolute 0.77 (L) 1.20 - 4.80 x10*3/uL    Monocytes Absolute 0.69 0.10 - 1.00 x10*3/uL    Eosinophils Absolute 0.00 0.00 - 0.70 x10*3/uL    Basophils Absolute 0.00 0.00 - 0.10 x10*3/uL   Magnesium   Result Value Ref Range    Magnesium 1.90 1.60 - 2.40 mg/dL   Comprehensive metabolic panel   Result Value Ref Range    Glucose 131 (H) 74 - 99 mg/dL    Sodium 139 136 - 145 mmol/L    Potassium 3.7 3.5 - 5.3 mmol/L    Chloride 101 98 - 107 mmol/L    Bicarbonate 29 21 - 32 mmol/L    Anion Gap 13 10 - 20 mmol/L    Urea Nitrogen 7 6 - 23 mg/dL    Creatinine 0.65 0.50 - 1.30 mg/dL    eGFR >90 >60 mL/min/1.73m*2    Calcium 9.3 8.6 - 10.3 mg/dL    Albumin 4.3 3.4 - 5.0 g/dL    Alkaline Phosphatase 74 33 - 120 U/L    Total Protein 7.6 6.4 - 8.2 g/dL    AST 58 (H) 9 - 39 U/L    Bilirubin, Total 0.4 0.0 - 1.2 mg/dL    ALT 35 10 - 52 U/L   B-Type Natriuretic Peptide   Result Value Ref Range    BNP 24 0 - 99 pg/mL   Blood Gas Venous Full Panel   Result Value Ref Range    POCT pH, Venous 7.37 7.33 - 7.43 pH    POCT pCO2, Venous 58 (H) 41 - 51 mm Hg    POCT pO2, Venous 44 35 - 45 mm Hg    POCT SO2, Venous 64 45 - 75 %    POCT Oxy Hemoglobin, Venous 63.3 45.0 - 75.0 %    POCT Hematocrit Calculated, Venous 44.0 41.0 - 52.0 %    POCT Sodium, Venous 138 136 - 145 mmol/L    POCT Potassium, Venous 3.9 3.5 - 5.3 mmol/L    POCT Chloride, Venous 103 98 - 107 mmol/L    POCT Ionized Calicum, Venous 1.28 1.10 - 1.33 mmol/L    POCT Glucose, Venous 143 (H) 74 - 99 mg/dL    POCT  Lactate, Venous 3.6 (H) 0.4 - 2.0 mmol/L    POCT Base Excess, Venous 6.2 (H) -2.0 - 3.0 mmol/L    POCT HCO3 Calculated, Venous 33.5 (H) 22.0 - 26.0 mmol/L    POCT Hemoglobin, Venous 14.8 13.5 - 17.5 g/dL    POCT Anion Gap, Venous 5.0 (L) 10.0 - 25.0 mmol/L    Patient Temperature 37.0 degrees Celsius    FiO2 21 %   Blood Culture    Specimen: Peripheral Venipuncture; Blood culture   Result Value Ref Range    Blood Culture Loaded on Instrument - Culture in progress    Blood Culture    Specimen: Peripheral Venipuncture; Blood culture   Result Value Ref Range    Blood Culture Loaded on Instrument - Culture in progress    Troponin I, High Sensitivity, Initial   Result Value Ref Range    Troponin I, High Sensitivity 4 0 - 20 ng/L   Morphology   Result Value Ref Range    RBC Morphology No significant RBC morphology present    Sars-CoV-2 PCR   Result Value Ref Range    Coronavirus 2019, PCR Not Detected Not Detected   Influenza A, and B PCR   Result Value Ref Range    Flu A Result Not Detected Not Detected    Flu B Result Not Detected Not Detected   Troponin, High Sensitivity, 1 Hour   Result Value Ref Range    Troponin I, High Sensitivity 5 0 - 20 ng/L   Lactate   Result Value Ref Range    Lactate 2.4 (H) 0.4 - 2.0 mmol/L   Lactate   Result Value Ref Range    Lactate 2.8 (H) 0.4 - 2.0 mmol/L   Urinalysis with Reflex Culture and Microscopic   Result Value Ref Range    Color, Urine Colorless (N) Light-Yellow, Yellow, Dark-Yellow    Appearance, Urine Clear Clear    Specific Gravity, Urine 1.039 (N) 1.005 - 1.035    pH, Urine 6.5 5.0, 5.5, 6.0, 6.5, 7.0, 7.5, 8.0    Protein, Urine NEGATIVE NEGATIVE, 10 (TRACE), 20 (TRACE) mg/dL    Glucose, Urine 500 (3+) (A) Normal mg/dL    Blood, Urine NEGATIVE NEGATIVE    Ketones, Urine NEGATIVE NEGATIVE mg/dL    Bilirubin, Urine NEGATIVE NEGATIVE    Urobilinogen, Urine Normal Normal mg/dL    Nitrite, Urine NEGATIVE NEGATIVE    Leukocyte Esterase, Urine NEGATIVE NEGATIVE   Extra Urine Cotto  Tube   Result Value Ref Range    Extra Tube Hold for add-ons.    CBC   Result Value Ref Range    WBC 2.5 (L) 4.4 - 11.3 x10*3/uL    nRBC 0.0 0.0 - 0.0 /100 WBCs    RBC 4.44 (L) 4.50 - 5.90 x10*6/uL    Hemoglobin 11.7 (L) 13.5 - 17.5 g/dL    Hematocrit 37.6 (L) 41.0 - 52.0 %    MCV 85 80 - 100 fL    MCH 26.4 26.0 - 34.0 pg    MCHC 31.1 (L) 32.0 - 36.0 g/dL    RDW 13.6 11.5 - 14.5 %    Platelets 129 (L) 150 - 450 x10*3/uL   Basic metabolic panel   Result Value Ref Range    Glucose 106 (H) 74 - 99 mg/dL    Sodium 140 136 - 145 mmol/L    Potassium 3.7 3.5 - 5.3 mmol/L    Chloride 104 98 - 107 mmol/L    Bicarbonate 28 21 - 32 mmol/L    Anion Gap 12 10 - 20 mmol/L    Urea Nitrogen 6 6 - 23 mg/dL    Creatinine 0.59 0.50 - 1.30 mg/dL    eGFR >90 >60 mL/min/1.73m*2    Calcium 8.2 (L) 8.6 - 10.3 mg/dL   Lactate   Result Value Ref Range    Lactate 2.3 (H) 0.4 - 2.0 mmol/L   Lactate   Result Value Ref Range    Lactate 2.4 (H) 0.4 - 2.0 mmol/L   Lavender Top   Result Value Ref Range    Extra Tube Hold for add-ons.    PST Top   Result Value Ref Range    Extra Tube Hold for add-ons.    Lactate   Result Value Ref Range    Lactate 2.4 (H) 0.4 - 2.0 mmol/L   Lactate   Result Value Ref Range    Lactate 1.9 0.4 - 2.0 mmol/L         Echocardiogram     No results found for this or any previous visit from the past 365 days.       ASSESSMENT & PLAN     Summary:  29 y.o. male admitted on 10/5/2024 10:15 PM for acute metabolic encephalopathy in the setting of acute hypoxic respiratory failure secondary to bilateral lower lobe aspiration pneumonia.    Problem list:  Patient Active Problem List   Diagnosis    Spastic quadriplegic cerebral palsy (Multi)    HTN (hypertension)    Seizures (Multi)    Quadriplegia    Aspiration pneumonia due to anesthesia during labor and delivery, unspecified laterality, unspecified part of lung ([facility])        Recommendations:  -Continue present antibiotics  -Titrate FiO2 to maintain SpO2 between 90 to 92%.   Currently he is on 7 L/min and his SpO2 is 100%.  -Bronchopulmonary hygiene  -Send sputum for culture if able  -Follow-up urine strep and Legionella antigens  -Follow-up blood cultures    Blanche Barraza DO  Staff Physician - Pulmonary & Critical Care  10/06/24 8:37 PM

## 2024-10-08 ENCOUNTER — APPOINTMENT (OUTPATIENT)
Dept: RADIOLOGY | Facility: HOSPITAL | Age: 29
End: 2024-10-08
Payer: MEDICAID

## 2024-10-08 LAB
ALBUMIN SERPL BCP-MCNC: 3 G/DL (ref 3.4–5)
ANION GAP SERPL CALC-SCNC: 13 MMOL/L (ref 10–20)
BACTERIA SPEC RESP CULT: ABNORMAL
BASOPHILS # BLD MANUAL: 0 X10*3/UL (ref 0–0.1)
BASOPHILS NFR BLD MANUAL: 0 %
BUN SERPL-MCNC: 8 MG/DL (ref 6–23)
CALCIUM SERPL-MCNC: 8 MG/DL (ref 8.6–10.3)
CHLORIDE SERPL-SCNC: 100 MMOL/L (ref 98–107)
CO2 SERPL-SCNC: 27 MMOL/L (ref 21–32)
CREAT SERPL-MCNC: 0.57 MG/DL (ref 0.5–1.3)
EGFRCR SERPLBLD CKD-EPI 2021: >90 ML/MIN/1.73M*2
EOSINOPHIL # BLD MANUAL: 0 X10*3/UL (ref 0–0.7)
EOSINOPHIL NFR BLD MANUAL: 0 %
ERYTHROCYTE [DISTWIDTH] IN BLOOD BY AUTOMATED COUNT: 13.7 % (ref 11.5–14.5)
GLUCOSE SERPL-MCNC: 174 MG/DL (ref 74–99)
GRAM STN SPEC: ABNORMAL
GRAM STN SPEC: ABNORMAL
HCT VFR BLD AUTO: 39.1 % (ref 41–52)
HGB BLD-MCNC: 12.8 G/DL (ref 13.5–17.5)
IMM GRANULOCYTES # BLD AUTO: 0.02 X10*3/UL (ref 0–0.7)
IMM GRANULOCYTES NFR BLD AUTO: 0.2 % (ref 0–0.9)
LYMPHOCYTES # BLD MANUAL: 1.13 X10*3/UL (ref 1.2–4.8)
LYMPHOCYTES NFR BLD MANUAL: 12 %
MCH RBC QN AUTO: 26.8 PG (ref 26–34)
MCHC RBC AUTO-ENTMCNC: 32.7 G/DL (ref 32–36)
MCV RBC AUTO: 82 FL (ref 80–100)
MONOCYTES # BLD MANUAL: 0.38 X10*3/UL (ref 0.1–1)
MONOCYTES NFR BLD MANUAL: 4 %
MRSA DNA SPEC QL NAA+PROBE: NOT DETECTED
NEUTROPHILS # BLD MANUAL: 7.33 X10*3/UL (ref 1.2–7.7)
NEUTS BAND # BLD MANUAL: 1.5 X10*3/UL (ref 0–0.7)
NEUTS BAND NFR BLD MANUAL: 16 %
NEUTS SEG # BLD MANUAL: 5.83 X10*3/UL (ref 1.2–7)
NEUTS SEG NFR BLD MANUAL: 62 %
NRBC BLD-RTO: 0 /100 WBCS (ref 0–0)
PHOSPHATE SERPL-MCNC: 1.2 MG/DL (ref 2.5–4.9)
PLATELET # BLD AUTO: 157 X10*3/UL (ref 150–450)
POTASSIUM SERPL-SCNC: 3.5 MMOL/L (ref 3.5–5.3)
RBC # BLD AUTO: 4.77 X10*6/UL (ref 4.5–5.9)
RBC MORPH BLD: ABNORMAL
SODIUM SERPL-SCNC: 136 MMOL/L (ref 136–145)
STAPHYLOCOCCUS SPEC CULT: NORMAL
TOTAL CELLS COUNTED BLD: 100
VARIANT LYMPHS # BLD MANUAL: 0.56 X10*3/UL (ref 0–0.5)
VARIANT LYMPHS NFR BLD: 6 %
WBC # BLD AUTO: 9.4 X10*3/UL (ref 4.4–11.3)

## 2024-10-08 PROCEDURE — 70450 CT HEAD/BRAIN W/O DYE: CPT | Performed by: STUDENT IN AN ORGANIZED HEALTH CARE EDUCATION/TRAINING PROGRAM

## 2024-10-08 PROCEDURE — 2500000005 HC RX 250 GENERAL PHARMACY W/O HCPCS: Performed by: HOSPITALIST

## 2024-10-08 PROCEDURE — 99232 SBSQ HOSP IP/OBS MODERATE 35: CPT | Performed by: HOSPITALIST

## 2024-10-08 PROCEDURE — 36415 COLL VENOUS BLD VENIPUNCTURE: CPT | Performed by: HOSPITALIST

## 2024-10-08 PROCEDURE — 2500000004 HC RX 250 GENERAL PHARMACY W/ HCPCS (ALT 636 FOR OP/ED): Performed by: HOSPITALIST

## 2024-10-08 PROCEDURE — 2500000004 HC RX 250 GENERAL PHARMACY W/ HCPCS (ALT 636 FOR OP/ED): Performed by: STUDENT IN AN ORGANIZED HEALTH CARE EDUCATION/TRAINING PROGRAM

## 2024-10-08 PROCEDURE — 31720 CLEARANCE OF AIRWAYS: CPT

## 2024-10-08 PROCEDURE — 2500000001 HC RX 250 WO HCPCS SELF ADMINISTERED DRUGS (ALT 637 FOR MEDICARE OP): Performed by: INTERNAL MEDICINE

## 2024-10-08 PROCEDURE — 74177 CT ABD & PELVIS W/CONTRAST: CPT | Performed by: STUDENT IN AN ORGANIZED HEALTH CARE EDUCATION/TRAINING PROGRAM

## 2024-10-08 PROCEDURE — 94640 AIRWAY INHALATION TREATMENT: CPT

## 2024-10-08 PROCEDURE — 85007 BL SMEAR W/DIFF WBC COUNT: CPT | Performed by: HOSPITALIST

## 2024-10-08 PROCEDURE — 2500000001 HC RX 250 WO HCPCS SELF ADMINISTERED DRUGS (ALT 637 FOR MEDICARE OP): Performed by: HOSPITALIST

## 2024-10-08 PROCEDURE — 85027 COMPLETE CBC AUTOMATED: CPT | Performed by: HOSPITALIST

## 2024-10-08 PROCEDURE — 2500000004 HC RX 250 GENERAL PHARMACY W/ HCPCS (ALT 636 FOR OP/ED): Performed by: INTERNAL MEDICINE

## 2024-10-08 PROCEDURE — 2500000002 HC RX 250 W HCPCS SELF ADMINISTERED DRUGS (ALT 637 FOR MEDICARE OP, ALT 636 FOR OP/ED): Performed by: INTERNAL MEDICINE

## 2024-10-08 PROCEDURE — 2550000001 HC RX 255 CONTRASTS: Performed by: HOSPITALIST

## 2024-10-08 PROCEDURE — 99232 SBSQ HOSP IP/OBS MODERATE 35: CPT | Performed by: CLINICAL NURSE SPECIALIST

## 2024-10-08 PROCEDURE — 80069 RENAL FUNCTION PANEL: CPT | Performed by: HOSPITALIST

## 2024-10-08 PROCEDURE — 74177 CT ABD & PELVIS W/CONTRAST: CPT

## 2024-10-08 PROCEDURE — 87641 MR-STAPH DNA AMP PROBE: CPT | Performed by: HOSPITALIST

## 2024-10-08 PROCEDURE — 1200000002 HC GENERAL ROOM WITH TELEMETRY DAILY

## 2024-10-08 PROCEDURE — 70450 CT HEAD/BRAIN W/O DYE: CPT

## 2024-10-08 PROCEDURE — 2500000004 HC RX 250 GENERAL PHARMACY W/ HCPCS (ALT 636 FOR OP/ED): Performed by: CLINICAL NURSE SPECIALIST

## 2024-10-08 PROCEDURE — 87205 SMEAR GRAM STAIN: CPT | Mod: AHULAB | Performed by: HOSPITALIST

## 2024-10-08 PROCEDURE — 2500000004 HC RX 250 GENERAL PHARMACY W/ HCPCS (ALT 636 FOR OP/ED): Performed by: PHARMACIST

## 2024-10-08 RX ORDER — VANCOMYCIN HYDROCHLORIDE 750 MG/150ML
750 INJECTION, SOLUTION INTRAVENOUS ONCE
Status: COMPLETED | OUTPATIENT
Start: 2024-10-08 | End: 2024-10-08

## 2024-10-08 RX ORDER — LACOSAMIDE 50 MG/1
50 TABLET ORAL 2 TIMES DAILY
COMMUNITY

## 2024-10-08 RX ORDER — POLYETHYLENE GLYCOL 3350 17 G/17G
17 POWDER, FOR SOLUTION ORAL EVERY OTHER DAY
COMMUNITY

## 2024-10-08 RX ORDER — SODIUM,POTASSIUM PHOSPHATES 280-250MG
1 POWDER IN PACKET (EA) ORAL 4 TIMES DAILY
Status: COMPLETED | OUTPATIENT
Start: 2024-10-08 | End: 2024-10-08

## 2024-10-08 RX ORDER — PREDNISOLONE SODIUM PHOSPHATE 15 MG/5ML
40 SOLUTION ORAL DAILY
Status: DISCONTINUED | OUTPATIENT
Start: 2024-10-08 | End: 2024-10-10

## 2024-10-08 RX ORDER — LACOSAMIDE 50 MG/1
250 TABLET ORAL 2 TIMES DAILY
Status: DISCONTINUED | OUTPATIENT
Start: 2024-10-08 | End: 2024-10-13

## 2024-10-08 RX ORDER — BACLOFEN 1000 UG/ML
INJECTION, SOLUTION INTRATHECAL CONTINUOUS
COMMUNITY

## 2024-10-08 RX ORDER — VANCOMYCIN HYDROCHLORIDE 1 G/20ML
INJECTION, POWDER, LYOPHILIZED, FOR SOLUTION INTRAVENOUS DAILY PRN
Status: DISCONTINUED | OUTPATIENT
Start: 2024-10-08 | End: 2024-10-08

## 2024-10-08 RX ADMIN — PANTOPRAZOLE SODIUM 40 MG: 40 INJECTION, POWDER, FOR SOLUTION INTRAVENOUS at 05:13

## 2024-10-08 RX ADMIN — SENNOSIDES 5 ML: 8.8 LIQUID ORAL at 22:03

## 2024-10-08 RX ADMIN — Medication 13 L/MIN: at 20:35

## 2024-10-08 RX ADMIN — IPRATROPIUM BROMIDE AND ALBUTEROL SULFATE 3 ML: 2.5; .5 SOLUTION RESPIRATORY (INHALATION) at 08:15

## 2024-10-08 RX ADMIN — IOHEXOL 75 ML: 350 INJECTION, SOLUTION INTRAVENOUS at 21:30

## 2024-10-08 RX ADMIN — DOCUSATE SODIUM 50 MG: 50 LIQUID ORAL at 08:21

## 2024-10-08 RX ADMIN — DOCUSATE SODIUM 50 MG: 50 LIQUID ORAL at 22:03

## 2024-10-08 RX ADMIN — IPRATROPIUM BROMIDE AND ALBUTEROL SULFATE 3 ML: 2.5; .5 SOLUTION RESPIRATORY (INHALATION) at 00:59

## 2024-10-08 RX ADMIN — HEPARIN SODIUM 5000 UNITS: 5000 INJECTION INTRAVENOUS; SUBCUTANEOUS at 22:21

## 2024-10-08 RX ADMIN — HEPARIN SODIUM 5000 UNITS: 5000 INJECTION INTRAVENOUS; SUBCUTANEOUS at 05:13

## 2024-10-08 RX ADMIN — PIPERACILLIN SODIUM AND TAZOBACTAM SODIUM 3.38 G: 3; .375 INJECTION, SOLUTION INTRAVENOUS at 22:21

## 2024-10-08 RX ADMIN — FOLIC ACID 1 MG: 1 TABLET ORAL at 08:22

## 2024-10-08 RX ADMIN — AMLODIPINE BESYLATE 7.5 MG: 5 TABLET ORAL at 08:22

## 2024-10-08 RX ADMIN — HEPARIN SODIUM 5000 UNITS: 5000 INJECTION INTRAVENOUS; SUBCUTANEOUS at 13:13

## 2024-10-08 RX ADMIN — METOPROLOL TARTRATE 25 MG: 25 TABLET, FILM COATED ORAL at 08:22

## 2024-10-08 RX ADMIN — VANCOMYCIN HYDROCHLORIDE 750 MG: 750 INJECTION, SOLUTION INTRAVENOUS at 10:25

## 2024-10-08 RX ADMIN — IPRATROPIUM BROMIDE AND ALBUTEROL SULFATE 3 ML: 2.5; .5 SOLUTION RESPIRATORY (INHALATION) at 20:35

## 2024-10-08 RX ADMIN — POTASSIUM & SODIUM PHOSPHATES POWDER PACK 280-160-250 MG 1 PACKET: 280-160-250 PACK at 10:33

## 2024-10-08 RX ADMIN — Medication 5 L/MIN: at 10:33

## 2024-10-08 RX ADMIN — SODIUM CHLORIDE 500 ML: 9 INJECTION, SOLUTION INTRAVENOUS at 02:31

## 2024-10-08 RX ADMIN — LEVETIRACETAM 750 MG: 250 TABLET, FILM COATED ORAL at 22:04

## 2024-10-08 RX ADMIN — ACETAMINOPHEN 325MG 650 MG: 325 TABLET ORAL at 05:52

## 2024-10-08 RX ADMIN — LACOSAMIDE 250 MG: 50 TABLET, FILM COATED ORAL at 22:04

## 2024-10-08 RX ADMIN — IPRATROPIUM BROMIDE AND ALBUTEROL SULFATE 3 ML: 2.5; .5 SOLUTION RESPIRATORY (INHALATION) at 12:02

## 2024-10-08 RX ADMIN — PIPERACILLIN SODIUM AND TAZOBACTAM SODIUM 3.38 G: 3; .375 INJECTION, SOLUTION INTRAVENOUS at 05:13

## 2024-10-08 RX ADMIN — CYANOCOBALAMIN TAB 500 MCG 500 MCG: 500 TAB at 08:22

## 2024-10-08 RX ADMIN — GLYCOPYRROLATE 0.2 MG: 0.2 INJECTION, SOLUTION INTRAMUSCULAR; INTRAVITREAL at 03:47

## 2024-10-08 RX ADMIN — LEVETIRACETAM 1500 MG: 500 TABLET, FILM COATED ORAL at 08:22

## 2024-10-08 RX ADMIN — LACOSAMIDE 50 MG: 50 TABLET, FILM COATED ORAL at 08:21

## 2024-10-08 RX ADMIN — PREDNISOLONE SODIUM PHOSPHATE 40 MG: 15 SOLUTION ORAL at 13:13

## 2024-10-08 RX ADMIN — PIPERACILLIN SODIUM AND TAZOBACTAM SODIUM 3.38 G: 3; .375 INJECTION, SOLUTION INTRAVENOUS at 11:36

## 2024-10-08 RX ADMIN — CLOBAZAM 10 MG: 10 TABLET ORAL at 22:04

## 2024-10-08 RX ADMIN — POTASSIUM & SODIUM PHOSPHATES POWDER PACK 280-160-250 MG 1 PACKET: 280-160-250 PACK at 13:13

## 2024-10-08 RX ADMIN — METOPROLOL TARTRATE 25 MG: 25 TABLET, FILM COATED ORAL at 22:04

## 2024-10-08 RX ADMIN — PIPERACILLIN SODIUM AND TAZOBACTAM SODIUM 3.38 G: 3; .375 INJECTION, SOLUTION INTRAVENOUS at 16:27

## 2024-10-08 ASSESSMENT — COGNITIVE AND FUNCTIONAL STATUS - GENERAL
EATING MEALS: TOTAL
MOVING FROM LYING ON BACK TO SITTING ON SIDE OF FLAT BED WITH BEDRAILS: TOTAL
PERSONAL GROOMING: TOTAL
HELP NEEDED FOR BATHING: TOTAL
WALKING IN HOSPITAL ROOM: TOTAL
MOVING TO AND FROM BED TO CHAIR: TOTAL
TOILETING: TOTAL
MOBILITY SCORE: 6
DAILY ACTIVITIY SCORE: 6
DRESSING REGULAR LOWER BODY CLOTHING: TOTAL
CLIMB 3 TO 5 STEPS WITH RAILING: TOTAL
STANDING UP FROM CHAIR USING ARMS: TOTAL
DRESSING REGULAR UPPER BODY CLOTHING: TOTAL
TURNING FROM BACK TO SIDE WHILE IN FLAT BAD: TOTAL

## 2024-10-08 ASSESSMENT — PAIN SCALES - GENERAL: PAINLEVEL_OUTOF10: 0 - NO PAIN

## 2024-10-08 NOTE — PROGRESS NOTES
10/08/24 0906   Discharge Planning   Expected Discharge Disposition Home     I called and spoke to Leyla at [phone] she did confirm that the patient lives at home with her, she is his mother and primary caregiver, the plan on discharge would be to return home, barriers to discharge right now include weaning down his oxygen and blood cx's pending, she does have concerns regarding when his last BM was she stated it would have been on Friday and much of his seizure activity is related to constipation, and was wondering if he could have an Enema if he has not had a BM, I did reach out to the provider and bedside nurse to provide them with this information, I will continue to monitor for discharge planning.

## 2024-10-08 NOTE — PROGRESS NOTES
Outreach Attempt was made to schedule Overdue Care Gap.    The Outcome of the Outreach was Contact was not made, letter/portal message sent. Care Gaps discussed included Wellness Visits.     Pharmacy Medication History Review   Spoke to the patients mother. There were some dosage changes Vimpat 250 mg BID and Keppra was decreased 750 mg.  Pt has Baclofen Pump    Vargas Romero is a 29 y.o. male admitted for Aspiration pneumonia due to anesthesia during labor and delivery, unspecified laterality, unspecified part of lung ([facility]). Pharmacy reviewed the patient's feyyp-ic-gqvaaeveb medications and allergies for accuracy.    The list below reflectives the updated PTA list. Please review each medication in order reconciliation for additional clarification and justification.   The following updates were made to the Prior to Admission medication list:     Source of Information:     Medications ADDED:     Medications CHANGED:  Vimpat dose increase 250 mg BID  Keppra decrease 750 mg BID  Medications REMOVED:     Medications NOT TAKING:       Allergy reviewed : Yes    Comments:     Prior to Admission Medications   Prescriptions Last Dose Informant   acetaminophen (TylenoL) 325 mg tablet     Sig: Take 2 tablets (650 mg) by mouth every 6 hours if needed for mild pain (1 - 3).   amLODIPine (Norvasc) 5 mg tablet     Sig: Take 1.5 tablets (7.5 mg) by mouth once daily.   baclofen (Gablofen) 1,000 mcg/mL intrathecal injection     Sig: by intrathecal route continuously. Pump just changed Thursday 10/3/24   baclofen (Lioresal) 20 mg tablet     Sig: Take 1 tablet (20 mg) by mouth 3 times a day. Only when pump alarms.   bisacodyl (Dulcolax) 5 mg EC tablet     Sig: Take 1 tablet (5 mg) by mouth once daily as needed for constipation. Do not crush, chew, or split.   cannabidiol (Epidiolex) 100 mg/mL solution     Sig: Take by mouth.   cloBAZam (Onfi) 20 mg tablet     Sig: Take 0.5 tablets (10 mg) by mouth once daily at bedtime.   diazePAM (Valtoco) 20 mg/2 spray spray,non-aerosol nasal spray     Sig: Administer 1 spray into each nostril 1 time if needed for seizures.   docusate sodium (Colace) 50 mg/5 mL oral liquid     Sig:  Take 5 mL (50 mg) by mouth once daily.   ergocalciferol (Vitamin D-2) 1.25 MG (13766 UT) capsule 10/3/2024    Sig: Take 1 capsule (1,250 mcg) by mouth 1 (one) time per week. On Thursday   folic acid (Folvite) 1 mg tablet     Sig: Take by mouth once daily.   ipratropium-albuteroL (Duo-Neb) 0.5-2.5 mg/3 mL nebulizer solution     Sig: Take 3 mL by nebulization every 6 hours.   lacosamide (Vimpat) 200 mg tablet tablet     Sig: Take 1 tablet (200 mg) by mouth 2 times a day. Plus 50 mg ( 250 mg total )   lacosamide (Vimpat) 50 mg tablet     Sig: Take 1 tablet (50 mg) by mouth 2 times a day. Plus 200 mg (250 mg total)   levETIRAcetam (Keppra) 750 mg tablet     Sig: Take 1 tablet (750 mg) by mouth 2 times a day. Decreased dose   metoprolol tartrate (Lopressor) 25 mg tablet     Sig: Take 1 tablet (25 mg) by mouth 2 times a day.   multivitamin tablet     Sig: Take 1 tablet by mouth once daily.   oxyCODONE (Roxicodone) 5 mg immediate release tablet Not Taking    Sig: Take 1 tablet (5 mg) by mouth every 6 hours if needed for severe pain (7 - 10).   Patient not taking: Reported on 10/8/2024   polyethylene glycol (Glycolax, Miralax) 17 gram packet 10/3/2024    Sig: Take 17 g by mouth every other day.   senna (Senokot) 8.8 mg/5 mL syrup     Sig: Take by mouth once daily at bedtime.      Facility-Administered Medications: None       The list below reflectives the updated allergy list. Please review each documented allergy for additional clarification and justification.  Allergies  Reviewed by Viridiana Plata on 10/8/2024   No Known Allergies         Below are additional concerns with the patient's PTA list.      Viridiana Plata

## 2024-10-08 NOTE — PROGRESS NOTES
Between 7AM-7PM please message me via Epic Secure Chat.  After 7PM please page Nocturnist on call.    [facility] Hospitalist Progress Note      Vargas Romero    :  1995(29 y.o.)    MRN:  [id number]  Date: 10/08/24     Assessment and Plan:     Acute Hypoxic Respiratory Failure  Sepsis due to aspiration pneumonia  - lactate elevation resolved. Urine antigens negative thus azithromycin stopped. MRSA PCR negative. Sputum cultures ordered, Blood cultures ngtd. Continue IV Zosyn. Start steroids per pulm recs  - Pulm and RT consulted; will need aggressive pulmonary hygiene, vaishali nebs. NT Suctioning bid, Vest therapy.   - wean O2 for goal o2 sat 88-92%; back on 5L HFNC    Sinus tachycardia  - due to above; treat underlying issues not HR    Cerebral palsy with spastic quadriplegia  Seizure disorder  - continue cannabidiol, onfi, vimpat, keppra  - admitted after baclofen pump placement    GERD  - continue ppi    HTN  - continue norvasc, metoprolol        DVT Prophylaxis: subcutaneous Heparin    Disposition: continue to monitor inpatient, await consultant recommendations, await test results, and await clinical improvement    Electronically signed by Adan Coronel DO on 10/08/24 at 1:05 PM     Subjective:      Interval History:   Vitals and chart notes from overnight reviewed.   No acute issues overnight.   Patient seen and evaluated at bedside.   Continues to have frequent copious secretions. Pulm rec vaishali NT suctioning bid and vest physiotherapy.    Review of Systems:   Unable: nonverbal    Current medications:  Scheduled Meds:amLODIPine, 7.5 mg, oral, Daily  cannabidiol, 5 mg/kg, oral, BID  cloBAZam, 10 mg, oral, Nightly  cyanocobalamin, 500 mcg, oral, Daily  docusate sodium, 50 mg, oral, BID  ergocalciferol, 1,250 mcg, oral, Weekly  folic acid, 1 mg, oral, Daily  heparin (porcine), 5,000 Units, subcutaneous, q8h VAISHALI  ipratropium-albuteroL, 3 mL, nebulization, q6h  lacosamide, 50 mg, oral,  BID  levETIRAcetam, 1,500 mg, oral, BID  metoprolol tartrate, 25 mg, oral, BID  oxygen, , inhalation, Continuous - Inhalation  pantoprazole, 40 mg, oral, Daily before breakfast   Or  pantoprazole, 40 mg, intravenous, Daily before breakfast  piperacillin-tazobactam, 3.375 g, intravenous, q6h  potassium, sodium phosphates, 1 packet, g-tube, 4x daily  prednisoLONE, 40 mg, oral, Daily  senna, 5 mL, oral, Nightly      Continuous Infusions:   PRN Meds:PRN medications: acetaminophen **OR** acetaminophen **OR** acetaminophen, bisacodyl, glycopyrrolate, ipratropium-albuteroL, ondansetron **OR** ondansetron, oxyCODONE      Objective:     Heart Rate:  [101-134]   Temp:  [36.9 °C (98.5 °F)-38.9 °C (102.1 °F)]   Resp:  [17-31]   BP: (142-165)/(76-97)   SpO2:  [92 %-100 %]     Oxygen Dose: *5 L/min    Physical Exam  Vitals and nursing note reviewed.   Constitutional:       Appearance: He is ill-appearing.   HENT:      Mouth/Throat:      Mouth: Mucous membranes are moist.      Pharynx: Oropharynx is clear.   Cardiovascular:      Rate and Rhythm: Regular rhythm. Tachycardia present.   Pulmonary:      Effort: Pulmonary effort is normal.      Breath sounds: Rhonchi present.   Abdominal:      Palpations: Abdomen is soft.   Neurological:      Comments: Nonverbal         Labs:   Lab Results   Component Value Date     10/08/2024    K 3.5 10/08/2024     10/08/2024    CO2 27 10/08/2024    BUN 8 10/08/2024    CREATININE 0.57 10/08/2024    GLUCOSE 174 (H) 10/08/2024    CALCIUM 8.0 (L) 10/08/2024    PROT 7.6 10/05/2024    BILITOT 0.4 10/05/2024    ALKPHOS 74 10/05/2024    AST 58 (H) 10/05/2024    ALT 35 10/05/2024       Lab Results   Component Value Date    WBC 9.4 10/08/2024    HGB 12.8 (L) 10/08/2024    HCT 39.1 (L) 10/08/2024    MCV 82 10/08/2024     10/08/2024

## 2024-10-08 NOTE — DOCUMENTATION CLARIFICATION NOTE
"    PATIENT:               EDER LEONARDO  ACCT #:                  [id number]  MRN:                       [id number]  :                       1995  ADMIT DATE:       10/5/2024 10:15 PM  DISCH DATE:  RESPONDING PROVIDER #:        [id number]          PROVIDER RESPONSE TEXT:    Sepsis with neurological organ dysfunction of metabolic encephalopathy    CDI QUERY TEXT:    Clarification    Instruction:    Based on your assessment of the patient and the clinical information, please provide the requested documentation by clicking on the appropriate radio button and enter any additional information if prompted.    Question: Please further clarify if a relationship exists between the Sepsis and acute organ dysfunction    When answering this query, please exercise your independent professional judgment. The fact that a question is being asked, does not imply that any particular answer is desired or expected.    The patient's clinical indicators include:  Clinical Information: 28 y/o male presented with altered mental status and hypoxia. Admitted with sepsis, aspiration pneumonia, metabolic encephalopathy and acute hypoxic respiratory failure.    Clinical Indicators: VS on arrival T 36. 3 HR 92 RR 20 /100  10/5 VBG pH 7.37 pCO2 58 bicarb 33.5    Per ED note: \"He is unable to provide history himself but his close friend states he's been less responsive all day which is atypical for him, as he's usually interactive and alert.\" \"Lethargic\"    Per H&P: ?Metabolic encephalopathy secondary to pneumonia and hypoxia? \"He is alert. Mental status at baseline\"    Per 10/7 Progress note: ?Sepsis due to aspiration pneumonia?    Treatment: IV LR bolus in ED, Azithromycin 500 mg x 1 in ED, IV Vancomycin 500 mg x 1 in ED, IV zosyn 3.375 mg q 6 hrs (10/5-10/8)    Risk Factors: Hx cerebral palsy, spastic quadriplegia, seizures, HTN, GERD, dysphagia, PEG. Recent baclofen pump replacement.  Options provided:  -- Sepsis with neurological " organ dysfunction of metabolic encephalopathy  -- Other - I will add my own diagnosis  -- Refer to Clinical Documentation Reviewer    Query created by: Zoë Ledezma on 10/8/2024 10:34 AM      Electronically signed by:  KENNETH MCADAMS DO 10/8/2024 11:34 AM

## 2024-10-08 NOTE — PROGRESS NOTES
Vancomycin Dosing by Pharmacy- Cessation of Therapy    Consult to pharmacy for vancomycin dosing has been discontinued by the prescriber, pharmacy will sign off at this time.    Please call pharmacy if there are further questions or re-enter a consult if vancomycin is resumed.     Milly Funes, PharmD

## 2024-10-08 NOTE — PROGRESS NOTES
"Vargas Romero is a 29 y.o. male on day 2 of admission presenting with Aspiration pneumonia due to anesthesia during labor and delivery, unspecified laterality, unspecified part of lung ([facility]).    Subjective   Patient seen and examined.  Oxygen needs have increased to 5 L today.  Overnight patient febrile again and tachycardic into the 120s and 130s.  Sputum culture sent this morning.  Patient is a little more responsive today (eyes open quickly and easily with stimulation\") without obvious signs of pain, + increased work of breathing.  Discussed with Dr. Coronel; we will begin steroid trial, vest therapy with twice a day NT suctioning.     Mom arrived during exam.  Updated at bedside.  Instructed mom how to use the oral care cleaning system so she is able to assist with oral care as she is his primary caregiver at home.     Objective   Physical Exam    Constitutional:   Thin, chronically ill, increased work of breathing without acute distress  HENT: Atraumatic, moist mucous membranes  Eyes: Nonicteric  Neck: Supple  Cardiovascular: S1, S2 normal, tachycardic, HR 100s, no murmur appreciated  Pulmonary: Diffuse coarse breath sounds (slightly improved today); weak cough   Abdominal: semi-soft, non distended, + BS  Musculoskeletal: Permanent contractures; diffuse muscle wasting  Extremities: No BLE edema  Lymphadenopathy: No nuchal LAP  Skin: Warm, semimoist  Neurological: Awake, nonverbal, unable to follow commands    Medications:  amLODIPine, 7.5 mg, oral, Daily  cannabidiol, 5 mg/kg, oral, BID  cloBAZam, 10 mg, oral, Nightly  cyanocobalamin, 500 mcg, oral, Daily  docusate sodium, 50 mg, oral, BID  ergocalciferol, 1,250 mcg, oral, Weekly  folic acid, 1 mg, oral, Daily  heparin (porcine), 5,000 Units, subcutaneous, q8h POPEYE  ipratropium-albuteroL, 3 mL, nebulization, q6h  lacosamide, 50 mg, oral, BID  levETIRAcetam, 1,500 mg, oral, BID  metoprolol tartrate, 25 mg, oral, BID  oxygen, , inhalation, Continuous - " "Inhalation  pantoprazole, 40 mg, oral, Daily before breakfast   Or  pantoprazole, 40 mg, intravenous, Daily before breakfast  piperacillin-tazobactam, 3.375 g, intravenous, q6h  potassium, sodium phosphates, 1 packet, g-tube, 4x daily  senna, 5 mL, oral, Nightly  vancomycin, 750 mg, intravenous, Once    PRN medications: acetaminophen **OR** acetaminophen **OR** acetaminophen, bisacodyl, glycopyrrolate, ipratropium-albuteroL, ondansetron **OR** ondansetron, oxyCODONE, vancomycin      Last Recorded Vitals  Blood pressure (!) 157/97, pulse (!) 118, temperature 37.7 °C (99.9 °F), temperature source Temporal, resp. rate 25, height 1.6 m (5' 2.99\"), weight (!) 43.9 kg (96 lb 12.5 oz), SpO2 95%.  Intake/Output last 3 Shifts:  I/O last 3 completed shifts:  In: 2843.3 (64.8 mL/kg) [I.V.:1633.3 (37.2 mL/kg); NG/GT:660; IV Piggyback:550]  Out: 1450 (33 mL/kg) [Urine:1450 (0.9 mL/kg/hr)]  Weight: 43.9 kg     Relevant Results  Results for orders placed or performed during the hospital encounter of 10/05/24 (from the past 24 hour(s))   SST TOP   Result Value Ref Range    Extra Tube Hold for add-ons.    CBC and Auto Differential   Result Value Ref Range    WBC 8.5 4.4 - 11.3 x10*3/uL    nRBC 0.0 0.0 - 0.0 /100 WBCs    RBC 5.12 4.50 - 5.90 x10*6/uL    Hemoglobin 13.6 13.5 - 17.5 g/dL    Hematocrit 42.2 41.0 - 52.0 %    MCV 82 80 - 100 fL    MCH 26.6 26.0 - 34.0 pg    MCHC 32.2 32.0 - 36.0 g/dL    RDW 13.8 11.5 - 14.5 %    Platelets 159 150 - 450 x10*3/uL    Immature Granulocytes %, Automated 0.1 0.0 - 0.9 %    Immature Granulocytes Absolute, Automated 0.01 0.00 - 0.70 x10*3/uL   Manual Differential   Result Value Ref Range    Neutrophils %, Manual 72.0 40.0 - 80.0 %    Bands %, Manual 9.0 0.0 - 5.0 %    Lymphocytes %, Manual 15.0 13.0 - 44.0 %    Monocytes %, Manual 2.0 2.0 - 10.0 %    Eosinophils %, Manual 0.0 0.0 - 6.0 %    Basophils %, Manual 0.0 0.0 - 2.0 %    Metamyelocytes %, Manual 2.0 0.0 - 0.0 %    Seg Neutrophils " Absolute, Manual 6.12 1.20 - 7.00 x10*3/uL    Bands Absolute, Manual 0.77 (H) 0.00 - 0.70 x10*3/uL    Lymphocytes Absolute, Manual 1.28 1.20 - 4.80 x10*3/uL    Monocytes Absolute, Manual 0.17 0.10 - 1.00 x10*3/uL    Eosinophils Absolute, Manual 0.00 0.00 - 0.70 x10*3/uL    Basophils Absolute, Manual 0.00 0.00 - 0.10 x10*3/uL    Metamyelocytes Absolute, Manual 0.17 0.00 - 0.00 x10*3/uL    Total Cells Counted 100     Neutrophils Absolute, Manual 6.89 1.20 - 7.70 x10*3/uL    RBC Morphology No significant RBC morphology present    Renal Function Panel   Result Value Ref Range    Glucose 174 (H) 74 - 99 mg/dL    Sodium 136 136 - 145 mmol/L    Potassium 3.5 3.5 - 5.3 mmol/L    Chloride 100 98 - 107 mmol/L    Bicarbonate 27 21 - 32 mmol/L    Anion Gap 13 10 - 20 mmol/L    Urea Nitrogen 8 6 - 23 mg/dL    Creatinine 0.57 0.50 - 1.30 mg/dL    eGFR >90 >60 mL/min/1.73m*2    Calcium 8.0 (L) 8.6 - 10.3 mg/dL    Phosphorus 1.2 (L) 2.5 - 4.9 mg/dL    Albumin 3.0 (L) 3.4 - 5.0 g/dL   CBC and Auto Differential   Result Value Ref Range    WBC 9.4 4.4 - 11.3 x10*3/uL    nRBC 0.0 0.0 - 0.0 /100 WBCs    RBC 4.77 4.50 - 5.90 x10*6/uL    Hemoglobin 12.8 (L) 13.5 - 17.5 g/dL    Hematocrit 39.1 (L) 41.0 - 52.0 %    MCV 82 80 - 100 fL    MCH 26.8 26.0 - 34.0 pg    MCHC 32.7 32.0 - 36.0 g/dL    RDW 13.7 11.5 - 14.5 %    Platelets 157 150 - 450 x10*3/uL    Immature Granulocytes %, Automated 0.2 0.0 - 0.9 %    Immature Granulocytes Absolute, Automated 0.02 0.00 - 0.70 x10*3/uL   Manual Differential   Result Value Ref Range    Neutrophils %, Manual 62.0 40.0 - 80.0 %    Bands %, Manual 16.0 0.0 - 5.0 %    Lymphocytes %, Manual 12.0 13.0 - 44.0 %    Monocytes %, Manual 4.0 2.0 - 10.0 %    Eosinophils %, Manual 0.0 0.0 - 6.0 %    Basophils %, Manual 0.0 0.0 - 2.0 %    Atypical Lymphocytes %, Manual 6.0 0.0 - 2.0 %    Seg Neutrophils Absolute, Manual 5.83 1.20 - 7.00 x10*3/uL    Bands Absolute, Manual 1.50 (H) 0.00 - 0.70 x10*3/uL    Lymphocytes  Absolute, Manual 1.13 (L) 1.20 - 4.80 x10*3/uL    Monocytes Absolute, Manual 0.38 0.10 - 1.00 x10*3/uL    Eosinophils Absolute, Manual 0.00 0.00 - 0.70 x10*3/uL    Basophils Absolute, Manual 0.00 0.00 - 0.10 x10*3/uL    Atypical Lymphs Absolute, Manual 0.56 (H) 0.00 - 0.50 x10*3/uL    Total Cells Counted 100     Neutrophils Absolute, Manual 7.33 1.20 - 7.70 x10*3/uL    RBC Morphology No significant RBC morphology present       XR chest 1 view  Result Date: 10/6/2024  Interpreted By:  Anthony Silva, STUDY: XR CHEST 1 VIEW;  10/6/2024 6:39 pm   INDICATION: Signs/Symptoms:hypoxia.     COMPARISON: 10/05/2024   ACCESSION NUMBER(S): [id number]   ORDERING CLINICIAN: DEEP MCADAMS   FINDINGS:   There is multifocal airspace disease in the lungs predominantly at the right lung base, worsened from the prior. Multifocal pneumonia versus aspiration pneumonitis in the differential. No large effusion seen. No pneumothorax. The cardiac silhouette is within normal limits for size.       1. Extensive bilateral multifocal airspace disease worse in the right lung base. Underlying pneumonia and aspiration pneumonitis in the differential       MACRO: None   Signed by: Anthony Silva 10/6/2024 6:42 PM Dictation workstation:   [id number]    CT angio chest for pulmonary embolism  Result Date: 10/6/2024  Interpreted By:  Timothy Quinn, STUDY: CT ANGIO CHEST FOR PULMONARY EMBOLISM; CT ABDOMEN PELVIS W IV CONTRAST;  10/6/2024 12:37 am   INDICATION: Signs/Symptoms:ams, hypoxia; Signs/Symptoms:recent baclofen pump placement, ams   COMPARISON: None.   ACCESSION NUMBER(S): [id number]; [id number]   ORDERING CLINICIAN: LAYA LA   TECHNIQUE: CT of the chest, abdomen, and pelvis was performed. Contiguous axial images were obtained through the chest, abdomen and pelvis. Coronal and sagittal reconstructions were performed. Intravenous contrast was administered, 75 mL Omnipaque 350. Chest images were acquired in the pulmonary  angiographic phase. MIP/3D reconstructions were performed on a separate workstation and provided for interpretation.   FINDINGS: CHEST:   LOWER NECK AND CHEST WALL:  Within normal limits.   MEDIASTINUM/TAVARES:No lymphadenopathy. Esophagus is unremarkable.   CARDIOVASCULAR:  Cardiac chamber size within normal limits. No pericardial effusion.  Aortic caliber normal. Normal caliber of main pulmonary artery. No pulmonary embolism. Motion artifact limits evaluation of subsegmental pulmonary arteries.   LUNGS, AIRWAYS, AND PLEURA:  Patchy right-greater-than-left lower lobe consolidative and ground-glass opacities. Debris within the dependent trachea.   MUSCULOSKELETAL: No acute osseous abnormality or suspicious osseous lesions.  Intraspinal medication catheter in place.       ABDOMEN:   LIVER: Within normal limits.   BILE DUCTS: Normal caliber.   GALLBLADDER: No calcified stones. No wall thickening.   PANCREAS: Within normal limits.   SPLEEN: Within normal limits.   ADRENALS: Within normal limits.   KIDNEYS, URETERS, and BLADDER: Early contrast excretion limits evaluation for small renal calculi. No hydronephrosis.  Ureters are non-dilated. Urinary bladder within normal limits.   REPRODUCTIVE: No pelvic masses.   VESSELS: Aorta and IVC appear normal.   RETROPERITONEUM and LYMPH NODES: No lymphadenopathy.   BOWEL: Gastrostomy tube in place. Small bowel is non-dilated. Normal appendix. Large bowel is normal.   PERITONEUM: No ascites or free air. No fluid collection.   BODY WALL: Right lower quadrant subcutaneous medication pump in place; metallic streak artifact limits evaluation of adjacent structures. There is soft tissue edema and soft tissue gas surrounding the pump compatible with recent placement. No distinct organized fluid collection is identified.   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 Thoracolumbar dextrocurvature. No acute osseous  abnormality.         1. Patchy right-greater-than-left lower lobe consolidative and ground-glass opacities compatible with pneumonia. Debris within the dependent trachea raising concern for aspiration. 2. No pulmonary embolism. Motion artifact limits evaluation of subsegmental pulmonary arteries. 3. Right lower quadrant subcutaneous medication pump in place; metallic streak artifact limits evaluation of adjacent structures. There is soft tissue edema and soft tissue gas surrounding the pump compatible with recent placement. No distinct organized fluid collection is identified.     Signed by: Timothy Quinn 10/6/2024 1:27 AM Dictation workstation:   [id number]    XR chest 1 view  Result Date: 10/6/2024  Interpreted By:  Felix Devlin, STUDY: XR CHEST 1 VIEW;  10/5/2024 11:25 pm   INDICATION: Signs/Symptoms:hypoxia.   COMPARISON: None.   ACCESSION NUMBER(S): [id number]   ORDERING CLINICIAN: LAYA LA   FINDINGS:     CARDIOMEDIASTINAL SILHOUETTE: Rightward mediastinal shift.   LUNGS: The right lung is asymmetrically lucent compared to the left. No definite pleural effusion or pneumothorax.   ABDOMEN: Gas in the left upper quadrant, possibly in the stomach, but extraluminal air is not excluded.   BONES: No acute osseous abnormality.       The right lung is asymmetrically lucent compared to the left. This finding is nonspecific and may represent congenital lobar emphysema, trapped air secondary to endobronchial obstruction, or left-sided airspace disease. Recommend chest CT to better evaluate.   Prominent gas is present in the left upper quadrant, possibly in the colon and stomach but extraluminal air is not excluded. This can also be evaluated on the above recommended chest CT.   MACRO: None   Signed by: Felix Devlin 10/6/2024 12:13 AM Dictation workstation:   [id number]      Assessment/Plan     29 y.o. male admitted on 10/5/2024 10:15 PM for acute hypoxic respiratory failure. He has a past history  significant for cerebral palsy with spastic quadriplegia, PEG tube. Baclofen pump placement on 10/4/2024.  At baseline he is very interactive and enjoys singing; somnolent on admission. Usual state of health afterward with wet cough,copious secretions and mental decline beginning 10/5.  Baseline, no supplemental oxygen needs, however, upon ER triage he was hypoxic and was started on 6 L/min.  Labs on admission were notable for WBC count of 5.3, BNP of 24, negative SARS-CoV-2, influenza testing.  Imaging notable for bibasilar consolidations and debris within the trachea on CT.  Pulmonary embolism was ruled out.. He is currently receiving pip-tazo, azithromycin and also received a dose of gentamicin in the emergency room.    Assessment & Plan  Aspiration pneumonia due to anesthesia during labor and delivery, unspecified laterality, unspecified part of lung ([facility])  Most likely related to inability to maintain airway post-procedurally (baclofen pump on 10/4/24); CT with bibasilar opacities, right greater than left; Pro-Stalin 4.69; febrile and tachycardic    #Acute hypoxic respiratory failure: 2/2 aspiration pna, deconditioning; baseline is room air, required 6-15L, now improving on 3L NC    Recommendations:  -Continue present antibiotics: vanco and zosyn   -Titrate O2 to maintain SpO2 90 to 92%.  Currently on 5 L/min, Pox 93-94%   -Continue DuoNebs every 6  -Bronchopulmonary hygiene with mechanical vest; NT suctioning 2x/day; frequent oral suctioning  -Continues to be febrile, Tmax 38.9, tachypneic and tachycardic; starting prednisolone 40 mg daily   -Send sputum for culture if able - sent today  -Strep and Legionella antigens both negative  -MRSA negative  -Follow-up blood cultures (NGTD)    I spent 40 minutes in the professional and overall care of this patient.   LIBERTAD Abdul-CNS

## 2024-10-09 LAB
ALBUMIN SERPL BCP-MCNC: 3.2 G/DL (ref 3.4–5)
ANION GAP SERPL CALC-SCNC: 10 MMOL/L (ref 10–20)
BASOPHILS # BLD AUTO: 0.02 X10*3/UL (ref 0–0.1)
BASOPHILS NFR BLD AUTO: 0.2 %
BUN SERPL-MCNC: 11 MG/DL (ref 6–23)
CALCIUM SERPL-MCNC: 8.9 MG/DL (ref 8.6–10.3)
CHLORIDE SERPL-SCNC: 100 MMOL/L (ref 98–107)
CO2 SERPL-SCNC: 33 MMOL/L (ref 21–32)
CREAT SERPL-MCNC: 0.49 MG/DL (ref 0.5–1.3)
EGFRCR SERPLBLD CKD-EPI 2021: >90 ML/MIN/1.73M*2
EOSINOPHIL # BLD AUTO: 0.01 X10*3/UL (ref 0–0.7)
EOSINOPHIL NFR BLD AUTO: 0.1 %
ERYTHROCYTE [DISTWIDTH] IN BLOOD BY AUTOMATED COUNT: 14 % (ref 11.5–14.5)
GLUCOSE SERPL-MCNC: 195 MG/DL (ref 74–99)
HCT VFR BLD AUTO: 38.5 % (ref 41–52)
HGB BLD-MCNC: 12.1 G/DL (ref 13.5–17.5)
IMM GRANULOCYTES # BLD AUTO: 0.06 X10*3/UL (ref 0–0.7)
IMM GRANULOCYTES NFR BLD AUTO: 0.6 % (ref 0–0.9)
LYMPHOCYTES # BLD AUTO: 1.04 X10*3/UL (ref 1.2–4.8)
LYMPHOCYTES NFR BLD AUTO: 10.6 %
MCH RBC QN AUTO: 26.3 PG (ref 26–34)
MCHC RBC AUTO-ENTMCNC: 31.4 G/DL (ref 32–36)
MCV RBC AUTO: 84 FL (ref 80–100)
MONOCYTES # BLD AUTO: 0.91 X10*3/UL (ref 0.1–1)
MONOCYTES NFR BLD AUTO: 9.3 %
NEUTROPHILS # BLD AUTO: 7.73 X10*3/UL (ref 1.2–7.7)
NEUTROPHILS NFR BLD AUTO: 79.2 %
NRBC BLD-RTO: 0 /100 WBCS (ref 0–0)
PHOSPHATE SERPL-MCNC: 1 MG/DL (ref 2.5–4.9)
PLATELET # BLD AUTO: 160 X10*3/UL (ref 150–450)
POTASSIUM SERPL-SCNC: 3.4 MMOL/L (ref 3.5–5.3)
RBC # BLD AUTO: 4.6 X10*6/UL (ref 4.5–5.9)
SODIUM SERPL-SCNC: 140 MMOL/L (ref 136–145)
WBC # BLD AUTO: 9.8 X10*3/UL (ref 4.4–11.3)

## 2024-10-09 PROCEDURE — 2500000004 HC RX 250 GENERAL PHARMACY W/ HCPCS (ALT 636 FOR OP/ED): Performed by: STUDENT IN AN ORGANIZED HEALTH CARE EDUCATION/TRAINING PROGRAM

## 2024-10-09 PROCEDURE — 85025 COMPLETE CBC W/AUTO DIFF WBC: CPT | Performed by: HOSPITALIST

## 2024-10-09 PROCEDURE — 94669 MECHANICAL CHEST WALL OSCILL: CPT

## 2024-10-09 PROCEDURE — 2500000001 HC RX 250 WO HCPCS SELF ADMINISTERED DRUGS (ALT 637 FOR MEDICARE OP): Performed by: INTERNAL MEDICINE

## 2024-10-09 PROCEDURE — 94640 AIRWAY INHALATION TREATMENT: CPT

## 2024-10-09 PROCEDURE — 2500000004 HC RX 250 GENERAL PHARMACY W/ HCPCS (ALT 636 FOR OP/ED): Performed by: CLINICAL NURSE SPECIALIST

## 2024-10-09 PROCEDURE — 1200000002 HC GENERAL ROOM WITH TELEMETRY DAILY

## 2024-10-09 PROCEDURE — 80069 RENAL FUNCTION PANEL: CPT | Performed by: HOSPITALIST

## 2024-10-09 PROCEDURE — 36415 COLL VENOUS BLD VENIPUNCTURE: CPT | Performed by: HOSPITALIST

## 2024-10-09 PROCEDURE — 2500000001 HC RX 250 WO HCPCS SELF ADMINISTERED DRUGS (ALT 637 FOR MEDICARE OP): Performed by: HOSPITALIST

## 2024-10-09 PROCEDURE — 99232 SBSQ HOSP IP/OBS MODERATE 35: CPT | Performed by: STUDENT IN AN ORGANIZED HEALTH CARE EDUCATION/TRAINING PROGRAM

## 2024-10-09 PROCEDURE — 2500000001 HC RX 250 WO HCPCS SELF ADMINISTERED DRUGS (ALT 637 FOR MEDICARE OP)

## 2024-10-09 PROCEDURE — 2500000002 HC RX 250 W HCPCS SELF ADMINISTERED DRUGS (ALT 637 FOR MEDICARE OP, ALT 636 FOR OP/ED): Performed by: INTERNAL MEDICINE

## 2024-10-09 PROCEDURE — 99232 SBSQ HOSP IP/OBS MODERATE 35: CPT | Performed by: CLINICAL NURSE SPECIALIST

## 2024-10-09 PROCEDURE — 2500000005 HC RX 250 GENERAL PHARMACY W/O HCPCS: Performed by: HOSPITALIST

## 2024-10-09 PROCEDURE — 2500000005 HC RX 250 GENERAL PHARMACY W/O HCPCS: Performed by: STUDENT IN AN ORGANIZED HEALTH CARE EDUCATION/TRAINING PROGRAM

## 2024-10-09 PROCEDURE — 31720 CLEARANCE OF AIRWAYS: CPT

## 2024-10-09 PROCEDURE — 2500000004 HC RX 250 GENERAL PHARMACY W/ HCPCS (ALT 636 FOR OP/ED): Performed by: INTERNAL MEDICINE

## 2024-10-09 RX ORDER — ACETAMINOPHEN 160 MG/5ML
650 SOLUTION ORAL EVERY 4 HOURS PRN
Status: DISCONTINUED | OUTPATIENT
Start: 2024-10-09 | End: 2024-10-16 | Stop reason: HOSPADM

## 2024-10-09 RX ADMIN — PREDNISOLONE SODIUM PHOSPHATE 40 MG: 15 SOLUTION ORAL at 09:28

## 2024-10-09 RX ADMIN — LEVETIRACETAM 750 MG: 250 TABLET, FILM COATED ORAL at 09:20

## 2024-10-09 RX ADMIN — PIPERACILLIN SODIUM AND TAZOBACTAM SODIUM 3.38 G: 3; .375 INJECTION, SOLUTION INTRAVENOUS at 17:10

## 2024-10-09 RX ADMIN — IPRATROPIUM BROMIDE AND ALBUTEROL SULFATE 3 ML: 2.5; .5 SOLUTION RESPIRATORY (INHALATION) at 13:48

## 2024-10-09 RX ADMIN — IPRATROPIUM BROMIDE AND ALBUTEROL SULFATE 3 ML: 2.5; .5 SOLUTION RESPIRATORY (INHALATION) at 20:26

## 2024-10-09 RX ADMIN — FOLIC ACID 1 MG: 1 TABLET ORAL at 09:21

## 2024-10-09 RX ADMIN — GLYCOPYRROLATE 0.2 MG: 0.2 INJECTION, SOLUTION INTRAMUSCULAR; INTRAVITREAL at 11:11

## 2024-10-09 RX ADMIN — HEPARIN SODIUM 5000 UNITS: 5000 INJECTION INTRAVENOUS; SUBCUTANEOUS at 05:58

## 2024-10-09 RX ADMIN — HEPARIN SODIUM 5000 UNITS: 5000 INJECTION INTRAVENOUS; SUBCUTANEOUS at 14:27

## 2024-10-09 RX ADMIN — LACOSAMIDE 250 MG: 50 TABLET, FILM COATED ORAL at 09:21

## 2024-10-09 RX ADMIN — SODIUM CHLORIDE, POTASSIUM CHLORIDE, SODIUM LACTATE AND CALCIUM CHLORIDE 500 ML: 600; 310; 30; 20 INJECTION, SOLUTION INTRAVENOUS at 14:16

## 2024-10-09 RX ADMIN — IPRATROPIUM BROMIDE AND ALBUTEROL SULFATE 3 ML: 2.5; .5 SOLUTION RESPIRATORY (INHALATION) at 00:33

## 2024-10-09 RX ADMIN — POTASSIUM PHOSPHATE, MONOBASIC AND POTASSIUM PHOSPHATE, DIBASIC 21 MMOL: 224; 236 INJECTION, SOLUTION, CONCENTRATE INTRAVENOUS at 18:56

## 2024-10-09 RX ADMIN — LEVETIRACETAM 750 MG: 250 TABLET, FILM COATED ORAL at 21:16

## 2024-10-09 RX ADMIN — Medication 10 L/MIN: at 07:32

## 2024-10-09 RX ADMIN — PANTOPRAZOLE SODIUM 40 MG: 40 INJECTION, POWDER, FOR SOLUTION INTRAVENOUS at 05:58

## 2024-10-09 RX ADMIN — SENNOSIDES 5 ML: 8.8 LIQUID ORAL at 21:16

## 2024-10-09 RX ADMIN — CLOBAZAM 10 MG: 10 TABLET ORAL at 21:16

## 2024-10-09 RX ADMIN — ACETAMINOPHEN 325MG 650 MG: 325 TABLET ORAL at 11:11

## 2024-10-09 RX ADMIN — DOCUSATE SODIUM 50 MG: 50 LIQUID ORAL at 21:17

## 2024-10-09 RX ADMIN — PIPERACILLIN SODIUM AND TAZOBACTAM SODIUM 3.38 G: 3; .375 INJECTION, SOLUTION INTRAVENOUS at 05:57

## 2024-10-09 RX ADMIN — CYANOCOBALAMIN TAB 500 MCG 500 MCG: 500 TAB at 09:21

## 2024-10-09 RX ADMIN — IPRATROPIUM BROMIDE AND ALBUTEROL SULFATE 3 ML: 2.5; .5 SOLUTION RESPIRATORY (INHALATION) at 07:32

## 2024-10-09 RX ADMIN — METOPROLOL TARTRATE 25 MG: 25 TABLET, FILM COATED ORAL at 21:16

## 2024-10-09 RX ADMIN — LACOSAMIDE 250 MG: 50 TABLET, FILM COATED ORAL at 21:16

## 2024-10-09 RX ADMIN — AMLODIPINE BESYLATE 7.5 MG: 5 TABLET ORAL at 09:21

## 2024-10-09 RX ADMIN — METOPROLOL TARTRATE 25 MG: 25 TABLET, FILM COATED ORAL at 09:20

## 2024-10-09 RX ADMIN — ACETAMINOPHEN 650 MG: 650 LIQUID ORAL at 23:28

## 2024-10-09 RX ADMIN — HEPARIN SODIUM 5000 UNITS: 5000 INJECTION INTRAVENOUS; SUBCUTANEOUS at 21:17

## 2024-10-09 RX ADMIN — Medication 6 L/MIN: at 21:35

## 2024-10-09 RX ADMIN — PIPERACILLIN SODIUM AND TAZOBACTAM SODIUM 3.38 G: 3; .375 INJECTION, SOLUTION INTRAVENOUS at 11:04

## 2024-10-09 RX ADMIN — DOCUSATE SODIUM 50 MG: 50 LIQUID ORAL at 09:27

## 2024-10-09 ASSESSMENT — COGNITIVE AND FUNCTIONAL STATUS - GENERAL
MOVING FROM LYING ON BACK TO SITTING ON SIDE OF FLAT BED WITH BEDRAILS: TOTAL
PERSONAL GROOMING: TOTAL
MOBILITY SCORE: 6
MOVING FROM LYING ON BACK TO SITTING ON SIDE OF FLAT BED WITH BEDRAILS: TOTAL
HELP NEEDED FOR BATHING: TOTAL
TURNING FROM BACK TO SIDE WHILE IN FLAT BAD: TOTAL
MOBILITY SCORE: 6
CLIMB 3 TO 5 STEPS WITH RAILING: TOTAL
EATING MEALS: TOTAL
CLIMB 3 TO 5 STEPS WITH RAILING: TOTAL
STANDING UP FROM CHAIR USING ARMS: TOTAL
TOILETING: TOTAL
PERSONAL GROOMING: TOTAL
TOILETING: TOTAL
TURNING FROM BACK TO SIDE WHILE IN FLAT BAD: TOTAL
WALKING IN HOSPITAL ROOM: TOTAL
DRESSING REGULAR LOWER BODY CLOTHING: TOTAL
STANDING UP FROM CHAIR USING ARMS: TOTAL
HELP NEEDED FOR BATHING: TOTAL
MOVING TO AND FROM BED TO CHAIR: TOTAL
WALKING IN HOSPITAL ROOM: TOTAL
MOVING TO AND FROM BED TO CHAIR: TOTAL
DRESSING REGULAR UPPER BODY CLOTHING: TOTAL
DAILY ACTIVITIY SCORE: 6
DRESSING REGULAR UPPER BODY CLOTHING: TOTAL
DAILY ACTIVITIY SCORE: 6
DRESSING REGULAR LOWER BODY CLOTHING: TOTAL
EATING MEALS: TOTAL

## 2024-10-09 ASSESSMENT — PAIN SCALES - WONG BAKER
WONGBAKER_NUMERICALRESPONSE: NO HURT
WONGBAKER_NUMERICALRESPONSE: HURTS LITTLE BIT

## 2024-10-09 ASSESSMENT — PAIN DESCRIPTION - ORIENTATION: ORIENTATION: ANTERIOR

## 2024-10-09 ASSESSMENT — PAIN DESCRIPTION - LOCATION: LOCATION: THROAT

## 2024-10-09 NOTE — PROGRESS NOTES
"Vargas Romero is a 29 y.o. male on day 3 of admission presenting with Aspiration pneumonia due to anesthesia during labor and delivery, unspecified laterality, unspecified part of lung (HHS-HCC).      Subjective   Abnormal eye movement observed by mom yesterday. Gaze preference for L side but still able to look around and also to the right. Mom was concerned for stroke but CTH negative.    Mom at bedside this morning. States that pt has been resting well. Baseline mental status and abilities for pt include shaking head yes/no, saying \"stop,\" and slapping/hitting someone if they approach him and try to give him an intervention without telling him first. She suggests telling him that you are doing something before doing it. She is always concerned that he will have a seizure, which leads to aspiration PNA every time. Expresses concern that pts neurologist at [facility] is trying to get pt off Onfi and uptitring on cannabidiol. However pt has now missed 9 doses of cannabidiol, as she cannot bring in an already open bottle. States that [facility] will be shipping her a new bottle tomorrow 10/10. She also notes that she has been watching his HR and has noticed that his HR can go up as high as 130; however, his HR improves with his scheduled metoprolol. She also notes that he has not had a BM for 5 days. Wondering if he would be a candidate for PT/OT. All quesitons answered at bedside.     Pt today makes intermittent eye contact. On HFNC.       Objective     Last Recorded Vitals  BP (!) 157/94 (BP Location: Left arm, Patient Position: Lying)   Pulse 56   Temp 37.4 °C (99.4 °F) (Temporal)   Resp 21   Wt (!) 43.9 kg (96 lb 12.5 oz)   SpO2 95%   Intake/Output last 3 Shifts:    Intake/Output Summary (Last 24 hours) at 10/9/2024 0753  Last data filed at 10/9/2024 0749  Gross per 24 hour   Intake 100 ml   Output --   Net 100 ml       Admission Weight  Weight: (!) 44.5 kg (98 lb) (10/05/24 2218)    Daily Weight  10/06/24 : (!) 43.9 " kg (96 lb 12.5 oz)    Image Results  CT abdomen pelvis w IV contrast  Narrative: Interpreted By:  Rafi Morrell,   STUDY:  CT ABDOMEN PELVIS W IV CONTRAST;  10/8/2024 9:41 pm      INDICATION:  Signs/Symptoms:fullness R side around baclofen pump. 29 y.o. male  with a past medical history of cerebral palsy, spastic quadriplegia,  hypertension, PEG tube, and severe intellectual disability who is s/p  baclofen pump          COMPARISON:  None.      ACCESSION NUMBER(S):  [id number]      ORDERING CLINICIAN:  KENNETH MCADAMS      TECHNIQUE:  Contiguous axial images of the abdomen and pelvis were obtained after  the intravenous administration of iodinated contrast. Coronal and  sagittal reformatted images were reconstructed from the axial data.      FINDINGS:  LOWER CHEST: Compared to CT 10/06/2024 there is significant worsening  of now confluent diffuse consolidation within the entirety of the  visualized right middle lobe and right lower lobe. There is also  worsening of consolidation throughout the lingula and left lower lobe  that is becoming more confluent and diffuse as well with background  ground-glass opacity. The airways are clear. However, there is  visualization of gastroesophageal reflux into the distal esophagus.          ABDOMEN/PELVIS:      ABDOMINAL WALL: There is confluent edema surrounding the baclofen  pump which extends distally to the right groin and months pubis and  right lateral proximal thigh and slightly superiorly to the level of  the mid abdominal wall. There is no evidence of a discrete loculated  fluid collection. There has been resolution of previous abdominal  wall air from recent placement. The catheter wiring is intact without  focal kinking. It enters the spinal canal at L2-L3 and courses  cranially to the lower thoracic spine.      LIVER: There is redemonstration of a lobulated enhancing structure  that is isodense with the portal and hepatic venous blood pool and  demonstrates a portal  vein entering and hepatic vein exiting the area  of enhancement; additionally, the right hepatic vein is more densely  opacified in the left hepatic vein, consistent with a portal  venous/hepatic venous shunt.      BILE DUCTS: No significant intrahepatic or extrahepatic dilatation.      GALLBLADDER: No significant abnormality.      PANCREAS: No significant abnormality.      SPLEEN: No significant abnormality.      ADRENALS: No significant abnormality.      KIDNEYS, URETERS, BLADDER: No significant abnormality.      REPRODUCTIVE ORGANS: No significant abnormality.      VESSELS: Intrahepatic portal venous-hepatic venous shunt as described  above within segment 7.      RETROPERITONEUM/LYMPH NODES: No acute retroperitoneal abnormality. No  enlarged lymph nodes.      BOWEL/MESENTERY/PERITONEUM: There is a percutaneous gastrostomy tube.  The stomach is nondistended. There is a large amount of impacted  stool in the rectum which is distended to 8.2 cm x 5.6 cm similar to  prior study. There is scattered fluid distended small bowel loops in  a nonobstructive pattern. There is also liquid stool throughout the  ascending and transverse colon which is new from prior study.      No ascites, free air, or fluid collection.          MUSCULOSKELETAL: No acute osseous abnormality. No suspicious osseous  lesion. There is redemonstration of dysplastic left hip with  resection of femoral head and neck and irregular deformity of the  proximal femur. The radiolucent tract from intramedullary femoral  nail within the left proximal femur. 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  The bones are osteopenic. There is a broad levoconvex lumbar  scoliosis.      Impression: 1. Confluent edema surrounding the baclofen pump which extends  distally to the right groin and months pubis and right lateral  proximal thigh and slightly superiorly to the level of the mid  abdominal wall. There is no evidence of a discrete loculated fluid  collection. There has been  resolution of previous abdominal wall air  from recent placement. This edema is nonspecific in the immediate  postoperative context and given that it was present immediately after  placement is most likely postoperative in nature. Continued clinical  follow-up recommended to ensure resolution.      2. Compared to CT 10/06/2024 there is significant worsening of now  confluent diffuse consolidation within the entirety of the visualized  right middle lobe and right lower lobe. There is also worsening of  consolidation throughout the lingula and left lower lobe that is  becoming more confluent and diffuse as well with background  ground-glass opacity. There is visualization of gastroesophageal  reflux into the distal esophagus. Differential diagnosis includes  worsening pneumonia, developing diffuse alveolar damage superimposed  upon pneumonia or, call pneumonitis from recent aspiration though  less likely given clear airways.      3. Worsening fluid distention of small and large bowel that could be  related to developing enteritis/colitis.      4. Large amount of impacted stool in the rectum which is distended up  to 8.2 cm similar prior study. Consider disimpaction to avoid  complication of stercoral colitis.      5. Redemonstration of intrahepatic portal venous-hepatic venous shunt  segment 7.      MACRO:  None.      Signed by: Rafi Morrell 10/8/2024 10:12 PM  Dictation workstation:   [id number]  CT head wo IV contrast  Narrative: Interpreted By:  Rafi Morrell,   STUDY:  CT HEAD WO IV CONTRAST;  10/8/2024 9:41 pm      INDICATION:  Signs/Symptoms:abnormal eye movements.          COMPARISON:  10/06/2024      ACCESSION NUMBER(S):  [id number]      ORDERING CLINICIAN:  KENNETH MCADAMS      TECHNIQUE:  Noncontrast axial CT images of head were obtained with coronal and  sagittal reconstructed images.      FINDINGS:  BRAIN PARENCHYMA: There is redemonstration of diffuse age  disproportionate cerebral volume loss with  predominant white matter  atrophy and generalized periventricular subcortical gliosis. No acute  intraparenchymal hemorrhage or parenchymal evidence of acute large  territory ischemic infarct. Gray-white matter distinction is  preserved. No mass-effect.      VENTRICLES and EXTRA-AXIAL SPACES:  No acute extra-axial or  intraventricular hemorrhage. No effacement of cerebral sulci. The  ventricles and sulci are disproportionately prominent for age due to  volume loss.      PARANASAL SINUSES/MASTOIDS: Mild mucosal thickening in the sphenoid  sinuses. No hemorrhage or air-fluid levels within the visualized  paranasal sinuses. The mastoids are well aerated.      CALVARIUM/ORBITS:  No skull fracture.  The orbits and globes are  intact to the extent visualized.      EXTRACRANIAL SOFT TISSUES: No discernible abnormality.      Impression: No acute intracranial abnormality.      Redemonstration of age disproportionate supratentorial volume loss  and white matter gliosis, most likely reflecting diffuse remote  cerebral insult.      MACRO:  None.      Signed by: Rafi Morrell 10/8/2024 10:01 PM  Dictation workstation:   [id number]      Physical Exam  Vitals and nursing note reviewed.   Constitutional:       Appearance: He is ill-appearing.   HENT:      Mouth/Throat:      Mouth: Mucous membranes are moist.      Pharynx: Oropharynx is clear.   Cardiovascular:      Rate and Rhythm: Regular rhythm. Tachycardia present.   Pulmonary: He is on HFNC 10L (down from 15L)     Effort: Pulmonary effort is normal.      Breath sounds: Rhonchi present.   Abdominal:      Palpations: Abdomen is soft.   Neurological:      Comments: Nonverbal     Relevant Results  Scheduled medications  amLODIPine, 7.5 mg, oral, Daily  cannabidiol, 5 mg/kg, oral, BID  cloBAZam, 10 mg, oral, Nightly  cyanocobalamin, 500 mcg, oral, Daily  docusate sodium, 50 mg, oral, BID  ergocalciferol, 1,250 mcg, oral, Weekly  folic acid, 1 mg, oral, Daily  heparin (porcine),  5,000 Units, subcutaneous, q8h POPEYE  ipratropium-albuteroL, 3 mL, nebulization, q6h  lacosamide, 250 mg, oral, BID  levETIRAcetam, 750 mg, oral, BID  metoprolol tartrate, 25 mg, oral, BID  oxygen, , inhalation, Continuous - Inhalation  pantoprazole, 40 mg, oral, Daily before breakfast   Or  pantoprazole, 40 mg, intravenous, Daily before breakfast  piperacillin-tazobactam, 3.375 g, intravenous, q6h  prednisoLONE, 40 mg, oral, Daily  senna, 5 mL, oral, Nightly      Continuous medications     PRN medications  PRN medications: acetaminophen **OR** acetaminophen **OR** acetaminophen, bisacodyl, glycopyrrolate, ipratropium-albuteroL, ondansetron **OR** ondansetron, oxyCODONE    Results for orders placed or performed during the hospital encounter of 10/05/24 (from the past 24 hour(s))   MRSA Surveillance for Vancomycin De-escalation, PCR    Specimen: Anterior Nares; Swab   Result Value Ref Range    MRSA PCR Not Detected Not Detected   Respiratory Culture/Smear    Specimen: SPUTUM; Fluid   Result Value Ref Range    Respiratory Culture/Smear (A)      Culture not performed. See Gram stain findings. Recollect if clinically indicated.    Gram Stain (A)      Gram stain indicates specimen contains significant salivary contamination.    Gram Stain (A)      A specimen of better quality should be submitted if clinically indicated.   CBC and Auto Differential   Result Value Ref Range    WBC 9.8 4.4 - 11.3 x10*3/uL    nRBC 0.0 0.0 - 0.0 /100 WBCs    RBC 4.60 4.50 - 5.90 x10*6/uL    Hemoglobin 12.1 (L) 13.5 - 17.5 g/dL    Hematocrit 38.5 (L) 41.0 - 52.0 %    MCV 84 80 - 100 fL    MCH 26.3 26.0 - 34.0 pg    MCHC 31.4 (L) 32.0 - 36.0 g/dL    RDW 14.0 11.5 - 14.5 %    Platelets 160 150 - 450 x10*3/uL    Neutrophils % 79.2 40.0 - 80.0 %    Immature Granulocytes %, Automated 0.6 0.0 - 0.9 %    Lymphocytes % 10.6 13.0 - 44.0 %    Monocytes % 9.3 2.0 - 10.0 %    Eosinophils % 0.1 0.0 - 6.0 %    Basophils % 0.2 0.0 - 2.0 %    Neutrophils  Absolute 7.73 (H) 1.20 - 7.70 x10*3/uL    Immature Granulocytes Absolute, Automated 0.06 0.00 - 0.70 x10*3/uL    Lymphocytes Absolute 1.04 (L) 1.20 - 4.80 x10*3/uL    Monocytes Absolute 0.91 0.10 - 1.00 x10*3/uL    Eosinophils Absolute 0.01 0.00 - 0.70 x10*3/uL    Basophils Absolute 0.02 0.00 - 0.10 x10*3/uL   Renal Function Panel   Result Value Ref Range    Glucose 195 (H) 74 - 99 mg/dL    Sodium 140 136 - 145 mmol/L    Potassium 3.4 (L) 3.5 - 5.3 mmol/L    Chloride 100 98 - 107 mmol/L    Bicarbonate 33 (H) 21 - 32 mmol/L    Anion Gap 10 10 - 20 mmol/L    Urea Nitrogen 11 6 - 23 mg/dL    Creatinine 0.49 (L) 0.50 - 1.30 mg/dL    eGFR >90 >60 mL/min/1.73m*2    Calcium 8.9 8.6 - 10.3 mg/dL    Phosphorus 1.0 (L) 2.5 - 4.9 mg/dL    Albumin 3.2 (L) 3.4 - 5.0 g/dL            Assessment/Plan      Update 10/9:  -Pt currently 10L HF (down from 15L)  -c/w IV zosyn, c/w steroids per pulm  -Procal in the AM pending  -Discussed case with Dr. Srivastava, neurology, who will see pt tonight. Recommends MRI brain for abnormal eye movements, though it is not uncommon to have wandering, disconjugate eye movements in cerebral palsy  >>Dr. Srivastava to discuss with mom the lack of cannabidiol for the past 9 doses  -K/phos repleted. Discussed dosing with pharmacy.  -Enema ordered for no BM x 5 days  -PT/OT ordered  -CXR ordered for tomrw AM    Acute Hypoxic Respiratory Failure  Sepsis due to aspiration pneumonia  - lactate elevation resolved. Urine antigens negative thus azithromycin stopped. MRSA PCR negative. Sputum cultures ordered (noted contamination), Blood cultures ngtd. Continue IV Zosyn. Start steroids per pulm recs  -CT a/p done overnight 10/9; showing nonspecific edema which is likely postop. Significant worsening of diffuse consolidation in RML, RLL  - Pulm and RT consulted; will need aggressive pulmonary hygiene, vaishali nebs. NT Suctioning bid, Vest therapy.   - wean O2 for goal o2 sat 88-92%     Abnormal eye movements  -Nocturnist  called as pt was looking toward his left often, mom concerned for stroke  -CT head negative  -Neuro consulted  -Recommend MRI brain, pending    Sinus tachycardia  - due to above; treat underlying issues not HR  -500ml fluid bolus on 10/9     Cerebral palsy with spastic quadriplegia  Seizure disorder  - continue cannabidiol, onfi, vimpat, keppra  - admitted after baclofen pump placement     GERD  - continue ppi     HTN  - continue norvasc, metoprolol           DVT Prophylaxis: subcutaneous Heparin     Disposition: continue to monitor inpatient, await consultant recommendations, await test results, and await clinical improvement       Genoveva Gann MD

## 2024-10-09 NOTE — PROGRESS NOTES
Vargas Romero is a 29 y.o. male on day 3 of admission presenting with Aspiration pneumonia due to anesthesia during labor and delivery, unspecified laterality, unspecified part of lung (HHS-HCC).    Subjective   Patient seen and examined with mom at bedside.  Mom reports overall she feels he is doing better.  His work of breathing is much more easy today.  He is currently on 10 L nasal cannula satting 98%, weaned to 8 L for approximately 10 minutes satting 97% further weaned to 7 L and maintaining at 96%. He is again febrile and tachy.  He is more responsive to stimulation and demonstrates an awareness of people and procedures.  Mom reports she is continuing to suction off-white thick secretions.      Objective     Physical Exam    Constitutional:   Thin, chronically ill, increased work of breathing without acute distress  HENT: Atraumatic, moist mucous membranes  Eyes: Nonicteric  Neck: Supple  Cardiovascular: S1, S2 normal, tachycardic, HR 100s, no murmur appreciated  Pulmonary: Poor to fair air entry with moderate bibasilar crackles and mild coarse breath sounds in bilateral upper fields, overall improved breathing today; weak cough   Abdominal: semi-soft, non distended, + BS  Musculoskeletal: Permanent contractures; diffuse muscle wasting  Extremities: No BLE edema  Lymphadenopathy: No nuchal LAP  Skin: Warm, semimoist  Neurological: Awake, nonverbal, unable to follow commands; more responsive to people and situations today    Medications:  amLODIPine, 7.5 mg, oral, Daily  cannabidiol, 5 mg/kg, oral, BID  cloBAZam, 10 mg, oral, Nightly  cyanocobalamin, 500 mcg, oral, Daily  docusate sodium, 50 mg, oral, BID  ergocalciferol, 1,250 mcg, oral, Weekly  folic acid, 1 mg, oral, Daily  heparin (porcine), 5,000 Units, subcutaneous, q8h POPEYE  ipratropium-albuteroL, 3 mL, nebulization, q6h  lacosamide, 250 mg, oral, BID  levETIRAcetam, 750 mg, oral, BID  metoprolol tartrate, 25 mg, oral, BID  oxygen, , inhalation,  "Continuous - Inhalation  pantoprazole, 40 mg, oral, Daily before breakfast   Or  pantoprazole, 40 mg, intravenous, Daily before breakfast  piperacillin-tazobactam, 3.375 g, intravenous, q6h  prednisoLONE, 40 mg, oral, Daily  senna, 5 mL, oral, Nightly    PRN medications: acetaminophen **OR** acetaminophen **OR** acetaminophen, bisacodyl, glycopyrrolate, ipratropium-albuteroL, ondansetron **OR** ondansetron, oxyCODONE      Last Recorded Vitals  Blood pressure (!) 157/94, pulse 56, temperature 37.4 °C (99.4 °F), temperature source Temporal, resp. rate 21, height 1.6 m (5' 2.99\"), weight (!) 43.9 kg (96 lb 12.5 oz), SpO2 95%.  Intake/Output last 3 Shifts:  I/O last 3 completed shifts:  In: 600 (13.7 mL/kg) [IV Piggyback:600]  Out: - (0 mL/kg)   Weight: 43.9 kg     Relevant Results  Results for orders placed or performed during the hospital encounter of 10/05/24 (from the past 24 hour(s))   CBC and Auto Differential   Result Value Ref Range    WBC 9.8 4.4 - 11.3 x10*3/uL    nRBC 0.0 0.0 - 0.0 /100 WBCs    RBC 4.60 4.50 - 5.90 x10*6/uL    Hemoglobin 12.1 (L) 13.5 - 17.5 g/dL    Hematocrit 38.5 (L) 41.0 - 52.0 %    MCV 84 80 - 100 fL    MCH 26.3 26.0 - 34.0 pg    MCHC 31.4 (L) 32.0 - 36.0 g/dL    RDW 14.0 11.5 - 14.5 %    Platelets 160 150 - 450 x10*3/uL    Neutrophils % 79.2 40.0 - 80.0 %    Immature Granulocytes %, Automated 0.6 0.0 - 0.9 %    Lymphocytes % 10.6 13.0 - 44.0 %    Monocytes % 9.3 2.0 - 10.0 %    Eosinophils % 0.1 0.0 - 6.0 %    Basophils % 0.2 0.0 - 2.0 %    Neutrophils Absolute 7.73 (H) 1.20 - 7.70 x10*3/uL    Immature Granulocytes Absolute, Automated 0.06 0.00 - 0.70 x10*3/uL    Lymphocytes Absolute 1.04 (L) 1.20 - 4.80 x10*3/uL    Monocytes Absolute 0.91 0.10 - 1.00 x10*3/uL    Eosinophils Absolute 0.01 0.00 - 0.70 x10*3/uL    Basophils Absolute 0.02 0.00 - 0.10 x10*3/uL   Renal Function Panel   Result Value Ref Range    Glucose 195 (H) 74 - 99 mg/dL    Sodium 140 136 - 145 mmol/L    Potassium 3.4 (L) " 3.5 - 5.3 mmol/L    Chloride 100 98 - 107 mmol/L    Bicarbonate 33 (H) 21 - 32 mmol/L    Anion Gap 10 10 - 20 mmol/L    Urea Nitrogen 11 6 - 23 mg/dL    Creatinine 0.49 (L) 0.50 - 1.30 mg/dL    eGFR >90 >60 mL/min/1.73m*2    Calcium 8.9 8.6 - 10.3 mg/dL    Phosphorus 1.0 (L) 2.5 - 4.9 mg/dL    Albumin 3.2 (L) 3.4 - 5.0 g/dL      XR chest 1 view  Result Date: 10/6/2024  Interpreted By:  Anthony Silva, STUDY: XR CHEST 1 VIEW;  10/6/2024 6:39 pm   INDICATION: Signs/Symptoms:hypoxia.     COMPARISON: 10/05/2024   ACCESSION NUMBER(S): [id number]   ORDERING CLINICIAN: KENNETH MCADAMS   FINDINGS:   There is multifocal airspace disease in the lungs predominantly at the right lung base, worsened from the prior. Multifocal pneumonia versus aspiration pneumonitis in the differential. No large effusion seen. No pneumothorax. The cardiac silhouette is within normal limits for size.       1. Extensive bilateral multifocal airspace disease worse in the right lung base. Underlying pneumonia and aspiration pneumonitis in the differential       MACRO: None   Signed by: Anthony Silva 10/6/2024 6:42 PM Dictation workstation:   [id number]    CT angio chest for pulmonary embolism  Result Date: 10/6/2024  Interpreted By:  Timothy Quinn, STUDY: CT ANGIO CHEST FOR PULMONARY EMBOLISM; CT ABDOMEN PELVIS W IV CONTRAST;  10/6/2024 12:37 am   INDICATION: Signs/Symptoms:ams, hypoxia; Signs/Symptoms:recent baclofen pump placement, ams   COMPARISON: None.   ACCESSION NUMBER(S): [id number]; [id number]   ORDERING CLINICIAN: LAYA LA   TECHNIQUE: CT of the chest, abdomen, and pelvis was performed. Contiguous axial images were obtained through the chest, abdomen and pelvis. Coronal and sagittal reconstructions were performed. Intravenous contrast was administered, 75 mL Omnipaque 350. Chest images were acquired in the pulmonary angiographic phase. MIP/3D reconstructions were performed on a separate workstation and provided for  interpretation.   FINDINGS: CHEST:   LOWER NECK AND CHEST WALL:  Within normal limits.   MEDIASTINUM/ATVARES:No lymphadenopathy. Esophagus is unremarkable.   CARDIOVASCULAR:  Cardiac chamber size within normal limits. No pericardial effusion.  Aortic caliber normal. Normal caliber of main pulmonary artery. No pulmonary embolism. Motion artifact limits evaluation of subsegmental pulmonary arteries.   LUNGS, AIRWAYS, AND PLEURA:  Patchy right-greater-than-left lower lobe consolidative and ground-glass opacities. Debris within the dependent trachea.   MUSCULOSKELETAL: No acute osseous abnormality or suspicious osseous lesions.  Intraspinal medication catheter in place.       ABDOMEN:   LIVER: Within normal limits.   BILE DUCTS: Normal caliber.   GALLBLADDER: No calcified stones. No wall thickening.   PANCREAS: Within normal limits.   SPLEEN: Within normal limits.   ADRENALS: Within normal limits.   KIDNEYS, URETERS, and BLADDER: Early contrast excretion limits evaluation for small renal calculi. No hydronephrosis.  Ureters are non-dilated. Urinary bladder within normal limits.   REPRODUCTIVE: No pelvic masses.   VESSELS: Aorta and IVC appear normal.   RETROPERITONEUM and LYMPH NODES: No lymphadenopathy.   BOWEL: Gastrostomy tube in place. Small bowel is non-dilated. Normal appendix. Large bowel is normal.   PERITONEUM: No ascites or free air. No fluid collection.   BODY WALL: Right lower quadrant subcutaneous medication pump in place; metallic streak artifact limits evaluation of adjacent structures. There is soft tissue edema and soft tissue gas surrounding the pump compatible with recent placement. No distinct organized fluid collection is identified.   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 Thoracolumbar dextrocurvature. No acute osseous abnormality.         1. Patchy right-greater-than-left lower lobe consolidative and ground-glass  opacities compatible with pneumonia. Debris within the dependent trachea raising concern for aspiration. 2. No pulmonary embolism. Motion artifact limits evaluation of subsegmental pulmonary arteries. 3. Right lower quadrant subcutaneous medication pump in place; metallic streak artifact limits evaluation of adjacent structures. There is soft tissue edema and soft tissue gas surrounding the pump compatible with recent placement. No distinct organized fluid collection is identified.     Signed by: Timothy Quinn 10/6/2024 1:27 AM Dictation workstation:   [id number]    XR chest 1 view  Result Date: 10/6/2024  Interpreted By:  Felix Devlin, STUDY: XR CHEST 1 VIEW;  10/5/2024 11:25 pm   INDICATION: Signs/Symptoms:hypoxia.   COMPARISON: None.   ACCESSION NUMBER(S): [id number]   ORDERING CLINICIAN: LYAA LA   FINDINGS:     CARDIOMEDIASTINAL SILHOUETTE: Rightward mediastinal shift.   LUNGS: The right lung is asymmetrically lucent compared to the left. No definite pleural effusion or pneumothorax.   ABDOMEN: Gas in the left upper quadrant, possibly in the stomach, but extraluminal air is not excluded.   BONES: No acute osseous abnormality.       The right lung is asymmetrically lucent compared to the left. This finding is nonspecific and may represent congenital lobar emphysema, trapped air secondary to endobronchial obstruction, or left-sided airspace disease. Recommend chest CT to better evaluate.   Prominent gas is present in the left upper quadrant, possibly in the colon and stomach but extraluminal air is not excluded. This can also be evaluated on the above recommended chest CT.   MACRO: None   Signed by: Felix Devlin 10/6/2024 12:13 AM Dictation workstation:   [id number]      Assessment/Plan     29 y.o. male admitted on 10/5/2024 10:15 PM for acute hypoxic respiratory failure. He has a past history significant for cerebral palsy with spastic quadriplegia, PEG tube. Baclofen pump placement on  10/4/2024.  At baseline he is very interactive and enjoys singing; somnolent on admission. Usual state of health afterward with wet cough,copious secretions and mental decline beginning 10/5.  Baseline, no supplemental oxygen needs, however, upon ER triage he was hypoxic and was started on 6 L/min.  Labs on admission were notable for WBC count of 5.3, BNP of 24, negative SARS-CoV-2, influenza testing.  Imaging notable for bibasilar consolidations and debris within the trachea on CT.  Pulmonary embolism was ruled out.. He is currently receiving pip-tazo, azithromycin and also received a dose of gentamicin in the emergency room.    Assessment & Plan  Aspiration pneumonia due to anesthesia during labor and delivery, unspecified laterality, unspecified part of lung (HHS-HCC)  Most likely related to inability to maintain airway post-procedurally (baclofen pump on 10/4/24); CT with bibasilar opacities, right greater than left; Pro-Stalin 4.69; febrile and tachycardic    #Acute hypoxic respiratory failure: 2/2 aspiration pna, deconditioning; baseline is room air, required 6-15L, O2 needs are fluctuating, currently on 7L NC    Recommendations:  -Continue present antibiotics: zosyn   -Titrate O2 to maintain SpO2 90 to 92%.  Currently on 7 L/min, Pox 93-94%   -Continue DuoNebs every 6  -Bronchopulmonary hygiene with mechanical vest; NT suctioning 2x/day; frequent oral suctioning  -Continues to be febrile, Tmax 37.4, tachypneic and tachycardic; starting prednisolone 40 mg daily on 10/8  -Send sputum for culture if able - contaminated  -Strep and Legionella antigens both negative  -MRSA negative  -Follow-up blood cultures (NGTD)    I spent 35 minutes in the professional and overall care of this patient.   LIBERTAD Abdul-CNS

## 2024-10-09 NOTE — CARE PLAN
The patient's goals for the shift include      The clinical goals for the shift include pt will remain hds     Over the shift, the patient is making progress toward the following goals.       Problem: Fall/Injury  Goal: Not fall by end of shift  Outcome: Progressing     Problem: Skin  Goal: Prevent/manage excess moisture  Outcome: Progressing     Problem: Pain  Goal: Free from opioid side effects throughout the shift  Outcome: Progressing

## 2024-10-09 NOTE — CONSULTS
Consults  Reason for Consult:  Evaluation and management of pneumonia    Patient is seen at the request of Dr. Sandrita Fair    Subjective   History of Present Illness:  Vargas Romero is a 29 y.o. male who presented on 10/5/2024 with lethargy and shortness of breath.  He underwent placement of a baclofen pump on 10/4/2024 and then clinically decompensated.  He arrived with a temperature of 36.7 and was hypoxic requiring 6 L/min nasal cannula oxygen.  He had a chest x-ray which showed infiltrates.  This was followed by a CT scan of the chest abdomen pelvis that showed right greater than left infiltrates with debris in the trachea consistent with aspiration.  He got 1 dose of gentamicin and was placed initially on Zosyn and azithromycin.  The azithromycin was stopped when his urine Legionella antigen was found to be negative.  He has remained on Zosyn and been followed by the pulmonary service.  He went for another CT scan on 10/8/2024 that showed worsening of the right lung infiltrate.  He continued to have fevers until yesterday morning.  He got a dose of vancomycin yesterday.  Nursing reports that throughout the day today his oxygen needs have gone down from 15 L to 7 L.  He has no vomiting or diarrhea    Past Medical History:   has a past medical history of Allergic rhinitis, Dysphagia, Essential hypertension, GERD (gastroesophageal reflux disease), Intractable seizure disorder (Multi), Malnutrition (Multi), PEG (percutaneous endoscopic gastrostomy) status (Multi), Seasonal allergies, and Severe intellectual disability.    has a past surgical history that includes Other surgical history; Other surgical history; Other surgical history; and Other surgical history.     Social History:   reports that he has never smoked. He has never used smokeless tobacco. He reports that he does not drink alcohol and does not use drugs.     Family History:  No sick contacts    Review of Systems:  Fevers, shortness of  breath    Allergies:  Patient has no known allergies.      Objective   Current Facility-Administered Medications   Medication Dose Route Frequency Provider Last Rate Last Admin    acetaminophen (Tylenol) tablet 650 mg  650 mg oral q4h PRN Marcos G Salomone, DO   650 mg at 10/09/24 1111    Or    acetaminophen (Tylenol) suspension 650 mg  650 mg oral q4h PRN Marcos G Salomone, DO        Or    acetaminophen (Tylenol) suppository 650 mg  650 mg rectal q4h PRN Marcos G Salomone, DO   650 mg at 10/06/24 2046    amLODIPine (Norvasc) tablet 7.5 mg  7.5 mg oral Daily Marcos G Salomone, DO   7.5 mg at 10/09/24 0921    bisacodyl (Dulcolax) EC tablet 5 mg  5 mg oral Daily PRN Marcos G Salomone, DO        cannabidiol (Epidiolex) solution 220 mg--PATIENT OWN MEDICATION  5 mg/kg oral BID Tati White MD        cloBAZam (Onfi) tablet 10 mg  10 mg oral Nightly Marcos G Salomone, DO   10 mg at 10/08/24 2204    cyanocobalamin (Vitamin B-12) tablet 500 mcg  500 mcg oral Daily Marcos G Salomone, DO   500 mcg at 10/09/24 0921    docusate sodium (Colace) oral liquid 50 mg  50 mg oral BID Marcos G Salomone, DO   50 mg at 10/09/24 0927    ergocalciferol (Vitamin D-2) capsule 1,250 mcg  1,250 mcg oral Weekly Marcos G Salomone, DO   1,250 mcg at 10/06/24 1105    folic acid (Folvite) tablet 1 mg  1 mg oral Daily Marcos  Salomone, DO   1 mg at 10/09/24 0921    glycopyrrolate PF (Robinul) 0.2 mg/mL injection 0.2 mg  0.2 mg intravenous q4h PRN Tati White MD   0.2 mg at 10/09/24 1111    heparin (porcine) injection 5,000 Units  5,000 Units subcutaneous q8h [facility] Marcos G Salomone, DO   5,000 Units at 10/09/24 0558    ipratropium-albuteroL (Duo-Neb) 0.5-2.5 mg/3 mL nebulizer solution 3 mL  3 mL nebulization q6h Marcos  Salomone, DO   3 mL at 10/09/24 1348    ipratropium-albuteroL (Duo-Neb) 0.5-2.5 mg/3 mL nebulizer solution 3 mL  3 mL nebulization q2h PRN Marcos G Salomone, DO        lacosamide (Vimpat) tablet 250 mg  250 mg oral BID Deep Coronel, DO   250 mg at  10/09/24 0921    lactated Ringer's bolus 500 mL  500 mL intravenous Once Genoveva Gann MD        levETIRAcetam (Keppra) tablet 750 mg  750 mg oral BID Deep Coronel, DO   750 mg at 10/09/24 0920    metoprolol tartrate (Lopressor) tablet 25 mg  25 mg oral BID Marcos G Salomone, DO   25 mg at 10/09/24 0920    ondansetron (Zofran) tablet 4 mg  4 mg oral q8h PRN Marcos G Salomone, DO        Or    ondansetron (Zofran) injection 4 mg  4 mg intravenous q8h PRN Marcos G Salomone, DO        oxyCODONE (Roxicodone) immediate release tablet 5 mg  5 mg oral q6h PRN Marcos G Salomone, DO        oxygen (O2) therapy   inhalation Continuous - Inhalation Deep Coronel, DO   10 L/min at 10/09/24 0732    pantoprazole (ProtoNix) EC tablet 40 mg  40 mg oral Daily before breakfast Marcos G Salomone, DO   40 mg at 10/07/24 0600    Or    pantoprazole (ProtoNix) injection 40 mg  40 mg intravenous Daily before breakfast Marcos G Salomone, DO   40 mg at 10/09/24 0558    piperacillin-tazobactam (Zosyn) 3.375 g in dextrose (iso) IV 50 mL  3.375 g intravenous q6h Marcos G Salomone, DO   Stopped at 10/09/24 1155    potassium phosphates 21 mmol in dextrose 5% 250 mL IV  21 mmol intravenous Once Genoveva Gann MD        prednisoLONE sodium phosphate (OrapRED) oral solution 40 mg  40 mg oral Daily LIBERTAD Abdul-CNS   40 mg at 10/09/24 0928    senna (Senokot) 8.8 mg/5 mL syrup 5 mL  5 mL oral Nightly Marcos G Salomone, DO   5 mL at 10/08/24 2203       Physical Exam:  BP (!) 157/94 (BP Location: Left arm, Patient Position: Lying)   Pulse 56   Temp 37.4 °C (99.4 °F) (Temporal)   Resp 21   Wt (!) 43.9 kg (96 lb 12.5 oz)   SpO2 95%    General: no acute distress, lying in bed, awake but tired appearing  HEENT: Wearing nasal cannula oxygen  CVS: RRR  Resp: decreased breath sounds in bases, coarse breath sounds bilaterally  ABD: soft, NT, ND, PEG  : External urinary catheter in place  EXT: Thin and contracted  Skin: Right lower abdominal surgical site is closed  without erythema    Relevant Results:    Labs:  Results from last 72 hours   Lab Units 10/09/24  0627 10/08/24  0524 10/07/24  0532   SODIUM mmol/L 140 136 137   POTASSIUM mmol/L 3.4* 3.5 5.0   CHLORIDE mmol/L 100 100 105   BUN mg/dL 11 8 7   CREATININE mg/dL 0.49* 0.57 0.58   PHOSPHORUS mg/dL 1.0* 1.2* 1.6*     Results from last 72 hours   Lab Units 10/09/24  0627 10/08/24  0524 10/07/24  1133   WBC AUTO x10*3/uL 9.8 9.4 8.5   HEMOGLOBIN g/dL 12.1* 12.8* 13.6   HEMATOCRIT % 38.5* 39.1* 42.2   PLATELETS AUTO x10*3/uL 160 157 159     10/5/2024 lactate 2.4  10/5/2024 ABG reviewed  10/5/2024 urinalysis is benign  10/6/2024 procalcitonin 4.69    Microbiology data: I have personally and independently reviewed and intrepreted the lab results  10/5/2024 blood culture show no growth  10/5/2024 COVID-19 test negative; influenza swab negative  10/6/2024 urine Legionella antigen negative; urine pneumococcal antigen negative  10/8/2024 MRSA swab was negative  10/8/2024 sputum culture was contaminated with saliva    Imaging data: I have personally and independently reviewed and interpreted the imaging studies  10/5/2024 chest x-ray shows lung infiltrates  10/5/2024 head CT shows no acute issues  10/5/2024 CT chest abdomen pelvis shows right more than left infiltrates with debris in the trachea; there is soft tissue gas around the newly inserted pump  10/6/2024 chest x-ray shows bilateral infiltrates  10/8/2024 CT head shows no acute issues  10/8/2024 CT abdomen pelvis shows postoperative fluid at the baclofen pump; there is increased and more confluent right middle lobe and right lower lobe infiltrate; there is fluid distention of the bowel with constipation     Assessment/Plan     MY IMPRESSION & RECOMMENDATIONS:  Severe sepsis, likely due to aspiration pneumonia. I have personally and independently reviewed and interpreted the laboratory tests, imaging studies, and the documentation from other healthcare providers.  Imaging  shows an extensive right lower lobe infiltrate, in particular.  I suspect that this was a relatively large aspiration event and it is taking him some time to stabilize from it.  His systemic response has lasted for a couple of days.  He has been having high fevers and he may finally be defervescing.  He has acute hypoxic respiratory failure and it seems that his oxygen requirements may be starting to come down.  I am going to leave him on the Zosyn for now.  I will monitor for side effects from Zosyn which can include rash, diarrhea, and bone marrow suppression.  If there is any concern moving forward for clinical decompensation, I will have a low threshold to add vancomycin back and start Levaquin.  His prognosis is uncertain due to his tenuous respiratory status.    -Continue Zosyn for now  -Monitor fever curve    Other issues:  #Acute metabolic encephalopathy  #Cerebral palsy with spastic quadriplegia status post PEG and baclofen pump  #Seizure disorder  #GERD  #Hypertension       Micah Padilla MD

## 2024-10-09 NOTE — ASSESSMENT & PLAN NOTE
Most likely related to inability to maintain airway post-procedurally (baclofen pump on 10/4/24); CT with bibasilar opacities, right greater than left; Pro-Stalin 4.69; febrile and tachycardic    #Acute hypoxic respiratory failure: 2/2 aspiration pna, deconditioning; baseline is room air, required 6-15L, O2 needs are fluctuating, currently on 7L NC

## 2024-10-09 NOTE — PROGRESS NOTES
10/09/24 0831   Discharge Planning   Expected Discharge Disposition Home     Once med ready plan would be to discharge home with patient's mother, she is the primary caregiver, ID following consult to Neuro placed for abnormal eye movements, patient has Jevity 1.5 running at 40ml/hr. On IV vanc and zosyn, Pulm following for aspiration Pna, wean 02, vest therapy and suctioning. I will continue to monitor for discharge planning ADOD 48-72hrs.

## 2024-10-10 ENCOUNTER — APPOINTMENT (OUTPATIENT)
Dept: RADIOLOGY | Facility: HOSPITAL | Age: 29
End: 2024-10-10
Payer: MEDICAID

## 2024-10-10 LAB
ALBUMIN SERPL BCP-MCNC: 3 G/DL (ref 3.4–5)
ANION GAP SERPL CALC-SCNC: 10 MMOL/L (ref 10–20)
BACTERIA BLD CULT: NORMAL
BACTERIA BLD CULT: NORMAL
BASOPHILS # BLD AUTO: 0.02 X10*3/UL (ref 0–0.1)
BASOPHILS NFR BLD AUTO: 0.3 %
BUN SERPL-MCNC: 11 MG/DL (ref 6–23)
CALCIUM SERPL-MCNC: 8.6 MG/DL (ref 8.6–10.3)
CHLORIDE SERPL-SCNC: 99 MMOL/L (ref 98–107)
CO2 SERPL-SCNC: 34 MMOL/L (ref 21–32)
CREAT SERPL-MCNC: 0.46 MG/DL (ref 0.5–1.3)
EGFRCR SERPLBLD CKD-EPI 2021: >90 ML/MIN/1.73M*2
EOSINOPHIL # BLD AUTO: 0.1 X10*3/UL (ref 0–0.7)
EOSINOPHIL NFR BLD AUTO: 1.3 %
ERYTHROCYTE [DISTWIDTH] IN BLOOD BY AUTOMATED COUNT: 14.1 % (ref 11.5–14.5)
GLUCOSE BLD MANUAL STRIP-MCNC: 190 MG/DL (ref 74–99)
GLUCOSE SERPL-MCNC: 163 MG/DL (ref 74–99)
HCT VFR BLD AUTO: 34 % (ref 41–52)
HGB BLD-MCNC: 10.7 G/DL (ref 13.5–17.5)
IMM GRANULOCYTES # BLD AUTO: 0.08 X10*3/UL (ref 0–0.7)
IMM GRANULOCYTES NFR BLD AUTO: 1.1 % (ref 0–0.9)
LYMPHOCYTES # BLD AUTO: 2.11 X10*3/UL (ref 1.2–4.8)
LYMPHOCYTES NFR BLD AUTO: 27.9 %
MAGNESIUM SERPL-MCNC: 2.1 MG/DL (ref 1.6–2.4)
MCH RBC QN AUTO: 25.9 PG (ref 26–34)
MCHC RBC AUTO-ENTMCNC: 31.5 G/DL (ref 32–36)
MCV RBC AUTO: 82 FL (ref 80–100)
MONOCYTES # BLD AUTO: 1.21 X10*3/UL (ref 0.1–1)
MONOCYTES NFR BLD AUTO: 16 %
NEUTROPHILS # BLD AUTO: 4.03 X10*3/UL (ref 1.2–7.7)
NEUTROPHILS NFR BLD AUTO: 53.4 %
NRBC BLD-RTO: 0 /100 WBCS (ref 0–0)
PHOSPHATE SERPL-MCNC: 2.1 MG/DL (ref 2.5–4.9)
PLATELET # BLD AUTO: 181 X10*3/UL (ref 150–450)
POTASSIUM SERPL-SCNC: 3.3 MMOL/L (ref 3.5–5.3)
PROCALCITONIN SERPL-MCNC: 1.59 NG/ML
RBC # BLD AUTO: 4.13 X10*6/UL (ref 4.5–5.9)
SODIUM SERPL-SCNC: 140 MMOL/L (ref 136–145)
WBC # BLD AUTO: 7.6 X10*3/UL (ref 4.4–11.3)

## 2024-10-10 PROCEDURE — 84145 PROCALCITONIN (PCT): CPT | Mod: AHULAB | Performed by: STUDENT IN AN ORGANIZED HEALTH CARE EDUCATION/TRAINING PROGRAM

## 2024-10-10 PROCEDURE — 71045 X-RAY EXAM CHEST 1 VIEW: CPT

## 2024-10-10 PROCEDURE — 2500000005 HC RX 250 GENERAL PHARMACY W/O HCPCS: Performed by: HOSPITALIST

## 2024-10-10 PROCEDURE — 83735 ASSAY OF MAGNESIUM: CPT | Performed by: STUDENT IN AN ORGANIZED HEALTH CARE EDUCATION/TRAINING PROGRAM

## 2024-10-10 PROCEDURE — 94681 O2 UPTK CO2 OUTP % O2 XTRC: CPT

## 2024-10-10 PROCEDURE — 2500000002 HC RX 250 W HCPCS SELF ADMINISTERED DRUGS (ALT 637 FOR MEDICARE OP, ALT 636 FOR OP/ED): Performed by: INTERNAL MEDICINE

## 2024-10-10 PROCEDURE — 94669 MECHANICAL CHEST WALL OSCILL: CPT

## 2024-10-10 PROCEDURE — 70553 MRI BRAIN STEM W/O & W/DYE: CPT

## 2024-10-10 PROCEDURE — 85025 COMPLETE CBC W/AUTO DIFF WBC: CPT | Performed by: HOSPITALIST

## 2024-10-10 PROCEDURE — 2500000001 HC RX 250 WO HCPCS SELF ADMINISTERED DRUGS (ALT 637 FOR MEDICARE OP): Performed by: INTERNAL MEDICINE

## 2024-10-10 PROCEDURE — 80069 RENAL FUNCTION PANEL: CPT | Performed by: HOSPITALIST

## 2024-10-10 PROCEDURE — 82947 ASSAY GLUCOSE BLOOD QUANT: CPT

## 2024-10-10 PROCEDURE — 70553 MRI BRAIN STEM W/O & W/DYE: CPT | Performed by: RADIOLOGY

## 2024-10-10 PROCEDURE — 2500000001 HC RX 250 WO HCPCS SELF ADMINISTERED DRUGS (ALT 637 FOR MEDICARE OP): Performed by: HOSPITALIST

## 2024-10-10 PROCEDURE — 99232 SBSQ HOSP IP/OBS MODERATE 35: CPT | Performed by: CLINICAL NURSE SPECIALIST

## 2024-10-10 PROCEDURE — 2500000004 HC RX 250 GENERAL PHARMACY W/ HCPCS (ALT 636 FOR OP/ED): Performed by: NURSE PRACTITIONER

## 2024-10-10 PROCEDURE — 2500000001 HC RX 250 WO HCPCS SELF ADMINISTERED DRUGS (ALT 637 FOR MEDICARE OP)

## 2024-10-10 PROCEDURE — 94640 AIRWAY INHALATION TREATMENT: CPT

## 2024-10-10 PROCEDURE — 94668 MNPJ CHEST WALL SBSQ: CPT

## 2024-10-10 PROCEDURE — 71045 X-RAY EXAM CHEST 1 VIEW: CPT | Performed by: RADIOLOGY

## 2024-10-10 PROCEDURE — A9575 INJ GADOTERATE MEGLUMI 0.1ML: HCPCS | Performed by: STUDENT IN AN ORGANIZED HEALTH CARE EDUCATION/TRAINING PROGRAM

## 2024-10-10 PROCEDURE — 2500000004 HC RX 250 GENERAL PHARMACY W/ HCPCS (ALT 636 FOR OP/ED): Performed by: INTERNAL MEDICINE

## 2024-10-10 PROCEDURE — 36415 COLL VENOUS BLD VENIPUNCTURE: CPT | Performed by: HOSPITALIST

## 2024-10-10 PROCEDURE — 2550000001 HC RX 255 CONTRASTS: Performed by: STUDENT IN AN ORGANIZED HEALTH CARE EDUCATION/TRAINING PROGRAM

## 2024-10-10 PROCEDURE — 1200000002 HC GENERAL ROOM WITH TELEMETRY DAILY

## 2024-10-10 PROCEDURE — 2500000004 HC RX 250 GENERAL PHARMACY W/ HCPCS (ALT 636 FOR OP/ED): Performed by: CLINICAL NURSE SPECIALIST

## 2024-10-10 PROCEDURE — 2500000001 HC RX 250 WO HCPCS SELF ADMINISTERED DRUGS (ALT 637 FOR MEDICARE OP): Performed by: NURSE PRACTITIONER

## 2024-10-10 RX ORDER — POTASSIUM CHLORIDE 1.5 G/1.58G
20 POWDER, FOR SOLUTION ORAL ONCE
Status: COMPLETED | OUTPATIENT
Start: 2024-10-10 | End: 2024-10-10

## 2024-10-10 RX ORDER — PREDNISOLONE SODIUM PHOSPHATE 15 MG/5ML
40 SOLUTION ORAL 2 TIMES DAILY
Status: DISCONTINUED | OUTPATIENT
Start: 2024-10-10 | End: 2024-10-13

## 2024-10-10 RX ORDER — POLYETHYLENE GLYCOL 3350 17 G/17G
17 POWDER, FOR SOLUTION ORAL DAILY
Status: DISCONTINUED | OUTPATIENT
Start: 2024-10-10 | End: 2024-10-13

## 2024-10-10 RX ORDER — GADOTERATE MEGLUMINE 376.9 MG/ML
8 INJECTION INTRAVENOUS
Status: COMPLETED | OUTPATIENT
Start: 2024-10-10 | End: 2024-10-10

## 2024-10-10 RX ORDER — FUROSEMIDE 10 MG/ML
20 INJECTION INTRAMUSCULAR; INTRAVENOUS ONCE
Status: COMPLETED | OUTPATIENT
Start: 2024-10-10 | End: 2024-10-10

## 2024-10-10 RX ADMIN — PIPERACILLIN SODIUM AND TAZOBACTAM SODIUM 3.38 G: 3; .375 INJECTION, SOLUTION INTRAVENOUS at 22:46

## 2024-10-10 RX ADMIN — IPRATROPIUM BROMIDE AND ALBUTEROL SULFATE 3 ML: 2.5; .5 SOLUTION RESPIRATORY (INHALATION) at 07:26

## 2024-10-10 RX ADMIN — PIPERACILLIN SODIUM AND TAZOBACTAM SODIUM 3.38 G: 3; .375 INJECTION, SOLUTION INTRAVENOUS at 01:00

## 2024-10-10 RX ADMIN — HEPARIN SODIUM 5000 UNITS: 5000 INJECTION INTRAVENOUS; SUBCUTANEOUS at 13:34

## 2024-10-10 RX ADMIN — FUROSEMIDE 20 MG: 10 INJECTION, SOLUTION INTRAMUSCULAR; INTRAVENOUS at 15:19

## 2024-10-10 RX ADMIN — PANTOPRAZOLE SODIUM 40 MG: 40 INJECTION, POWDER, FOR SOLUTION INTRAVENOUS at 06:02

## 2024-10-10 RX ADMIN — POLYETHYLENE GLYCOL 3350 17 G: 17 POWDER, FOR SOLUTION ORAL at 17:41

## 2024-10-10 RX ADMIN — PIPERACILLIN SODIUM AND TAZOBACTAM SODIUM 3.38 G: 3; .375 INJECTION, SOLUTION INTRAVENOUS at 17:41

## 2024-10-10 RX ADMIN — FOLIC ACID 1 MG: 1 TABLET ORAL at 08:41

## 2024-10-10 RX ADMIN — CYANOCOBALAMIN TAB 500 MCG 500 MCG: 500 TAB at 08:40

## 2024-10-10 RX ADMIN — Medication 2 L/MIN: at 20:00

## 2024-10-10 RX ADMIN — Medication 4 L/MIN: at 12:29

## 2024-10-10 RX ADMIN — DOCUSATE SODIUM 50 MG: 50 LIQUID ORAL at 08:40

## 2024-10-10 RX ADMIN — PIPERACILLIN SODIUM AND TAZOBACTAM SODIUM 3.38 G: 3; .375 INJECTION, SOLUTION INTRAVENOUS at 05:55

## 2024-10-10 RX ADMIN — ACETAMINOPHEN 650 MG: 650 LIQUID ORAL at 22:46

## 2024-10-10 RX ADMIN — METOPROLOL TARTRATE 25 MG: 25 TABLET, FILM COATED ORAL at 22:47

## 2024-10-10 RX ADMIN — CLOBAZAM 10 MG: 10 TABLET ORAL at 22:46

## 2024-10-10 RX ADMIN — HEPARIN SODIUM 5000 UNITS: 5000 INJECTION INTRAVENOUS; SUBCUTANEOUS at 05:54

## 2024-10-10 RX ADMIN — DOCUSATE SODIUM 50 MG: 50 LIQUID ORAL at 22:46

## 2024-10-10 RX ADMIN — METOPROLOL TARTRATE 25 MG: 25 TABLET, FILM COATED ORAL at 08:40

## 2024-10-10 RX ADMIN — IPRATROPIUM BROMIDE AND ALBUTEROL SULFATE 3 ML: 2.5; .5 SOLUTION RESPIRATORY (INHALATION) at 19:32

## 2024-10-10 RX ADMIN — PREDNISOLONE SODIUM PHOSPHATE 40 MG: 15 SOLUTION ORAL at 13:32

## 2024-10-10 RX ADMIN — LEVETIRACETAM 750 MG: 250 TABLET, FILM COATED ORAL at 08:40

## 2024-10-10 RX ADMIN — LACOSAMIDE 250 MG: 50 TABLET, FILM COATED ORAL at 22:47

## 2024-10-10 RX ADMIN — ACETAMINOPHEN 650 MG: 650 LIQUID ORAL at 08:48

## 2024-10-10 RX ADMIN — LACOSAMIDE 250 MG: 50 TABLET, FILM COATED ORAL at 08:40

## 2024-10-10 RX ADMIN — GADOTERATE MEGLUMINE 8 ML: 376.9 INJECTION, SOLUTION INTRAVENOUS at 14:33

## 2024-10-10 RX ADMIN — HEPARIN SODIUM 5000 UNITS: 5000 INJECTION INTRAVENOUS; SUBCUTANEOUS at 22:46

## 2024-10-10 RX ADMIN — PIPERACILLIN SODIUM AND TAZOBACTAM SODIUM 3.38 G: 3; .375 INJECTION, SOLUTION INTRAVENOUS at 12:52

## 2024-10-10 RX ADMIN — SENNOSIDES 5 ML: 8.8 LIQUID ORAL at 22:50

## 2024-10-10 RX ADMIN — POTASSIUM CHLORIDE 20 MEQ: 1.5 POWDER, FOR SOLUTION ORAL at 08:41

## 2024-10-10 RX ADMIN — IPRATROPIUM BROMIDE AND ALBUTEROL SULFATE 3 ML: 2.5; .5 SOLUTION RESPIRATORY (INHALATION) at 12:29

## 2024-10-10 RX ADMIN — AMLODIPINE BESYLATE 7.5 MG: 5 TABLET ORAL at 08:40

## 2024-10-10 RX ADMIN — LEVETIRACETAM 750 MG: 250 TABLET, FILM COATED ORAL at 22:47

## 2024-10-10 RX ADMIN — IPRATROPIUM BROMIDE AND ALBUTEROL SULFATE 3 ML: 2.5; .5 SOLUTION RESPIRATORY (INHALATION) at 00:35

## 2024-10-10 RX ADMIN — ACETAMINOPHEN 650 MG: 650 LIQUID ORAL at 15:19

## 2024-10-10 RX ADMIN — PREDNISOLONE SODIUM PHOSPHATE 40 MG: 15 SOLUTION ORAL at 23:07

## 2024-10-10 ASSESSMENT — PAIN SCALES - WONG BAKER: WONGBAKER_NUMERICALRESPONSE: HURTS WORST

## 2024-10-10 ASSESSMENT — COGNITIVE AND FUNCTIONAL STATUS - GENERAL
DRESSING REGULAR LOWER BODY CLOTHING: TOTAL
DRESSING REGULAR UPPER BODY CLOTHING: TOTAL
DAILY ACTIVITIY SCORE: 6
DAILY ACTIVITIY SCORE: 6
MOVING FROM LYING ON BACK TO SITTING ON SIDE OF FLAT BED WITH BEDRAILS: TOTAL
TURNING FROM BACK TO SIDE WHILE IN FLAT BAD: TOTAL
CLIMB 3 TO 5 STEPS WITH RAILING: TOTAL
MOVING TO AND FROM BED TO CHAIR: TOTAL
HELP NEEDED FOR BATHING: TOTAL
EATING MEALS: TOTAL
WALKING IN HOSPITAL ROOM: TOTAL
MOVING FROM LYING ON BACK TO SITTING ON SIDE OF FLAT BED WITH BEDRAILS: TOTAL
PERSONAL GROOMING: TOTAL
HELP NEEDED FOR BATHING: TOTAL
TURNING FROM BACK TO SIDE WHILE IN FLAT BAD: TOTAL
DRESSING REGULAR UPPER BODY CLOTHING: TOTAL
CLIMB 3 TO 5 STEPS WITH RAILING: TOTAL
WALKING IN HOSPITAL ROOM: TOTAL
EATING MEALS: TOTAL
MOBILITY SCORE: 6
TOILETING: TOTAL
STANDING UP FROM CHAIR USING ARMS: TOTAL
MOBILITY SCORE: 6
STANDING UP FROM CHAIR USING ARMS: TOTAL
DRESSING REGULAR LOWER BODY CLOTHING: TOTAL
TOILETING: TOTAL
MOVING TO AND FROM BED TO CHAIR: TOTAL
PERSONAL GROOMING: TOTAL

## 2024-10-10 ASSESSMENT — PAIN SCALES - PAIN ASSESSMENT IN ADVANCED DEMENTIA (PAINAD)
TOTALSCORE: 0
NEGVOCALIZATION: OCCASIONAL MOAN/GROAN, LOW SPEECH, NEGATIVE/DISAPPROVING QUALITY
TOTALSCORE: 3
BODYLANGUAGE: TENSE, DISTRESSED PACING, FIDGETING
CONSOLABILITY: NO NEED TO CONSOLE
CONSOLABILITY: NO NEED TO CONSOLE
FACIALEXPRESSION: SMILING OR INEXPRESSIVE
TOTALSCORE: MEDICATION (SEE MAR)
BREATHING: NORMAL
BODYLANGUAGE: RELAXED
BREATHING: OCCASIONAL LABORED BREATHING, SHORT PERIOD OF HYPERVENTILATION
FACIALEXPRESSION: SMILING OR INEXPRESSIVE

## 2024-10-10 ASSESSMENT — PAIN - FUNCTIONAL ASSESSMENT
PAIN_FUNCTIONAL_ASSESSMENT: PAINAD (PAIN ASSESSMENT IN ADVANCED DEMENTIA SCALE)
PAIN_FUNCTIONAL_ASSESSMENT: PAINAD (PAIN ASSESSMENT IN ADVANCED DEMENTIA SCALE)

## 2024-10-10 NOTE — CARE PLAN
The patient's goals for the shift include      The clinical goals for the shift include pt safety

## 2024-10-10 NOTE — CARE PLAN
The clinical goals for the shift include patient will be weaned down in o2 requirements by end of shift today.    Problem: Fall/Injury  Goal: Verbalize understanding of personal risk factors for fall in the hospital  Outcome: Not Progressing  Goal: Verbalize understanding of risk factor reduction measures to prevent injury from fall in the home  Outcome: Not Progressing  Goal: Use assistive devices by end of the shift  Outcome: Not Progressing  Goal: Pace activities to prevent fatigue by end of the shift  Outcome: Not Progressing     Problem: Pain  Goal: Walks with improved pain control throughout the shift  Outcome: Not Progressing  Goal: Performs ADL's with improved pain control throughout shift  Outcome: Not Progressing  Goal: Participates in PT with improved pain control throughout the shift  Outcome: Not Progressing  Goal: Free from opioid side effects throughout the shift  Outcome: Not Progressing  Goal: Free from acute confusion related to pain meds throughout the shift  Outcome: Not Progressing

## 2024-10-10 NOTE — PROGRESS NOTES
For follow-up of:  Aspiration pneumonia    Subjective   Interval History:  He is breathing adequately on 3 L/min nasal cannula oxygen.  He has had no more fevers.  His mother is at the bedside       Objective     Current Facility-Administered Medications   Medication Dose Route Frequency Provider Last Rate Last Admin    acetaminophen (Tylenol) oral liquid 650 mg  650 mg oral q4h PRN Ritu Baldwin PharmD   650 mg at 10/10/24 1519    acetaminophen (Tylenol) tablet 650 mg  650 mg oral q4h PRN Marcos G Salomone, DO   650 mg at 10/09/24 1111    Or    acetaminophen (Tylenol) suppository 650 mg  650 mg rectal q4h PRN Marcos G Salomone, DO   650 mg at 10/06/24 2046    amLODIPine (Norvasc) tablet 7.5 mg  7.5 mg oral Daily Marcos G Salomone, DO   7.5 mg at 10/10/24 0840    bisacodyl (Dulcolax) EC tablet 5 mg  5 mg oral Daily PRN Marcos G Salomone, DO        cannabidiol (Epidiolex) solution 220 mg--PATIENT OWN MEDICATION  5 mg/kg oral BID Tati White MD        cloBAZam (Onfi) tablet 10 mg  10 mg oral Nightly Marcos G Salomone, DO   10 mg at 10/09/24 2116    cyanocobalamin (Vitamin B-12) tablet 500 mcg  500 mcg oral Daily Marcos G Salomone, DO   500 mcg at 10/10/24 0840    docusate sodium (Colace) oral liquid 50 mg  50 mg oral BID Marcos G Salomone, DO   50 mg at 10/10/24 0840    ergocalciferol (Vitamin D-2) capsule 1,250 mcg  1,250 mcg oral Weekly Marcos G Salomone, DO   1,250 mcg at 10/06/24 1105    folic acid (Folvite) tablet 1 mg  1 mg oral Daily Marcos G Salomone, DO   1 mg at 10/10/24 0841    glycopyrrolate PF (Robinul) 0.2 mg/mL injection 0.2 mg  0.2 mg intravenous q4h PRN Tati White MD   0.2 mg at 10/09/24 1111    heparin (porcine) injection 5,000 Units  5,000 Units subcutaneous q8h POPEYE Marcos G Salomone, DO   5,000 Units at 10/10/24 1334    ipratropium-albuteroL (Duo-Neb) 0.5-2.5 mg/3 mL nebulizer solution 3 mL  3 mL nebulization q6h Marcos Mercado DO   3 mL at 10/10/24 1229    ipratropium-albuteroL (Duo-Neb) 0.5-2.5 mg/3 mL  nebulizer solution 3 mL  3 mL nebulization q2h PRN Marcos G Salomone, DO        lacosamide (Vimpat) tablet 250 mg  250 mg oral BID Deep Coronel, DO   250 mg at 10/10/24 0840    levETIRAcetam (Keppra) tablet 750 mg  750 mg oral BID Deep Coronel, DO   750 mg at 10/10/24 0840    metoprolol tartrate (Lopressor) tablet 25 mg  25 mg oral BID Marcos G Salomone, DO   25 mg at 10/10/24 0840    ondansetron (Zofran) tablet 4 mg  4 mg oral q8h PRN Marcos G Salomone, DO        Or    ondansetron (Zofran) injection 4 mg  4 mg intravenous q8h PRN Marcos G Salomone, DO        oxyCODONE (Roxicodone) immediate release tablet 5 mg  5 mg oral q6h PRN Marcos G Salomone, DO        oxygen (O2) therapy   inhalation Continuous - Inhalation Deep Coronel, DO   4 L/min at 10/10/24 1229    pantoprazole (ProtoNix) EC tablet 40 mg  40 mg oral Daily before breakfast Marcos G Salomone, DO   40 mg at 10/07/24 0600    Or    pantoprazole (ProtoNix) injection 40 mg  40 mg intravenous Daily before breakfast Marcos G Salomone, DO   40 mg at 10/10/24 0602    piperacillin-tazobactam (Zosyn) 3.375 g in dextrose (iso) IV 50 mL  3.375 g intravenous q6h Marcos G Salomone, DO   Stopped at 10/10/24 1341    prednisoLONE sodium phosphate (OrapRED) oral solution 40 mg  40 mg oral BID Nancy Sunshine, APRN-CNP        senna (Senokot) 8.8 mg/5 mL syrup 5 mL  5 mL oral Nightly Marcos G Salomone, DO   5 mL at 10/09/24 2116        Last Recorded Vitals  /84 (BP Location: Left arm, Patient Position: Sitting)   Pulse (!) 113   Temp 37.3 °C (99.2 °F) (Temporal)   Resp 20   Wt (!) 43.9 kg (96 lb 12.5 oz)   SpO2 94%   General: no acute distress, lying in bed, comfortable, awake and tracks with eyes, mother at bedside  HEENT: pharynx not examined  CVS: RRR  Resp: Coarse breath sounds bilaterally which are decreased in the bases, wearing nasal cannula oxygen  ABD: soft, NT, ND, PEG  : External urinary catheter  EXT: no edema  Skin: Right lower quadrant surgical site    Relevant  Results:    Labs:   Results from last 72 hours   Lab Units 10/10/24  0516 10/09/24  0627 10/08/24  0524   SODIUM mmol/L 140 140 136   POTASSIUM mmol/L 3.3* 3.4* 3.5   CHLORIDE mmol/L 99 100 100   BUN mg/dL 11 11 8   CREATININE mg/dL 0.46* 0.49* 0.57   MAGNESIUM mg/dL 2.10  --   --    PHOSPHORUS mg/dL 2.1* 1.0* 1.2*     Results from last 72 hours   Lab Units 10/10/24  0516 10/09/24  0627 10/08/24  0524   WBC AUTO x10*3/uL 7.6 9.8 9.4   HEMOGLOBIN g/dL 10.7* 12.1* 12.8*   HEMATOCRIT % 34.0* 38.5* 39.1*   PLATELETS AUTO x10*3/uL 181 160 157       Microbiology data: I have personally and independently reviewed and intrepreted the lab results  10/5/2024 blood culture show no growth  10/6/2024 MRSA swab negative  10/6/2024 urine Legionella antigen negative; urine pneumococcal antigen negative  10/8/2024 MRSA swab negative  10/8/2024 sputum culture was a poor sample contaminated with saliva    Imaging data: I have personally and independently reviewed and interpreted the imaging studies  10/5/2024 chest x-ray shows lung infiltrates  10/5/2024 head CT shows no acute issues  10/5/2024 CT chest abdomen pelvis shows right more than left infiltrates with debris in the trachea; there is soft tissue gas around the newly inserted pump  10/6/2024 chest x-ray shows bilateral infiltrates  10/8/2024 CT head shows no acute issues  10/8/2024 CT abdomen pelvis shows postoperative fluid at the baclofen pump; there is increased and more confluent right middle lobe and right lower lobe infiltrate; there is fluid distention of the bowel with constipation    Assessment/Plan     MY IMPRESSION & RECOMMENDATIONS:  Aspiration pneumonia, being treated with IV antibiotics.  I have personally and independently reviewed and interpreted the laboratory tests, imaging studies, and the documentation from other healthcare providers.  I am monitoring for side effects from Zosyn as outlined in the previous note.  His acute hypoxic respiratory failure is  showing considerable improvement and he is requiring much less oxygen supplementation.  He is no longer having fevers.  My plan will be to transition to Augmentin through his PEG tube when he is ready for discharge.     -Continue Zosyn for now     Other issues:  #Acute metabolic encephalopathy, for which he had a MRI today  #Cerebral palsy with spastic quadriplegia status post PEG and baclofen pump  #Seizure disorder  #GERD  #Hypertension       Micah Padilla MD

## 2024-10-10 NOTE — PROGRESS NOTES
Vargas Romero is a 29 y.o. male on day 4 of admission presenting with Aspiration pneumonia due to anesthesia during labor and delivery, unspecified laterality, unspecified part of lung ([facility]-HCC).      Subjective   Patient and examined. Awake/alert/oriented. Denies chest pain, shortness of breath, fevers, chills, nausea, or vomiting.  Mom did verbalize concern regarding symptoms related to baclofen withdrawal.  Pulm at bedside and feels patient is overall improved today.  Still having rales on exam and increase secretions.  Will give IV Lasix x 1.  Message out to orthopedic pain surgeon and made aware of mother's concerns with the baclofen pump.     Objective     Last Recorded Vitals  /76 (Patient Position: Lying)   Pulse 103   Temp 37.6 °C (99.6 °F) (Axillary)   Resp 18   Wt (!) 43.9 kg (96 lb 12.5 oz)   SpO2 94%   Intake/Output last 3 Shifts:    Intake/Output Summary (Last 24 hours) at 10/10/2024 1350  Last data filed at 10/10/2024 0801  Gross per 24 hour   Intake 600 ml   Output 350 ml   Net 250 ml       Admission Weight  Weight: (!) 44.5 kg (98 lb) (10/05/24 2218)    Daily Weight  10/06/24 : (!) 43.9 kg (96 lb 12.5 oz)    Image Results  XR chest 1 view  Narrative: Interpreted By:  Madiha Elias,   STUDY:  XR CHEST 1 VIEW;  10/10/2024 8:09 am      INDICATION:  Signs/Symptoms:acute hypoxic respiratory failure, wax/waning  worsening o2 req.          COMPARISON:  10/26/2024      ACCESSION NUMBER(S):  [id number]      ORDERING CLINICIAN:  MICAELA BARROS      FINDINGS:  Artifact from overlying monitoring leads noted. Patient is rotated.  Coarse bilateral infiltrates, increased in the right upper chest. No  pleural effusion or pneumothorax. The cardiac silhouette is within  normal limits for size.      Impression: Coarse bilateral infiltrates, increased in the right upper chest  since 10/06/2024.      MACRO:  None.      Signed by: Madiha Elias 10/10/2024 8:15 AM  Dictation workstation:    [id number]      Physical Exam  Vitals reviewed.   Constitutional:       Comments: Drowsy, awakens easily   HENT:      Head: Normocephalic and atraumatic.   Eyes:      Extraocular Movements: Extraocular movements intact.      Conjunctiva/sclera: Conjunctivae normal.   Cardiovascular:      Rate and Rhythm: Normal rate and regular rhythm.   Pulmonary:      Effort: Pulmonary effort is normal.      Breath sounds: Rhonchi and rales present. No wheezing.   Abdominal:      General: Bowel sounds are normal.      Palpations: Abdomen is soft.      Tenderness: There is no abdominal tenderness.   Skin:     General: Skin is warm and dry.   Neurological:      Mental Status: He is disoriented.      Motor: Weakness present.         Relevant Results  Lab Results   Component Value Date    GLUCOSE 163 (H) 10/10/2024    CALCIUM 8.6 10/10/2024     10/10/2024    K 3.3 (L) 10/10/2024    CO2 34 (H) 10/10/2024    CL 99 10/10/2024    BUN 11 10/10/2024    CREATININE 0.46 (L) 10/10/2024      Lab Results   Component Value Date    WBC 7.6 10/10/2024    HGB 10.7 (L) 10/10/2024    HCT 34.0 (L) 10/10/2024    MCV 82 10/10/2024     10/10/2024    XR chest 1 view  Result Date: 10/10/2024  Coarse bilateral infiltrates, increased in the right upper chest since 10/06/2024.   MACRO: None.   Signed by: Madiha Elias 10/10/2024 8:15 AM Dictation workstation:   [id number]    CT abdomen pelvis w IV contrast  Result Date: 10/8/2024  1. Confluent edema surrounding the baclofen pump which extends distally to the right groin and months pubis and right lateral proximal thigh and slightly superiorly to the level of the mid abdominal wall. There is no evidence of a discrete loculated fluid collection. There has been resolution of previous abdominal wall air from recent placement. This edema is nonspecific in the immediate postoperative context and given that it was present immediately after placement is most likely postoperative in nature. Continued  clinical follow-up recommended to ensure resolution.   2. Compared to CT 10/06/2024 there is significant worsening of now confluent diffuse consolidation within the entirety of the visualized right middle lobe and right lower lobe. There is also worsening of consolidation throughout the lingula and left lower lobe that is becoming more confluent and diffuse as well with background ground-glass opacity. There is visualization of gastroesophageal reflux into the distal esophagus. Differential diagnosis includes worsening pneumonia, developing diffuse alveolar damage superimposed upon pneumonia or, call pneumonitis from recent aspiration though less likely given clear airways.   3. Worsening fluid distention of small and large bowel that could be related to developing enteritis/colitis.   4. Large amount of impacted stool in the rectum which is distended up to 8.2 cm similar prior study. Consider disimpaction to avoid complication of stercoral colitis.   5. Redemonstration of intrahepatic portal venous-hepatic venous shunt segment 7.   MACRO: None.   Signed by: Rafi Morrell 10/8/2024 10:12 PM Dictation workstation:   [id number]    CT head wo IV contrast  Result Date: 10/8/2024  No acute intracranial abnormality.   Redemonstration of age disproportionate supratentorial volume loss and white matter gliosis, most likely reflecting diffuse remote cerebral insult.   MACRO: None.   Signed by: Rafi Morrell 10/8/2024 10:01 PM Dictation workstation:   [id number]    ECG 12 lead  Result Date: 10/7/2024  Sinus tachycardia Moderate voltage criteria for LVH, may be normal variant Borderline ECG When compared with ECG of 05-OCT-2024 22:21, (unconfirmed) No significant change was found Confirmed by Lauri Solis ([id number]) on 10/7/2024 10:36:40 AM    ECG 12 Lead  Result Date: 10/7/2024  Normal sinus rhythm Septal infarct , age undetermined Abnormal ECG No previous ECGs available See ED provider note for full interpretation  and clinical correlation Confirmed by Radha Vásquez ([id number]) on 10/7/2024 10:11:58 AM    XR chest 1 view  Result Date: 10/6/2024  1. Extensive bilateral multifocal airspace disease worse in the right lung base. Underlying pneumonia and aspiration pneumonitis in the differential       MACRO: None   Signed by: Anthony Silva 10/6/2024 6:42 PM Dictation workstation:   [id number]    CT angio chest for pulmonary embolism  Result Date: 10/6/2024  1. Patchy right-greater-than-left lower lobe consolidative and ground-glass opacities compatible with pneumonia. Debris within the dependent trachea raising concern for aspiration. 2. No pulmonary embolism. Motion artifact limits evaluation of subsegmental pulmonary arteries. 3. Right lower quadrant subcutaneous medication pump in place; metallic streak artifact limits evaluation of adjacent structures. There is soft tissue edema and soft tissue gas surrounding the pump compatible with recent placement. No distinct organized fluid collection is identified.     Signed by: Timothy Quinn 10/6/2024 1:27 AM Dictation workstation:   [id number]    CT abdomen pelvis w IV contrast  Result Date: 10/6/2024  1. Patchy right-greater-than-left lower lobe consolidative and ground-glass opacities compatible with pneumonia. Debris within the dependent trachea raising concern for aspiration. 2. No pulmonary embolism. Motion artifact limits evaluation of subsegmental pulmonary arteries. 3. Right lower quadrant subcutaneous medication pump in place; metallic streak artifact limits evaluation of adjacent structures. There is soft tissue edema and soft tissue gas surrounding the pump compatible with recent placement. No distinct organized fluid collection is identified.     Signed by: Timothy Quinn 10/6/2024 1:27 AM Dictation workstation:   [id number]    CT head wo IV contrast  Result Date: 10/6/2024  1. No acute intracranial hemorrhage or mass effect. 2. Prominent ventricles  and extra-axial CSF spaces, reflecting parenchymal volume loss. 3. Patchy hypoattenuation of the white matter which is favored to be artifactual.   Signed by: Timothy Quinn 10/6/2024 1:13 AM Dictation workstation:   [id number]    XR chest 1 view  Result Date: 10/6/2024  The right lung is asymmetrically lucent compared to the left. This finding is nonspecific and may represent congenital lobar emphysema, trapped air secondary to endobronchial obstruction, or left-sided airspace disease. Recommend chest CT to better evaluate.   Prominent gas is present in the left upper quadrant, possibly in the colon and stomach but extraluminal air is not excluded. This can also be evaluated on the above recommended chest CT.   MACRO: None   Signed by: Felix Devlin 10/6/2024 12:13 AM Dictation workstation:   [id number]         Assessment/Plan      Assessment & Plan  Aspiration pneumonia due to anesthesia during labor and delivery, unspecified laterality, unspecified part of lung ([facility]HS-HCC)  Acute Hypoxic Respiratory Failure  Sepsis due to aspiration pneumonia  - lactate elevation resolved. Urine antigens negative thus azithromycin stopped. MRSA PCR negative. Sputum cultures ordered (noted contamination), Blood cultures ngtd. Continue IV Zosyn. Start steroids per pulm recs, increased to twice daily  - See repeat chest x-ray above  - Given IV Lasix x 1 dose  -CT a/p done overnight 10/9; showing nonspecific edema which is likely postop. Significant worsening of diffuse consolidation in RML, RLL  - Pulm and RT consulted; will need aggressive pulmonary hygiene, vaishali nebs. NT Suctioning bid, Vest therapy.   - wean O2 for goal o2 sat 88-92%  - Mom concerned that symptoms are related to baclofen withdrawal or issues with the pump, orthopedic pain management surgeon was made aware however does not come to this hospital.  Seen by pulmonary who feels patient has improved overall.  Will continue to monitor.     Abnormal eye  movements  -Nocturnist called as pt was looking toward his left often, mom concerned for stroke  -CT head negative  -Neuro consulted  -Recommend MRI brain, pending results     Sinus tachycardia  - due to above; treat underlying issues not HR  -500ml fluid bolus on 10/9, today giving IV Lasix due to increased congestion  - Monitor on telemetry     Cerebral palsy with spastic quadriplegia  Seizure disorder  - continue cannabidiol, onfi, vimpat, keppra  - admitted after baclofen pump placement     GERD  - continue ppi     HTN  - continue norvasc, metoprolol    Constipation  - Scheduled stool softeners and laxative  - As needed enema/suppositories           DVT Prophylaxis: subcutaneous Heparin     Disposition: continue to monitor inpatient, await consultant recommendations, await test results, and await clinical improvement                  LIBERTAD Damon-CNP

## 2024-10-10 NOTE — PROGRESS NOTES
Vargas Romero is a 29 y.o. male on day 4 of admission presenting with Aspiration pneumonia due to anesthesia during labor and delivery, unspecified laterality, unspecified part of lung (HHS-HCC).    Subjective   Patient seen and examined with no acute events overnight. Oxygen currently weaned down to 4 L satting 96%.  Mom reports good sleep overnight. Continues to slowly improve.  Intermittent fevers with Tmax 37.4.  CXR today showed increased right upper lobe infiltrates with continued diffuse coarse infiltrates.  Dependent crackles noted in the right base.  Discussed with primary service KENTRELL and given the crackles & slightly worse CXR, will increase steroids to twice daily and give one-time dose of Lasix today.  Discussed NT suctioning with RT, amount being suctioned has decreased over the last 48 hours.  Will stop scheduled NT suctioning and use as needed.     Objective     Physical Exam    Constitutional:   Thin, chronically ill, NAD  HENT: Atraumatic, moist mucous membranes  Eyes: Nonicteric  Neck: Supple  Cardiovascular: S1, S2 normal, tachycardic, HR 100s, no murmur appreciated  Pulmonary: Poor to fair air entry with moderate right basilar crackles and diffuse mild coarse breath sounds that improve with cough/suction upper fields, overall improved WOB; weak cough   Abdominal: semi-soft, non distended, + BS  Musculoskeletal: Permanent contractures; diffuse muscle wasting  Extremities: No BLE edema  Lymphadenopathy: No nuchal LAP  Skin: Warm, semimoist  Neurological: Awake, nonverbal, unable to follow commands; more responsive to people and situations today    Medications:  amLODIPine, 7.5 mg, oral, Daily  cannabidiol, 5 mg/kg, oral, BID  cloBAZam, 10 mg, oral, Nightly  cyanocobalamin, 500 mcg, oral, Daily  docusate sodium, 50 mg, oral, BID  ergocalciferol, 1,250 mcg, oral, Weekly  folic acid, 1 mg, oral, Daily  heparin (porcine), 5,000 Units, subcutaneous, q8h POPEYE  ipratropium-albuteroL, 3 mL, nebulization,  "q6h  lacosamide, 250 mg, oral, BID  levETIRAcetam, 750 mg, oral, BID  metoprolol tartrate, 25 mg, oral, BID  oxygen, , inhalation, Continuous - Inhalation  pantoprazole, 40 mg, oral, Daily before breakfast   Or  pantoprazole, 40 mg, intravenous, Daily before breakfast  piperacillin-tazobactam, 3.375 g, intravenous, q6h  prednisoLONE, 40 mg, oral, Daily  senna, 5 mL, oral, Nightly    PRN medications: acetaminophen, acetaminophen **OR** [DISCONTINUED] acetaminophen **OR** acetaminophen, bisacodyl, glycopyrrolate, ipratropium-albuteroL, ondansetron **OR** ondansetron, oxyCODONE      Last Recorded Vitals  Blood pressure (!) 164/97, pulse (!) 112, temperature 37.4 °C (99.3 °F), temperature source Temporal, resp. rate 18, height 1.6 m (5' 2.99\"), weight (!) 43.9 kg (96 lb 12.5 oz), SpO2 94%.  Intake/Output last 3 Shifts:  I/O last 3 completed shifts:  In: 700 (15.9 mL/kg) [NG/GT:200; IV Piggyback:500]  Out: 100 (2.3 mL/kg) [Urine:100 (0.1 mL/kg/hr)]  Weight: 43.9 kg     Relevant Results  Results for orders placed or performed during the hospital encounter of 10/05/24 (from the past 24 hour(s))   POCT GLUCOSE   Result Value Ref Range    POCT Glucose 190 (H) 74 - 99 mg/dL   CBC and Auto Differential   Result Value Ref Range    WBC 7.6 4.4 - 11.3 x10*3/uL    nRBC 0.0 0.0 - 0.0 /100 WBCs    RBC 4.13 (L) 4.50 - 5.90 x10*6/uL    Hemoglobin 10.7 (L) 13.5 - 17.5 g/dL    Hematocrit 34.0 (L) 41.0 - 52.0 %    MCV 82 80 - 100 fL    MCH 25.9 (L) 26.0 - 34.0 pg    MCHC 31.5 (L) 32.0 - 36.0 g/dL    RDW 14.1 11.5 - 14.5 %    Platelets 181 150 - 450 x10*3/uL    Neutrophils % 53.4 40.0 - 80.0 %    Immature Granulocytes %, Automated 1.1 (H) 0.0 - 0.9 %    Lymphocytes % 27.9 13.0 - 44.0 %    Monocytes % 16.0 2.0 - 10.0 %    Eosinophils % 1.3 0.0 - 6.0 %    Basophils % 0.3 0.0 - 2.0 %    Neutrophils Absolute 4.03 1.20 - 7.70 x10*3/uL    Immature Granulocytes Absolute, Automated 0.08 0.00 - 0.70 x10*3/uL    Lymphocytes Absolute 2.11 1.20 - " 4.80 x10*3/uL    Monocytes Absolute 1.21 (H) 0.10 - 1.00 x10*3/uL    Eosinophils Absolute 0.10 0.00 - 0.70 x10*3/uL    Basophils Absolute 0.02 0.00 - 0.10 x10*3/uL   Renal Function Panel   Result Value Ref Range    Glucose 163 (H) 74 - 99 mg/dL    Sodium 140 136 - 145 mmol/L    Potassium 3.3 (L) 3.5 - 5.3 mmol/L    Chloride 99 98 - 107 mmol/L    Bicarbonate 34 (H) 21 - 32 mmol/L    Anion Gap 10 10 - 20 mmol/L    Urea Nitrogen 11 6 - 23 mg/dL    Creatinine 0.46 (L) 0.50 - 1.30 mg/dL    eGFR >90 >60 mL/min/1.73m*2    Calcium 8.6 8.6 - 10.3 mg/dL    Phosphorus 2.1 (L) 2.5 - 4.9 mg/dL    Albumin 3.0 (L) 3.4 - 5.0 g/dL   Magnesium   Result Value Ref Range    Magnesium 2.10 1.60 - 2.40 mg/dL      XR chest 1 view  Result Date: 10/6/2024  Interpreted By:  Anthony Silva, STUDY: XR CHEST 1 VIEW;  10/6/2024 6:39 pm   INDICATION: Signs/Symptoms:hypoxia.     COMPARISON: 10/05/2024   ACCESSION NUMBER(S): [id number]   ORDERING CLINICIAN: KENNETH MCADAMS   FINDINGS:   There is multifocal airspace disease in the lungs predominantly at the right lung base, worsened from the prior. Multifocal pneumonia versus aspiration pneumonitis in the differential. No large effusion seen. No pneumothorax. The cardiac silhouette is within normal limits for size.       1. Extensive bilateral multifocal airspace disease worse in the right lung base. Underlying pneumonia and aspiration pneumonitis in the differential       MACRO: None   Signed by: Anthony Silva 10/6/2024 6:42 PM Dictation workstation:   [id number]    CT angio chest for pulmonary embolism  Result Date: 10/6/2024  Interpreted By:  Timothy Quinn, STUDY: CT ANGIO CHEST FOR PULMONARY EMBOLISM; CT ABDOMEN PELVIS W IV CONTRAST;  10/6/2024 12:37 am   INDICATION: Signs/Symptoms:ams, hypoxia; Signs/Symptoms:recent baclofen pump placement, ams   COMPARISON: None.   ACCESSION NUMBER(S): [id number]; [id number]   ORDERING CLINICIAN: LAYA LA   TECHNIQUE: CT of the chest,  abdomen, and pelvis was performed. Contiguous axial images were obtained through the chest, abdomen and pelvis. Coronal and sagittal reconstructions were performed. Intravenous contrast was administered, 75 mL Omnipaque 350. Chest images were acquired in the pulmonary angiographic phase. MIP/3D reconstructions were performed on a separate workstation and provided for interpretation.   FINDINGS: CHEST:   LOWER NECK AND CHEST WALL:  Within normal limits.   MEDIASTINUM/TAVARES:No lymphadenopathy. Esophagus is unremarkable.   CARDIOVASCULAR:  Cardiac chamber size within normal limits. No pericardial effusion.  Aortic caliber normal. Normal caliber of main pulmonary artery. No pulmonary embolism. Motion artifact limits evaluation of subsegmental pulmonary arteries.   LUNGS, AIRWAYS, AND PLEURA:  Patchy right-greater-than-left lower lobe consolidative and ground-glass opacities. Debris within the dependent trachea.   MUSCULOSKELETAL: No acute osseous abnormality or suspicious osseous lesions.  Intraspinal medication catheter in place.       ABDOMEN:   LIVER: Within normal limits.   BILE DUCTS: Normal caliber.   GALLBLADDER: No calcified stones. No wall thickening.   PANCREAS: Within normal limits.   SPLEEN: Within normal limits.   ADRENALS: Within normal limits.   KIDNEYS, URETERS, and BLADDER: Early contrast excretion limits evaluation for small renal calculi. No hydronephrosis.  Ureters are non-dilated. Urinary bladder within normal limits.   REPRODUCTIVE: No pelvic masses.   VESSELS: Aorta and IVC appear normal.   RETROPERITONEUM and LYMPH NODES: No lymphadenopathy.   BOWEL: Gastrostomy tube in place. Small bowel is non-dilated. Normal appendix. Large bowel is normal.   PERITONEUM: No ascites or free air. No fluid collection.   BODY WALL: Right lower quadrant subcutaneous medication pump in place; metallic streak artifact limits evaluation of adjacent structures. There is soft tissue edema and soft tissue gas surrounding  the pump compatible with recent placement. No distinct organized fluid collection is identified.   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 Thoracolumbar dextrocurvature. No acute osseous abnormality.         1. Patchy right-greater-than-left lower lobe consolidative and ground-glass opacities compatible with pneumonia. Debris within the dependent trachea raising concern for aspiration. 2. No pulmonary embolism. Motion artifact limits evaluation of subsegmental pulmonary arteries. 3. Right lower quadrant subcutaneous medication pump in place; metallic streak artifact limits evaluation of adjacent structures. There is soft tissue edema and soft tissue gas surrounding the pump compatible with recent placement. No distinct organized fluid collection is identified.     Signed by: Timothy Quinn 10/6/2024 1:27 AM Dictation workstation:   [id number]    XR chest 1 view  Result Date: 10/6/2024  Interpreted By:  Felix Devlin, STUDY: XR CHEST 1 VIEW;  10/5/2024 11:25 pm   INDICATION: Signs/Symptoms:hypoxia.   COMPARISON: None.   ACCESSION NUMBER(S): [id number]   ORDERING CLINICIAN: LAYA LA   FINDINGS:     CARDIOMEDIASTINAL SILHOUETTE: Rightward mediastinal shift.   LUNGS: The right lung is asymmetrically lucent compared to the left. No definite pleural effusion or pneumothorax.   ABDOMEN: Gas in the left upper quadrant, possibly in the stomach, but extraluminal air is not excluded.   BONES: No acute osseous abnormality.       The right lung is asymmetrically lucent compared to the left. This finding is nonspecific and may represent congenital lobar emphysema, trapped air secondary to endobronchial obstruction, or left-sided airspace disease. Recommend chest CT to better evaluate.   Prominent gas is present in the left upper quadrant, possibly in the colon and stomach but extraluminal air is not excluded. This can also be  evaluated on the above recommended chest CT.   MACRO: None   Signed by: Felix Devlin 10/6/2024 12:13 AM Dictation workstation:   [id number]      Assessment/Plan     29 y.o. male admitted on 10/5/2024 10:15 PM for acute hypoxic respiratory failure. He has a past history significant for cerebral palsy with spastic quadriplegia, PEG tube. Baclofen pump placement on 10/4/2024.  At baseline he is very interactive and enjoys singing; somnolent on admission. Usual state of health afterward with wet cough,copious secretions and mental decline beginning 10/5.  Baseline, no supplemental oxygen needs, however, upon ER triage he was hypoxic and was started on 6 L/min.  Labs on admission were notable for WBC count of 5.3, BNP of 24, negative SARS-CoV-2, influenza testing.  Imaging notable for bibasilar consolidations and debris within the trachea on CT.  Pulmonary embolism was ruled out.. He is currently receiving pip-tazo, azithromycin and also received a dose of gentamicin in the emergency room.    Assessment & Plan  Aspiration pneumonia due to anesthesia during labor and delivery, unspecified laterality, unspecified part of lung (HHS-HCC)  Most likely related to inability to maintain airway post-procedurally (baclofen pump on 10/4/24); CT with bibasilar opacities, right greater than left; Pro-Stalin 4.69; febrile and tachycardic    #Acute hypoxic respiratory failure: 2/2 aspiration pna, deconditioning; baseline is room air, required 6-15L, O2 needs are fluctuating, currently on 7L NC    Recommendations:  -Continue present antibiotics: zosyn   -Titrate O2 to maintain SpO2 90 to 92%.  Currently on 4 L/min, Pox 96-97%   -Continue DuoNebs every 6  -Bronchopulmonary hygiene with mechanical vest; NT suctioning Q6 as needed; frequent oral suctioning  -Increase steroids today to 40 mg twice daily   -Send sputum for culture if able - contaminated  -Strep and Legionella antigens both negative  -MRSA negative  -Follow-up blood cultures  (NGTD)    I spent 35 minutes in the professional and overall care of this patient.   Corinne Wood, APRN-CNS

## 2024-10-10 NOTE — PROGRESS NOTES
Physical Therapy                 Therapy Communication Note    Patient Name: Vargas Romero  MRN: [id number]  Department: [facility] [address]  Room: [address] [place] [address]  Today's Date: 10/10/2024     Discipline: Physical Therapy    Comment: PT screen. Per discussion with OT/RN. Pt is dependent with ADLs and trnasfers at baseline. Pt does not demonstrate any skilled acute PT needs at this time. Will plan to DC PT orders.

## 2024-10-10 NOTE — ASSESSMENT & PLAN NOTE
Acute Hypoxic Respiratory Failure  Sepsis due to aspiration pneumonia  - lactate elevation resolved. Urine antigens negative thus azithromycin stopped. MRSA PCR negative. Sputum cultures ordered (noted contamination), Blood cultures ngtd. Continue IV Zosyn. Start steroids per pulm recs, increased to twice daily  - See repeat chest x-ray above  - Given IV Lasix x 1 dose  -CT a/p done overnight 10/9; showing nonspecific edema which is likely postop. Significant worsening of diffuse consolidation in RML, RLL  - Pulm and RT consulted; will need aggressive pulmonary hygiene, vaishali nebs. NT Suctioning bid, Vest therapy.   - wean O2 for goal o2 sat 88-92%  - Mom concerned that symptoms are related to baclofen withdrawal or issues with the pump, orthopedic pain management surgeon was made aware however does not come to this hospital.  Seen by pulmonary who feels patient has improved overall.  Will continue to monitor.     Abnormal eye movements  -Nocturnist called as pt was looking toward his left often, mom concerned for stroke  -CT head negative  -Neuro consulted  -Recommend MRI brain, pending results     Sinus tachycardia  - due to above; treat underlying issues not HR  -500ml fluid bolus on 10/9, today giving IV Lasix due to increased congestion  - Monitor on telemetry     Cerebral palsy with spastic quadriplegia  Seizure disorder  - continue cannabidiol, onfi, vimpat, keppra  - admitted after baclofen pump placement     GERD  - continue ppi     HTN  - continue norvasc, metoprolol    Constipation  - Scheduled stool softeners and laxative  - As needed enema/suppositories           DVT Prophylaxis: subcutaneous Heparin     Disposition: continue to monitor inpatient, await consultant recommendations, await test results, and await clinical improvement

## 2024-10-10 NOTE — PROGRESS NOTES
Occupational Therapy                 Therapy Communication Note OT Screen and D/C    Patient Name: Vargas Romero  MRN: [id number]  Department: [facility]   Room: [address] [place] [address]  Today's Date: 10/10/2024     Discipline: Occupational Therapy    Missed Visit Reason: Missed Visit Reason: Other (Comment) OT screen completed, pt is dependent in ADLs and transfers at baseline. Spoke to RN, OT will be discontinued due to no skilled needs.

## 2024-10-10 NOTE — PROGRESS NOTES
10/10/24 0834   Discharge Planning   Expected Discharge Disposition Home H     Once med ready plan would be to discharge home with [facility], patient does live at home his mother is his primary caregiver, Currently ID following, Pulm following and Neuro consult placed. MRI brain pending for abnormal eye movements, PT/OT referral placed, I will continue to monitor for discharge planning, per notes patient is on 4L of 02 this morning at 97%.

## 2024-10-10 NOTE — PROGRESS NOTES
10/10/24 1209   Discharge Planning   Expected Discharge Disposition Home H     Met with patient's mom at bedside. Plan is for patient to return home with [facility]hc and parents. Mom states she has been patient's caregiver. Mom agreeable to caregiver support- group support, therapy, etc. SW will follow.

## 2024-10-11 LAB
ALBUMIN SERPL BCP-MCNC: 3.7 G/DL (ref 3.4–5)
ANION GAP SERPL CALC-SCNC: 15 MMOL/L (ref 10–20)
BASOPHILS # BLD AUTO: 0.02 X10*3/UL (ref 0–0.1)
BASOPHILS NFR BLD AUTO: 0.3 %
BUN SERPL-MCNC: 14 MG/DL (ref 6–23)
CALCIUM SERPL-MCNC: 9.1 MG/DL (ref 8.6–10.3)
CHLORIDE SERPL-SCNC: 99 MMOL/L (ref 98–107)
CO2 SERPL-SCNC: 31 MMOL/L (ref 21–32)
CREAT SERPL-MCNC: 0.55 MG/DL (ref 0.5–1.3)
EGFRCR SERPLBLD CKD-EPI 2021: >90 ML/MIN/1.73M*2
EOSINOPHIL # BLD AUTO: 0.06 X10*3/UL (ref 0–0.7)
EOSINOPHIL NFR BLD AUTO: 0.9 %
ERYTHROCYTE [DISTWIDTH] IN BLOOD BY AUTOMATED COUNT: 14.3 % (ref 11.5–14.5)
GLUCOSE BLD MANUAL STRIP-MCNC: 246 MG/DL (ref 74–99)
GLUCOSE SERPL-MCNC: 231 MG/DL (ref 74–99)
HCT VFR BLD AUTO: 38.2 % (ref 41–52)
HGB BLD-MCNC: 11.9 G/DL (ref 13.5–17.5)
IMM GRANULOCYTES # BLD AUTO: 0.12 X10*3/UL (ref 0–0.7)
IMM GRANULOCYTES NFR BLD AUTO: 1.7 % (ref 0–0.9)
LYMPHOCYTES # BLD AUTO: 0.71 X10*3/UL (ref 1.2–4.8)
LYMPHOCYTES NFR BLD AUTO: 10.2 %
MCH RBC QN AUTO: 26.2 PG (ref 26–34)
MCHC RBC AUTO-ENTMCNC: 31.2 G/DL (ref 32–36)
MCV RBC AUTO: 84 FL (ref 80–100)
MONOCYTES # BLD AUTO: 0.87 X10*3/UL (ref 0.1–1)
MONOCYTES NFR BLD AUTO: 12.5 %
NEUTROPHILS # BLD AUTO: 5.19 X10*3/UL (ref 1.2–7.7)
NEUTROPHILS NFR BLD AUTO: 74.4 %
NRBC BLD-RTO: 0 /100 WBCS (ref 0–0)
PHOSPHATE SERPL-MCNC: 2.3 MG/DL (ref 2.5–4.9)
PLATELET # BLD AUTO: 199 X10*3/UL (ref 150–450)
POTASSIUM SERPL-SCNC: 4.7 MMOL/L (ref 3.5–5.3)
RBC # BLD AUTO: 4.54 X10*6/UL (ref 4.5–5.9)
SODIUM SERPL-SCNC: 140 MMOL/L (ref 136–145)
WBC # BLD AUTO: 7 X10*3/UL (ref 4.4–11.3)

## 2024-10-11 PROCEDURE — 2500000004 HC RX 250 GENERAL PHARMACY W/ HCPCS (ALT 636 FOR OP/ED): Performed by: NURSE PRACTITIONER

## 2024-10-11 PROCEDURE — 99254 IP/OBS CNSLTJ NEW/EST MOD 60: CPT | Performed by: STUDENT IN AN ORGANIZED HEALTH CARE EDUCATION/TRAINING PROGRAM

## 2024-10-11 PROCEDURE — 2500000001 HC RX 250 WO HCPCS SELF ADMINISTERED DRUGS (ALT 637 FOR MEDICARE OP): Performed by: INTERNAL MEDICINE

## 2024-10-11 PROCEDURE — 82040 ASSAY OF SERUM ALBUMIN: CPT | Performed by: NURSE PRACTITIONER

## 2024-10-11 PROCEDURE — 1200000002 HC GENERAL ROOM WITH TELEMETRY DAILY

## 2024-10-11 PROCEDURE — 94669 MECHANICAL CHEST WALL OSCILL: CPT

## 2024-10-11 PROCEDURE — 99233 SBSQ HOSP IP/OBS HIGH 50: CPT | Performed by: INTERNAL MEDICINE

## 2024-10-11 PROCEDURE — 2500000001 HC RX 250 WO HCPCS SELF ADMINISTERED DRUGS (ALT 637 FOR MEDICARE OP)

## 2024-10-11 PROCEDURE — 99232 SBSQ HOSP IP/OBS MODERATE 35: CPT | Performed by: INTERNAL MEDICINE

## 2024-10-11 PROCEDURE — 2500000002 HC RX 250 W HCPCS SELF ADMINISTERED DRUGS (ALT 637 FOR MEDICARE OP, ALT 636 FOR OP/ED): Performed by: INTERNAL MEDICINE

## 2024-10-11 PROCEDURE — 2500000004 HC RX 250 GENERAL PHARMACY W/ HCPCS (ALT 636 FOR OP/ED): Performed by: INTERNAL MEDICINE

## 2024-10-11 PROCEDURE — 2500000005 HC RX 250 GENERAL PHARMACY W/O HCPCS: Performed by: HOSPITALIST

## 2024-10-11 PROCEDURE — 36415 COLL VENOUS BLD VENIPUNCTURE: CPT | Performed by: NURSE PRACTITIONER

## 2024-10-11 PROCEDURE — 2500000001 HC RX 250 WO HCPCS SELF ADMINISTERED DRUGS (ALT 637 FOR MEDICARE OP): Performed by: HOSPITALIST

## 2024-10-11 PROCEDURE — 82947 ASSAY GLUCOSE BLOOD QUANT: CPT

## 2024-10-11 PROCEDURE — 94640 AIRWAY INHALATION TREATMENT: CPT

## 2024-10-11 PROCEDURE — 94668 MNPJ CHEST WALL SBSQ: CPT

## 2024-10-11 PROCEDURE — 85025 COMPLETE CBC W/AUTO DIFF WBC: CPT | Performed by: NURSE PRACTITIONER

## 2024-10-11 RX ORDER — METOPROLOL TARTRATE 25 MG/1
25 TABLET, FILM COATED ORAL ONCE
Status: COMPLETED | OUTPATIENT
Start: 2024-10-11 | End: 2024-10-11

## 2024-10-11 RX ORDER — METOPROLOL TARTRATE 50 MG/1
50 TABLET ORAL 2 TIMES DAILY
Status: DISCONTINUED | OUTPATIENT
Start: 2024-10-11 | End: 2024-10-13

## 2024-10-11 RX ADMIN — AMLODIPINE BESYLATE 7.5 MG: 5 TABLET ORAL at 10:32

## 2024-10-11 RX ADMIN — PIPERACILLIN SODIUM AND TAZOBACTAM SODIUM 3.38 G: 3; .375 INJECTION, SOLUTION INTRAVENOUS at 22:37

## 2024-10-11 RX ADMIN — PREDNISOLONE SODIUM PHOSPHATE 40 MG: 15 SOLUTION ORAL at 10:35

## 2024-10-11 RX ADMIN — HEPARIN SODIUM 5000 UNITS: 5000 INJECTION INTRAVENOUS; SUBCUTANEOUS at 22:08

## 2024-10-11 RX ADMIN — ACETAMINOPHEN 650 MG: 650 LIQUID ORAL at 05:07

## 2024-10-11 RX ADMIN — HEPARIN SODIUM 5000 UNITS: 5000 INJECTION INTRAVENOUS; SUBCUTANEOUS at 06:01

## 2024-10-11 RX ADMIN — FOLIC ACID 1 MG: 1 TABLET ORAL at 10:33

## 2024-10-11 RX ADMIN — PIPERACILLIN SODIUM AND TAZOBACTAM SODIUM 3.38 G: 3; .375 INJECTION, SOLUTION INTRAVENOUS at 06:01

## 2024-10-11 RX ADMIN — IPRATROPIUM BROMIDE AND ALBUTEROL SULFATE 3 ML: 2.5; .5 SOLUTION RESPIRATORY (INHALATION) at 08:05

## 2024-10-11 RX ADMIN — HEPARIN SODIUM 5000 UNITS: 5000 INJECTION INTRAVENOUS; SUBCUTANEOUS at 14:57

## 2024-10-11 RX ADMIN — LACOSAMIDE 250 MG: 50 TABLET, FILM COATED ORAL at 10:33

## 2024-10-11 RX ADMIN — CYANOCOBALAMIN TAB 500 MCG 500 MCG: 500 TAB at 10:32

## 2024-10-11 RX ADMIN — IPRATROPIUM BROMIDE AND ALBUTEROL SULFATE 3 ML: 2.5; .5 SOLUTION RESPIRATORY (INHALATION) at 13:36

## 2024-10-11 RX ADMIN — LEVETIRACETAM 750 MG: 250 TABLET, FILM COATED ORAL at 10:32

## 2024-10-11 RX ADMIN — LACOSAMIDE 250 MG: 50 TABLET, FILM COATED ORAL at 22:07

## 2024-10-11 RX ADMIN — PIPERACILLIN SODIUM AND TAZOBACTAM SODIUM 3.38 G: 3; .375 INJECTION, SOLUTION INTRAVENOUS at 17:31

## 2024-10-11 RX ADMIN — SENNOSIDES 5 ML: 8.8 LIQUID ORAL at 22:09

## 2024-10-11 RX ADMIN — POLYETHYLENE GLYCOL 3350 17 G: 17 POWDER, FOR SOLUTION ORAL at 10:34

## 2024-10-11 RX ADMIN — PANTOPRAZOLE SODIUM 40 MG: 40 INJECTION, POWDER, FOR SOLUTION INTRAVENOUS at 06:01

## 2024-10-11 RX ADMIN — METOPROLOL TARTRATE 25 MG: 25 TABLET, FILM COATED ORAL at 10:34

## 2024-10-11 RX ADMIN — IPRATROPIUM BROMIDE AND ALBUTEROL SULFATE 3 ML: 2.5; .5 SOLUTION RESPIRATORY (INHALATION) at 00:25

## 2024-10-11 RX ADMIN — LEVETIRACETAM 750 MG: 250 TABLET, FILM COATED ORAL at 22:07

## 2024-10-11 RX ADMIN — DOCUSATE SODIUM 50 MG: 50 LIQUID ORAL at 10:31

## 2024-10-11 RX ADMIN — PIPERACILLIN SODIUM AND TAZOBACTAM SODIUM 3.38 G: 3; .375 INJECTION, SOLUTION INTRAVENOUS at 10:37

## 2024-10-11 RX ADMIN — DOCUSATE SODIUM 50 MG: 50 LIQUID ORAL at 22:17

## 2024-10-11 RX ADMIN — IPRATROPIUM BROMIDE AND ALBUTEROL SULFATE 3 ML: 2.5; .5 SOLUTION RESPIRATORY (INHALATION) at 19:22

## 2024-10-11 RX ADMIN — METOPROLOL TARTRATE 50 MG: 50 TABLET, FILM COATED ORAL at 22:08

## 2024-10-11 RX ADMIN — Medication 2 L/MIN: at 19:27

## 2024-10-11 RX ADMIN — ACETAMINOPHEN 650 MG: 650 LIQUID ORAL at 10:34

## 2024-10-11 RX ADMIN — PREDNISOLONE SODIUM PHOSPHATE 40 MG: 15 SOLUTION ORAL at 22:09

## 2024-10-11 RX ADMIN — CLOBAZAM 10 MG: 10 TABLET ORAL at 22:07

## 2024-10-11 RX ADMIN — METOPROLOL TARTRATE 25 MG: 25 TABLET, FILM COATED ORAL at 14:59

## 2024-10-11 ASSESSMENT — PAIN - FUNCTIONAL ASSESSMENT
PAIN_FUNCTIONAL_ASSESSMENT: UNABLE TO SELF-REPORT
PAIN_FUNCTIONAL_ASSESSMENT: UNABLE TO SELF-REPORT

## 2024-10-11 NOTE — PROGRESS NOTES
10/11/24 1620   Discharge Planning   Expected Discharge Disposition Home H     Spoke with patient's mom at bedside. Per mom, patient has had tf previously and has necessary equipment if patient discharges home on tube feeds. TCC/sw will follow.

## 2024-10-11 NOTE — ASSESSMENT & PLAN NOTE
Most likely related to inability to maintain airway post-procedurally (baclofen pump on 10/4/24); CT with bibasilar opacities, right greater than left; Pro-Stalin 4.69; febrile and tachycardic  29 YOM with h/o cerebral palsy with spastic quadriplegia, s/p PEG placement, who p/w productive cough associated with change in mental status. In the ED found to be hypoxic, requiring 6L NC. Chest imaging showed bibasilar consolidation and debris in trach. Admitted to [facility] with the diagnosis of aspiration pneumonia/pneumonitis.     Respiratory failure: acute with hypoxia. Due to aspiration pneumonia/pneumonitis +/- atelectasis. Overall is improving with need reducing to 2L      Continue supplemental O2, wean off as tolerates      Home O2 evaluation before DC      Bronchopulmonary hygiene with mechanical vest; NT suctioning Q6 as needed; frequent oral suctioning      Continue Duo-Neb      Management of the individual causes as below     Aspiration pneumonia:      Continue Zosyn      Aspiration precaution    Possible pneumonitis:      Continue Prednisone BID    DVT prophylaxis: subcutaneous heparin     Pulmonary will FU while in house.

## 2024-10-11 NOTE — PROGRESS NOTES
10/11/24 0754   Discharge Planning   Expected Discharge Disposition Home H     Once med ready plan is to discharge home with patient's mother, who is the primary caregiver, ID and Pulm following, patient currently on IV zosyn. Patient remains NPO and on Jevity 1.5 at 40ml/hr, if patient is dc home on TF will need to following up to make sure patient has all needed equipment at home for TF. I will continue to monitor for discharge planning.

## 2024-10-11 NOTE — PROGRESS NOTES
Vargas Romero is a 29 y.o. male on day 5 of admission presenting with Aspiration pneumonia due to anesthesia during labor and delivery, unspecified laterality, unspecified part of lung ([facility]-HCC).      Subjective   Patient was seen and examined at bedside with presence of his mother, was responsive to voice command, but nonverbal.  Mother requested enema, stated that she does saline enema at home, when patient constipated.       Objective     Last Recorded Vitals  /86   Pulse (!) 112   Temp 36.6 °C (97.8 °F) (Axillary)   Resp 20   Wt (!) 43.9 kg (96 lb 12.5 oz)   SpO2 96%   Intake/Output last 3 Shifts:    Intake/Output Summary (Last 24 hours) at 10/11/2024 1926  Last data filed at 10/10/2024 2255  Gross per 24 hour   Intake --   Output 700 ml   Net -700 ml       Admission Weight  Weight: (!) 44.5 kg (98 lb) (10/05/24 2218)    Daily Weight  10/06/24 : (!) 43.9 kg (96 lb 12.5 oz)    Image Results  MR brain w and wo IV contrast  Narrative: Interpreted By:  Gm Vang,   STUDY:  MR BRAIN W AND WO IV CONTRAST;  10/10/2024 2:55 pm      INDICATION:  Signs/Symptoms:abnormal eye movements, L gaze preference.  Cerebral  palsy.          COMPARISON:  10/08/2024 brain CT      ACCESSION NUMBER(S):  [id number]      ORDERING CLINICIAN:  MICAELA BARROS      TECHNIQUE:  Axial T2, FLAIR, DWI, gradient echo T2 and sagittal and coronal T1  weighted images of brain were acquired. Post contrast T1 weighted  images were acquired after administration of 8 mL gadoterate  (Dotarem) gadolinium based intravenous contrast.      FINDINGS:  CSF Spaces: The atria and bodies of the lateral ventricles are  dilated similar to the prior CT with foci of cystic encephalomalacia  along the corona radiata bilaterally and adjacent gliosis. A 1.5 cm  cyst is present in the region of the cavum velum interpositum.      Parenchyma: No acute infarct, hemorrhage or mass is noted. The brain  parenchyma enhances normally.      Paranasal Sinuses and  Mastoids: Visualized paranasal sinuses and  mastoid air cells are unremarkable.      Impression: No acute infarct, hemorrhage or mass is noted.      Chronic foci of cystic encephalomalacia are noted along the corona  radiata with adjacent gliosis as well.      MACRO:  None      Signed by: Gm Vang 10/11/2024 7:21 AM  Dictation workstation:   [id number]      Physical Exam  Constitutional: Intellectually disabled gentleman with cerebral palsy contractures of the extremities, nonverbal, but respond to voice command with opening of eyes, cooperative not in acute distress  Eyes: PERRLA, clear sclera  ENMT: Not examined  Head / Neck: Atraumatic, normocephalic, supple neck, JVP not visualized  Lungs: Patent airways, CTABL  Heart: RRR, S1S2, no murmurs appreciated, palpable pulses in all extremities  GI: Soft, NT, ND, bowel sounds present in all quadrants  MSK: Muscle atrophy with contractures in all extremities bilaterally, restriction in range of motion in all extremities  Extremities: Muscle atrophy with contractures in all extremities bilaterally, no peripheral edema  : No [place] catheter inserted  Breast: Deferred  Neurological: AAO x 3 to person, place and date, facial muscles symmetrical, sensation intact, strength 4/4, no acute focal neurological deficits appreciated  Psychological: Appropriate mood and behavior    Relevant Results           Scheduled medications  amLODIPine, 7.5 mg, oral, Daily  cannabidiol, 5 mg/kg, oral, BID  cloBAZam, 10 mg, oral, Nightly  cyanocobalamin, 500 mcg, oral, Daily  docusate sodium, 50 mg, oral, BID  ergocalciferol, 1,250 mcg, oral, Weekly  folic acid, 1 mg, oral, Daily  heparin (porcine), 5,000 Units, subcutaneous, q8h POPEYE  ipratropium-albuteroL, 3 mL, nebulization, q6h  lacosamide, 250 mg, oral, BID  levETIRAcetam, 750 mg, oral, BID  metoprolol tartrate, 50 mg, oral, BID  oxygen, , inhalation, Continuous - Inhalation  pantoprazole, 40 mg, oral, Daily before breakfast    Or  pantoprazole, 40 mg, intravenous, Daily before breakfast  piperacillin-tazobactam, 3.375 g, intravenous, q6h  polyethylene glycol, 17 g, oral, Daily  prednisoLONE, 40 mg, oral, BID  senna, 5 mL, oral, Nightly  sodium phosphates, 1 enema, rectal, Once      Continuous medications     PRN medications  PRN medications: acetaminophen, acetaminophen **OR** [DISCONTINUED] acetaminophen **OR** acetaminophen, bisacodyl, glycopyrrolate, ipratropium-albuteroL, ondansetron **OR** ondansetron, oxyCODONE      Assessment/Plan       29 y.o. male with a past medical history of cerebral palsy, spastic quadriplegia, hypertension, PEG tube, and severe intellectual disability who is s/p baclofen pump placement yesterday presenting to the ER and [facility] for altered mental status     Assessment & Plan  Aspiration pneumonia due to anesthesia during labor and delivery, unspecified laterality, unspecified part of lung ([facility]-H[facility])  Acute Hypoxic Respiratory Failure  Sepsis due to aspiration pneumonia  - lactate elevation resolved. Urine antigens negative thus azithromycin stopped. MRSA PCR negative. Sputum cultures ordered (noted contamination), Blood cultures ngtd. Continue IV Zosyn. Start steroids per pulm recs, increased to twice daily  - See repeat chest x-ray above  - Given IV Lasix x 1 dose  -CT a/p done overnight 10/9; showing nonspecific edema which is likely postop. Significant worsening of diffuse consolidation in RML, RLL  - Pulm and RT consulted; will need aggressive pulmonary hygiene, vaishali nebs. NT Suctioning bid, Vest therapy.   - wean O2 for goal o2 sat 88-92%  - Mom concerned that symptoms are related to baclofen withdrawal or issues with the pump, orthopedic pain management surgeon was made aware however does not come to this hospital.  Seen by pulmonary who feels patient has improved overall.  Will continue to monitor.  -Constipation: Soapsuds enema already ordered, trial of Fleet enema.  Mother uses saline  enema at home.     Abnormal eye movements  -Nocturnist called as pt was looking toward his left often, mom concerned for stroke  -CT head negative  -Neuro consulted: Appreciate evaluation recommendations  -Recommend MRI brain, pending results     Sinus tachycardia  - due to above; treat underlying issues not HR  -500ml fluid bolus on 10/9, today giving IV Lasix due to increased congestion  - Monitor on telemetry     Cerebral palsy with spastic quadriplegia  Seizure disorder  - continue cannabidiol, onfi, vimpat, keppra  - admitted after baclofen pump placement     GERD  - continue ppi     HTN  - continue norvasc, metoprolol    Constipation  - Scheduled stool softeners and laxative  - As needed enema/suppositories     DVT Prophylaxis: subcutaneous Heparin     Disposition: Presented with respiratory failure due to aspiration pneumonia, await consultant recommendations, await test results, and await clinical improvement           Octavio Chadwick, DO

## 2024-10-11 NOTE — PROGRESS NOTES
Vargas Romero is a 29 y.o. male on day 5 of admission presenting with Aspiration pneumonia due to anesthesia during labor and delivery, unspecified laterality, unspecified part of lung ([facility]-HCC).  Patient with h/o cerebral palsy with spastic quadriplegia, s/p PEG placement, who p/w productive cough associated with change in mental status. In the ED found to be hypoxic, requiring 6L NC. Chest imaging showed bibasilar consolidation and debris in trach. Admitted to [facility] with the diagnosis of aspiration pneumonia/pneumonitis.   Subjective   No acute overnight events. O2 requirements improved to 2L.      Patient is not interactive and cannot provide history.   Objective   Scheduled medications  amLODIPine, 7.5 mg, oral, Daily  cannabidiol, 5 mg/kg, oral, BID  cloBAZam, 10 mg, oral, Nightly  cyanocobalamin, 500 mcg, oral, Daily  docusate sodium, 50 mg, oral, BID  ergocalciferol, 1,250 mcg, oral, Weekly  folic acid, 1 mg, oral, Daily  heparin (porcine), 5,000 Units, subcutaneous, q8h POPEYE  ipratropium-albuteroL, 3 mL, nebulization, q6h  lacosamide, 250 mg, oral, BID  levETIRAcetam, 750 mg, oral, BID  metoprolol tartrate, 50 mg, oral, BID  oxygen, , inhalation, Continuous - Inhalation  pantoprazole, 40 mg, oral, Daily before breakfast   Or  pantoprazole, 40 mg, intravenous, Daily before breakfast  piperacillin-tazobactam, 3.375 g, intravenous, q6h  polyethylene glycol, 17 g, oral, Daily  prednisoLONE, 40 mg, oral, BID  senna, 5 mL, oral, Nightly  sodium phosphates, 1 enema, rectal, Once    Continuous medications     PRN medications  PRN medications: acetaminophen, acetaminophen **OR** [DISCONTINUED] acetaminophen **OR** acetaminophen, bisacodyl, glycopyrrolate, ipratropium-albuteroL, ondansetron **OR** ondansetron, oxyCODONE   Physical Exam  Constitutional:       General: He is not in acute distress.     Appearance: He is ill-appearing. He is not toxic-appearing.      Comments: Thin   HENT:      Head: Normocephalic and  "atraumatic.      Nose:      Comments: On 2L NC     Mouth/Throat:      Mouth: Mucous membranes are moist.   Eyes:      General: No scleral icterus.     Pupils: Pupils are equal, round, and reactive to light.   Cardiovascular:      Rate and Rhythm: Regular rhythm. Tachycardia present.      Heart sounds: No murmur heard.     No friction rub. No gallop.   Pulmonary:      Effort: Pulmonary effort is normal. No respiratory distress.      Breath sounds: Rales (bibasilar) present. No wheezing.   Abdominal:      General: Abdomen is flat. Bowel sounds are normal. There is no distension.      Palpations: Abdomen is soft.      Tenderness: There is no abdominal tenderness.   Musculoskeletal:      Cervical back: Neck supple. No rigidity.      Right lower leg: No edema.      Left lower leg: No edema.   Lymphadenopathy:      Cervical: No cervical adenopathy.   Skin:     General: Skin is warm and dry.      Coloration: Skin is not jaundiced.   Neurological:      Comments: Cannot fully assess, minimally interactive.    Psychiatric:      Comments: Cannot fully assess, minimally interactive.      Last Recorded Vitals  Blood pressure 141/86, pulse (!) 112, temperature 36.6 °C (97.8 °F), temperature source Axillary, resp. rate 20, height 1.6 m (5' 2.99\"), weight (!) 43.9 kg (96 lb 12.5 oz), SpO2 96%.  Intake/Output last 3 Shifts:  I/O last 3 completed shifts:  In: 650 (14.8 mL/kg) [NG/GT:200; IV Piggyback:450]  Out: 1300 (29.6 mL/kg) [Urine:1300 (0.8 mL/kg/hr)]  Weight: 43.9 kg   Relevant Results  Results for orders placed or performed during the hospital encounter of 10/05/24 (from the past 24 hour(s))   CBC and Auto Differential   Result Value Ref Range    WBC 7.0 4.4 - 11.3 x10*3/uL    nRBC 0.0 0.0 - 0.0 /100 WBCs    RBC 4.54 4.50 - 5.90 x10*6/uL    Hemoglobin 11.9 (L) 13.5 - 17.5 g/dL    Hematocrit 38.2 (L) 41.0 - 52.0 %    MCV 84 80 - 100 fL    MCH 26.2 26.0 - 34.0 pg    MCHC 31.2 (L) 32.0 - 36.0 g/dL    RDW 14.3 11.5 - 14.5 %    " Platelets 199 150 - 450 x10*3/uL    Neutrophils % 74.4 40.0 - 80.0 %    Immature Granulocytes %, Automated 1.7 (H) 0.0 - 0.9 %    Lymphocytes % 10.2 13.0 - 44.0 %    Monocytes % 12.5 2.0 - 10.0 %    Eosinophils % 0.9 0.0 - 6.0 %    Basophils % 0.3 0.0 - 2.0 %    Neutrophils Absolute 5.19 1.20 - 7.70 x10*3/uL    Immature Granulocytes Absolute, Automated 0.12 0.00 - 0.70 x10*3/uL    Lymphocytes Absolute 0.71 (L) 1.20 - 4.80 x10*3/uL    Monocytes Absolute 0.87 0.10 - 1.00 x10*3/uL    Eosinophils Absolute 0.06 0.00 - 0.70 x10*3/uL    Basophils Absolute 0.02 0.00 - 0.10 x10*3/uL   Renal Function Panel   Result Value Ref Range    Glucose 231 (H) 74 - 99 mg/dL    Sodium 140 136 - 145 mmol/L    Potassium 4.7 3.5 - 5.3 mmol/L    Chloride 99 98 - 107 mmol/L    Bicarbonate 31 21 - 32 mmol/L    Anion Gap 15 10 - 20 mmol/L    Urea Nitrogen 14 6 - 23 mg/dL    Creatinine 0.55 0.50 - 1.30 mg/dL    eGFR >90 >60 mL/min/1.73m*2    Calcium 9.1 8.6 - 10.3 mg/dL    Phosphorus 2.3 (L) 2.5 - 4.9 mg/dL    Albumin 3.7 3.4 - 5.0 g/dL   POCT GLUCOSE   Result Value Ref Range    POCT Glucose 246 (H) 74 - 99 mg/dL   MR brain w and wo IV contrast    Result Date: 10/11/2024  Interpreted By:  Gm Vang, STUDY: MR BRAIN W AND WO IV CONTRAST;  10/10/2024 2:55 pm   INDICATION: Signs/Symptoms:abnormal eye movements, L gaze preference.  Cerebral palsy.     COMPARISON: 10/08/2024 brain CT   ACCESSION NUMBER(S): [id number]   ORDERING CLINICIAN: MICAELA BARROS   TECHNIQUE: Axial T2, FLAIR, DWI, gradient echo T2 and sagittal and coronal T1 weighted images of brain were acquired. Post contrast T1 weighted images were acquired after administration of 8 mL gadoterate (Dotarem) gadolinium based intravenous contrast.   FINDINGS: CSF Spaces: The atria and bodies of the lateral ventricles are dilated similar to the prior CT with foci of cystic encephalomalacia along the corona radiata bilaterally and adjacent gliosis. A 1.5 cm cyst is present in the region of  the cavum velum interpositum.   Parenchyma: No acute infarct, hemorrhage or mass is noted. The brain parenchyma enhances normally.   Paranasal Sinuses and Mastoids: Visualized paranasal sinuses and mastoid air cells are unremarkable.       No acute infarct, hemorrhage or mass is noted.   Chronic foci of cystic encephalomalacia are noted along the corona radiata with adjacent gliosis as well.   MACRO: None   Signed by: Gm Vang 10/11/2024 7:21 AM Dictation workstation:   [id number]    XR chest 1 view    Result Date: 10/10/2024  Interpreted By:  Madiha Elias, STUDY: XR CHEST 1 VIEW;  10/10/2024 8:09 am   INDICATION: Signs/Symptoms:acute hypoxic respiratory failure, wax/waning worsening o2 req.     COMPARISON: 10/26/2024   ACCESSION NUMBER(S): [id number]   ORDERING CLINICIAN: MICAELA BARROS   FINDINGS: Artifact from overlying monitoring leads noted. Patient is rotated. Coarse bilateral infiltrates, increased in the right upper chest. No pleural effusion or pneumothorax. The cardiac silhouette is within normal limits for size.       Coarse bilateral infiltrates, increased in the right upper chest since 10/06/2024.   MACRO: None.   Signed by: Madiha Elias 10/10/2024 8:15 AM Dictation workstation:   [id number]    Assessment/Plan   Assessment & Plan  Aspiration pneumonia due to anesthesia during labor and delivery, unspecified laterality, unspecified part of lung ([facility]-HCC)  Most likely related to inability to maintain airway post-procedurally (baclofen pump on 10/4/24); CT with bibasilar opacities, right greater than left; Pro-Stalin 4.69; febrile and tachycardic  29 YOM with h/o cerebral palsy with spastic quadriplegia, s/p PEG placement, who p/w productive cough associated with change in mental status. In the ED found to be hypoxic, requiring 6L NC. Chest imaging showed bibasilar consolidation and debris in trach. Admitted to [facility] with the diagnosis of aspiration pneumonia/pneumonitis.     Respiratory failure:  acute with hypoxia. Due to aspiration pneumonia/pneumonitis +/- atelectasis. Overall is improving with need reducing to 2L      Continue supplemental O2, wean off as tolerates      Home O2 evaluation before DC      Bronchopulmonary hygiene with mechanical vest; NT suctioning Q6 as needed; frequent oral suctioning      Continue Duo-Neb      Management of the individual causes as below     Aspiration pneumonia:      Continue Zosyn      Aspiration precaution    Possible pneumonitis:      Continue Prednisone BID    DVT prophylaxis: subcutaneous heparin     Pulmonary will FU while in house.        Sherrill Peraza MD

## 2024-10-11 NOTE — PROGRESS NOTES
For follow-up of:  Aspiration pneumonia    Subjective   Interval History:  He is breathing comfortably on 2 L/min nasal cannula oxygen.  He is sleeping.  I spoke to his mother who is at the bedside.         Objective     Current Facility-Administered Medications   Medication Dose Route Frequency Provider Last Rate Last Admin    acetaminophen (Tylenol) oral liquid 650 mg  650 mg oral q4h PRN Ritu Baldwin PharmD   650 mg at 10/11/24 1034    acetaminophen (Tylenol) tablet 650 mg  650 mg oral q4h PRN Marcos G Salomone, DO   650 mg at 10/09/24 1111    Or    acetaminophen (Tylenol) suppository 650 mg  650 mg rectal q4h PRN Marcos G Salomone, DO   650 mg at 10/06/24 2046    amLODIPine (Norvasc) tablet 7.5 mg  7.5 mg oral Daily Marcos G Salomone, DO   7.5 mg at 10/11/24 1032    bisacodyl (Dulcolax) EC tablet 5 mg  5 mg oral Daily PRN Marcos G Salomone, DO        cannabidiol (Epidiolex) solution 220 mg--PATIENT OWN MEDICATION  5 mg/kg oral BID Tati White MD        cloBAZam (Onfi) tablet 10 mg  10 mg oral Nightly Marcos G Salomone, DO   10 mg at 10/10/24 2246    cyanocobalamin (Vitamin B-12) tablet 500 mcg  500 mcg oral Daily Marcos G Salomone, DO   500 mcg at 10/11/24 1032    docusate sodium (Colace) oral liquid 50 mg  50 mg oral BID Marcos G Salomone, DO   50 mg at 10/11/24 1031    ergocalciferol (Vitamin D-2) capsule 1,250 mcg  1,250 mcg oral Weekly Marcos G Salomone, DO   1,250 mcg at 10/06/24 1105    folic acid (Folvite) tablet 1 mg  1 mg oral Daily Marcos G Salomone, DO   1 mg at 10/11/24 1033    glycopyrrolate PF (Robinul) 0.2 mg/mL injection 0.2 mg  0.2 mg intravenous q4h PRN Tati White MD   0.2 mg at 10/09/24 1111    heparin (porcine) injection 5,000 Units  5,000 Units subcutaneous q8h POPEYE Marcos G Salomone, DO   5,000 Units at 10/11/24 1457    ipratropium-albuteroL (Duo-Neb) 0.5-2.5 mg/3 mL nebulizer solution 3 mL  3 mL nebulization q6h Marcos Mercado, DO   3 mL at 10/11/24 1336    ipratropium-albuteroL (Duo-Neb) 0.5-2.5  mg/3 mL nebulizer solution 3 mL  3 mL nebulization q2h PRN Marcos G Salomone, DO        lacosamide (Vimpat) tablet 250 mg  250 mg oral BID Deep Coronel, DO   250 mg at 10/11/24 1033    levETIRAcetam (Keppra) tablet 750 mg  750 mg oral BID Deep Coronel, DO   750 mg at 10/11/24 1032    metoprolol tartrate (Lopressor) tablet 50 mg  50 mg oral BID Octavioahima Goudiaby, DO        ondansetron (Zofran) tablet 4 mg  4 mg oral q8h PRN Marcos G Salomone, DO        Or    ondansetron (Zofran) injection 4 mg  4 mg intravenous q8h PRN Marcos G Salomone, DO        oxyCODONE (Roxicodone) immediate release tablet 5 mg  5 mg oral q6h PRN Marcos G Salomone, DO        oxygen (O2) therapy   inhalation Continuous - Inhalation Deep Coronel, DO   2 L/min at 10/10/24 2000    pantoprazole (ProtoNix) EC tablet 40 mg  40 mg oral Daily before breakfast Marcos G Salomone, DO   40 mg at 10/07/24 0600    Or    pantoprazole (ProtoNix) injection 40 mg  40 mg intravenous Daily before breakfast Marcos G Salomone, DO   40 mg at 10/11/24 0601    piperacillin-tazobactam (Zosyn) 3.375 g in dextrose (iso) IV 50 mL  3.375 g intravenous q6h Marcos G Salomone, DO   Stopped at 10/11/24 1148    polyethylene glycol (Glycolax, Miralax) packet 17 g  17 g oral Daily Nancy Sunshine, APRN-CNP   17 g at 10/11/24 1034    prednisoLONE sodium phosphate (OrapRED) oral solution 40 mg  40 mg oral BID Nancy Sunshine, APRN-CNP   40 mg at 10/11/24 1035    senna (Senokot) 8.8 mg/5 mL syrup 5 mL  5 mL oral Nightly Marcos G Salomone, DO   5 mL at 10/10/24 2250    sodium phosphates (Fleets) 19-7 gram/118 mL enema 1 enema  1 enema rectal Once Octavio Goudiaby, DO            Last Recorded Vitals  BP (!) 187/118 (BP Location: Left arm, Patient Position: Lying)   Pulse (!) 112   Temp 36.6 °C (97.8 °F) (Axillary)   Resp 20   Wt (!) 43.9 kg (96 lb 12.5 oz)   SpO2 96%   General: no acute distress, lying in bed, sleeping, mother at bedside  HEENT: pharynx not examined  CVS: RRR  Resp: decreased in the  bases, wearing nasal cannula oxygen  ABD: soft, NT, ND, PEG  : External urinary catheter  EXT: no edema  Skin: Right lower quadrant surgical site    Relevant Results:    Labs:   Results from last 72 hours   Lab Units 10/11/24  0515 10/10/24  0516 10/09/24  0627   SODIUM mmol/L 140 140 140   POTASSIUM mmol/L 4.7 3.3* 3.4*   CHLORIDE mmol/L 99 99 100   BUN mg/dL 14 11 11   CREATININE mg/dL 0.55 0.46* 0.49*   MAGNESIUM mg/dL  --  2.10  --    PHOSPHORUS mg/dL 2.3* 2.1* 1.0*     Results from last 72 hours   Lab Units 10/11/24  0515 10/10/24  0516 10/09/24  0627   WBC AUTO x10*3/uL 7.0 7.6 9.8   HEMOGLOBIN g/dL 11.9* 10.7* 12.1*   HEMATOCRIT % 38.2* 34.0* 38.5*   PLATELETS AUTO x10*3/uL 199 181 160       Microbiology data: I have personally and independently reviewed and intrepreted the lab results  10/5/2024 blood culture show no growth  10/6/2024 MRSA swab negative  10/6/2024 urine Legionella antigen negative; urine pneumococcal antigen negative  10/8/2024 MRSA swab negative  10/8/2024 sputum culture was a poor sample contaminated with saliva    Imaging data: I have personally and independently reviewed and interpreted the imaging studies  10/5/2024 chest x-ray shows lung infiltrates  10/5/2024 head CT shows no acute issues  10/5/2024 CT chest abdomen pelvis shows right more than left infiltrates with debris in the trachea; there is soft tissue gas around the newly inserted pump  10/6/2024 chest x-ray shows bilateral infiltrates  10/8/2024 CT head shows no acute issues  10/8/2024 CT abdomen pelvis shows postoperative fluid at the baclofen pump; there is increased and more confluent right middle lobe and right lower lobe infiltrate; there is fluid distention of the bowel with constipation  10/10/2024 MRI brain showed no acute issues    Assessment/Plan     MY IMPRESSION & RECOMMENDATIONS:  Aspiration pneumonia, for which he remains on IV antibiotics.  I have personally and independently reviewed and interpreted the  laboratory tests, imaging studies, and the documentation from other healthcare providers.  I am monitoring for side effects from Zosyn as outlined in the previous note.  His acute hypoxic respiratory failure is being managed with oxygen supplementation.  When he is ready for discharge, my plan will be to let him go on Augmentin through his PEG tube.    -Continue Zosyn for now     Other issues:  #Acute metabolic encephalopathy, which we are monitoring  #Cerebral palsy with spastic quadriplegia status post PEG and baclofen pump  #Seizure disorder  #GERD  #Hypertension       Micah Padilla MD

## 2024-10-11 NOTE — ASSESSMENT & PLAN NOTE
Acute Hypoxic Respiratory Failure  Sepsis due to aspiration pneumonia  - lactate elevation resolved. Urine antigens negative thus azithromycin stopped. MRSA PCR negative. Sputum cultures ordered (noted contamination), Blood cultures ngtd. Continue IV Zosyn. Start steroids per pulm recs, increased to twice daily  - See repeat chest x-ray above  - Given IV Lasix x 1 dose  -CT a/p done overnight 10/9; showing nonspecific edema which is likely postop. Significant worsening of diffuse consolidation in RML, RLL  - Pulm and RT consulted; will need aggressive pulmonary hygiene, vaishali nebs. NT Suctioning bid, Vest therapy.   - wean O2 for goal o2 sat 88-92%  - Mom concerned that symptoms are related to baclofen withdrawal or issues with the pump, orthopedic pain management surgeon was made aware however does not come to this hospital.  Seen by pulmonary who feels patient has improved overall.  Will continue to monitor.  -Constipation: Soapsuds enema already ordered, trial of Fleet enema.  Mother uses saline enema at home.     Abnormal eye movements  -Nocturnist called as pt was looking toward his left often, mom concerned for stroke  -CT head negative  -Neuro consulted: Appreciate evaluation recommendations  -Recommend MRI brain, pending results     Sinus tachycardia  - due to above; treat underlying issues not HR  -500ml fluid bolus on 10/9, today giving IV Lasix due to increased congestion  - Monitor on telemetry     Cerebral palsy with spastic quadriplegia  Seizure disorder  - continue cannabidiol, onfi, vimpat, keppra  - admitted after baclofen pump placement     GERD  - continue ppi     HTN  - continue norvasc, metoprolol    Constipation  - Scheduled stool softeners and laxative  - As needed enema/suppositories     DVT Prophylaxis: subcutaneous Heparin     Disposition: Presented with respiratory failure due to aspiration pneumonia, await consultant recommendations, await test results, and await clinical improvement

## 2024-10-11 NOTE — CONSULTS
Inpatient consult to Neurology  Consult performed by: Aasef G Shaikh, MD PhD  Consult ordered by: Adan Coronel DO          History Of Present Illness  Vargas Romero is a 29 y.o. male presenting with sepsis.  The patient is known for his past medical history of cerebral palsy, spastic quadriplegia, hypertension, PEG tube, severe intellectual deficit with status post baclofen for spasticity pump placement.  Came to ER for altered mental status.  He was unable to provide his own history which is his baseline since he is nonverbal.  According to his family he was found to be less interactive than usual and was hypoxic with needing high flow of oxygen.  He does have a history of seizures but no seizures were described this time.  Head CT showed patchy right greater than left lower lobe consolidation of the lung compatible with pneumonia.  Neurology was consulted for further input especially in setting of having roving eye movements which were described by his mother.  When I saw him he was accompanied by his mother but was nonverbal my history comes from his mother as noted above as well as from HPI and medical records..  Past Medical History  Past Medical History:   Diagnosis Date    Allergic rhinitis     Dysphagia     Essential hypertension     GERD (gastroesophageal reflux disease)     Intractable seizure disorder (Multi)     Malnutrition (Multi)     PEG (percutaneous endoscopic gastrostomy) status (Multi)     Seasonal allergies     Severe intellectual disability      Surgical History  Past Surgical History:   Procedure Laterality Date    OTHER SURGICAL HISTORY      Orchiectomy    OTHER SURGICAL HISTORY      Placement of baclofen pump    OTHER SURGICAL HISTORY      Tendon release    OTHER SURGICAL HISTORY      PEG     Social History  Social History     Tobacco Use    Smoking status: Never    Smokeless tobacco: Never   Substance Use Topics    Alcohol use: Never    Drug use: Never     Allergies  Patient has no known  allergies.  Medications Prior to Admission   Medication Sig Dispense Refill Last Dose    acetaminophen (TylenoL) 325 mg tablet Take 2 tablets (650 mg) by mouth every 6 hours if needed for mild pain (1 - 3). 30 tablet 0     amLODIPine (Norvasc) 5 mg tablet Take 1.5 tablets (7.5 mg) by mouth once daily.       baclofen (Gablofen) 1,000 mcg/mL intrathecal injection by intrathecal route continuously. Pump just changed Thursday 10/3/24       baclofen (Lioresal) 20 mg tablet Take 1 tablet (20 mg) by mouth 3 times a day. Only when pump alarms.       bisacodyl (Dulcolax) 5 mg EC tablet Take 1 tablet (5 mg) by mouth once daily as needed for constipation. Do not crush, chew, or split.       cannabidiol (Epidiolex) 100 mg/mL solution Take by mouth.       cloBAZam (Onfi) 20 mg tablet Take 0.5 tablets (10 mg) by mouth once daily at bedtime.       diazePAM (Valtoco) 20 mg/2 spray spray,non-aerosol nasal spray Administer 1 spray into each nostril 1 time if needed for seizures.       docusate sodium (Colace) 50 mg/5 mL oral liquid Take 5 mL (50 mg) by mouth once daily.       ergocalciferol (Vitamin D-2) 1.25 MG (77287 UT) capsule Take 1 capsule (1,250 mcg) by mouth 1 (one) time per week. On Thursday   10/3/2024    folic acid (Folvite) 1 mg tablet Take by mouth once daily.       ipratropium-albuteroL (Duo-Neb) 0.5-2.5 mg/3 mL nebulizer solution Take 3 mL by nebulization every 6 hours.       lacosamide (Vimpat) 200 mg tablet tablet Take 1 tablet (200 mg) by mouth 2 times a day. Plus 50 mg ( 250 mg total )       lacosamide (Vimpat) 50 mg tablet Take 1 tablet (50 mg) by mouth 2 times a day. Plus 200 mg (250 mg total)       levETIRAcetam (Keppra) 750 mg tablet Take 1 tablet (750 mg) by mouth 2 times a day. Decreased dose       metoprolol tartrate (Lopressor) 25 mg tablet Take 1 tablet (25 mg) by mouth 2 times a day.       multivitamin tablet Take 1 tablet by mouth once daily.       oxyCODONE (Roxicodone) 5 mg immediate release tablet  "Take 1 tablet (5 mg) by mouth every 6 hours if needed for severe pain (7 - 10). (Patient not taking: Reported on 10/8/2024) 15 tablet 0 Not Taking    polyethylene glycol (Glycolax, Miralax) 17 gram packet Take 17 g by mouth every other day.   10/3/2024    senna (Senokot) 8.8 mg/5 mL syrup Take by mouth once daily at bedtime.          Review of Systems as noted in HPI.  Neurological Exam  Alert but does not follow commands.  Face appears symmetric however there is instability in the gaze.  There are slow eye movements sometimes associated with quick jerking nystagmus.  These are more consistent with instability in gaze.  No typical phenomenology of any particular types of nystagmus noted no other clonus.  No ocular flutter.  He does try to move his eyes to make following eye movements..  No facial involuntary movements noted.  Power could not be assessed.  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  This is his baseline according to his mother.  Some response in formalin per my sister noxious but sensory exam is overall unable to obtain.  No involuntary limb movements noted.    Last Recorded Vitals  Blood pressure 141/86, pulse (!) 112, temperature 36.6 °C (97.8 °F), temperature source Axillary, resp. rate 20, height 1.6 m (5' 2.99\"), weight (!) 43.9 kg (96 lb 12.5 oz), SpO2 96%.    Relevant Results            Jacy Coma Scale  Best Eye Response: To verbal stimuli  Best Verbal Response: None  Best Motor Response: Withdraws to pain  Jacy Coma Scale Score: 8                 I have personally reviewed the following imaging results MR brain w and wo IV contrast    Result Date: 10/11/2024  Interpreted By:  Gm Vang, STUDY: MR BRAIN W AND WO IV CONTRAST;  10/10/2024 2:55 pm   INDICATION: Signs/Symptoms:abnormal eye movements, L gaze preference.  Cerebral palsy.     COMPARISON: 10/08/2024 brain CT   ACCESSION NUMBER(S): [id number]   ORDERING CLINICIAN: " MICAELA BARROS   TECHNIQUE: Axial T2, FLAIR, DWI, gradient echo T2 and sagittal and coronal T1 weighted images of brain were acquired. Post contrast T1 weighted images were acquired after administration of 8 mL gadoterate (Dotarem) gadolinium based intravenous contrast.   FINDINGS: CSF Spaces: The atria and bodies of the lateral ventricles are dilated similar to the prior CT with foci of cystic encephalomalacia along the corona radiata bilaterally and adjacent gliosis. A 1.5 cm cyst is present in the region of the cavum velum interpositum.   Parenchyma: No acute infarct, hemorrhage or mass is noted. The brain parenchyma enhances normally.   Paranasal Sinuses and Mastoids: Visualized paranasal sinuses and mastoid air cells are unremarkable.       No acute infarct, hemorrhage or mass is noted.   Chronic foci of cystic encephalomalacia are noted along the corona radiata with adjacent gliosis as well.   MACRO: None   Signed by: Gm Vang 10/11/2024 7:21 AM Dictation workstation:   [id number]    XR chest 1 view    Result Date: 10/10/2024  Interpreted By:  Madiha Elias, STUDY: XR CHEST 1 VIEW;  10/10/2024 8:09 am   INDICATION: Signs/Symptoms:acute hypoxic respiratory failure, wax/waning worsening o2 req.     COMPARISON: 10/26/2024   ACCESSION NUMBER(S): [id number]   ORDERING CLINICIAN: MICAELA BARROS   FINDINGS: Artifact from overlying monitoring leads noted. Patient is rotated. Coarse bilateral infiltrates, increased in the right upper chest. No pleural effusion or pneumothorax. The cardiac silhouette is within normal limits for size.       Coarse bilateral infiltrates, increased in the right upper chest since 10/06/2024.   MACRO: None.   Signed by: Madiha Elias 10/10/2024 8:15 AM Dictation workstation:   [id number]    CT abdomen pelvis w IV contrast    Result Date: 10/8/2024  Interpreted By:  Rafi Morrell, STUDY: CT ABDOMEN PELVIS W IV CONTRAST;  10/8/2024 9:41 pm   INDICATION: Signs/Symptoms:fullness R  side around baclofen pump. 29 y.o. male with a past medical history of cerebral palsy, spastic quadriplegia, hypertension, PEG tube, and severe intellectual disability who is s/p baclofen pump     COMPARISON: None.   ACCESSION NUMBER(S): [id number]   ORDERING CLINICIAN: KENNETH MCADAMS   TECHNIQUE: Contiguous axial images of the abdomen and pelvis were obtained after the intravenous administration of iodinated contrast. Coronal and sagittal reformatted images were reconstructed from the axial data.   FINDINGS: LOWER CHEST: Compared to CT 10/06/2024 there is significant worsening of now confluent diffuse consolidation within the entirety of the visualized right middle lobe and right lower lobe. There is also worsening of consolidation throughout the lingula and left lower lobe that is becoming more confluent and diffuse as well with background ground-glass opacity. The airways are clear. However, there is visualization of gastroesophageal reflux into the distal esophagus.     ABDOMEN/PELVIS:   ABDOMINAL WALL: There is confluent edema surrounding the baclofen pump which extends distally to the right groin and months pubis and right lateral proximal thigh and slightly superiorly to the level of the mid abdominal wall. There is no evidence of a discrete loculated fluid collection. There has been resolution of previous abdominal wall air from recent placement. The catheter wiring is intact without focal kinking. It enters the spinal canal at L2-L3 and courses cranially to the lower thoracic spine.   LIVER: There is redemonstration of a lobulated enhancing structure that is isodense with the portal and hepatic venous blood pool and demonstrates a portal vein entering and hepatic vein exiting the area of enhancement; additionally, the right hepatic vein is more densely opacified in the left hepatic vein, consistent with a portal venous/hepatic venous shunt.   BILE DUCTS: No significant intrahepatic or extrahepatic dilatation.    GALLBLADDER: No significant abnormality.   PANCREAS: No significant abnormality.   SPLEEN: No significant abnormality.   ADRENALS: No significant abnormality.   KIDNEYS, URETERS, BLADDER: No significant abnormality.   REPRODUCTIVE ORGANS: No significant abnormality.   VESSELS: Intrahepatic portal venous-hepatic venous shunt as described above within segment 7.   RETROPERITONEUM/LYMPH NODES: No acute retroperitoneal abnormality. No enlarged lymph nodes.   BOWEL/MESENTERY/PERITONEUM: There is a percutaneous gastrostomy tube. The stomach is nondistended. There is a large amount of impacted stool in the rectum which is distended to 8.2 cm x 5.6 cm similar to prior study. There is scattered fluid distended small bowel loops in a nonobstructive pattern. There is also liquid stool throughout the ascending and transverse colon which is new from prior study.   No ascites, free air, or fluid collection.     MUSCULOSKELETAL: No acute osseous abnormality. No suspicious osseous lesion. There is redemonstration of dysplastic left hip with resection of femoral head and neck and irregular deformity of the proximal femur. The radiolucent tract from intramedullary femoral nail within the left proximal femur. 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 The bones are osteopenic. There is a broad levoconvex lumbar scoliosis.       1. Confluent edema surrounding the baclofen pump which extends distally to the right groin and months pubis and right lateral proximal thigh and slightly superiorly to the level of the mid abdominal wall. There is no evidence of a discrete loculated fluid collection. There has been resolution of previous abdominal wall air from recent placement. This edema is nonspecific in the immediate postoperative context and given that it was present immediately after placement is most likely postoperative in nature. Continued clinical follow-up recommended to ensure resolution.   2. Compared to CT 10/06/2024 there is significant  worsening of now confluent diffuse consolidation within the entirety of the visualized right middle lobe and right lower lobe. There is also worsening of consolidation throughout the lingula and left lower lobe that is becoming more confluent and diffuse as well with background ground-glass opacity. There is visualization of gastroesophageal reflux into the distal esophagus. Differential diagnosis includes worsening pneumonia, developing diffuse alveolar damage superimposed upon pneumonia or, call pneumonitis from recent aspiration though less likely given clear airways.   3. Worsening fluid distention of small and large bowel that could be related to developing enteritis/colitis.   4. Large amount of impacted stool in the rectum which is distended up to 8.2 cm similar prior study. Consider disimpaction to avoid complication of stercoral colitis.   5. Redemonstration of intrahepatic portal venous-hepatic venous shunt segment 7.   MACRO: None.   Signed by: Rafi Morrell 10/8/2024 10:12 PM Dictation workstation:   [id number]    CT head wo IV contrast    Result Date: 10/8/2024  Interpreted By:  Rafi Morrell, STUDY: CT HEAD WO IV CONTRAST;  10/8/2024 9:41 pm   INDICATION: Signs/Symptoms:abnormal eye movements.     COMPARISON: 10/06/2024   ACCESSION NUMBER(S): [id number]   ORDERING CLINICIAN: KENNETH MCADAMS   TECHNIQUE: Noncontrast axial CT images of head were obtained with coronal and sagittal reconstructed images.   FINDINGS: BRAIN PARENCHYMA: There is redemonstration of diffuse age disproportionate cerebral volume loss with predominant white matter atrophy and generalized periventricular subcortical gliosis. No acute intraparenchymal hemorrhage or parenchymal evidence of acute large territory ischemic infarct. Gray-white matter distinction is preserved. No mass-effect.   VENTRICLES and EXTRA-AXIAL SPACES:  No acute extra-axial or intraventricular hemorrhage. No effacement of cerebral sulci. The ventricles and  sulci are disproportionately prominent for age due to volume loss.   PARANASAL SINUSES/MASTOIDS: Mild mucosal thickening in the sphenoid sinuses. No hemorrhage or air-fluid levels within the visualized paranasal sinuses. The mastoids are well aerated.   CALVARIUM/ORBITS:  No skull fracture.  The orbits and globes are intact to the extent visualized.   EXTRACRANIAL SOFT TISSUES: No discernible abnormality.       No acute intracranial abnormality.   Redemonstration of age disproportionate supratentorial volume loss and white matter gliosis, most likely reflecting diffuse remote cerebral insult.   MACRO: None.   Signed by: Rafi Morrell 10/8/2024 10:01 PM Dictation workstation:   [id number]    ECG 12 lead    Result Date: 10/7/2024  Sinus tachycardia Moderate voltage criteria for LVH, may be normal variant Borderline ECG When compared with ECG of 05-OCT-2024 22:21, (unconfirmed) No significant change was found Confirmed by Lauri Solis ([id number]) on 10/7/2024 10:36:40 AM    ECG 12 Lead    Result Date: 10/7/2024  Normal sinus rhythm Septal infarct , age undetermined Abnormal ECG No previous ECGs available See ED provider note for full interpretation and clinical correlation Confirmed by Radha Vásquez ([id number]) on 10/7/2024 10:11:58 AM    XR chest 1 view    Result Date: 10/6/2024  Interpreted By:  Anthony Silva, STUDY: XR CHEST 1 VIEW;  10/6/2024 6:39 pm   INDICATION: Signs/Symptoms:hypoxia.     COMPARISON: 10/05/2024   ACCESSION NUMBER(S): [id number]   ORDERING CLINICIAN: DEEP MCADAMS   FINDINGS:   There is multifocal airspace disease in the lungs predominantly at the right lung base, worsened from the prior. Multifocal pneumonia versus aspiration pneumonitis in the differential. No large effusion seen. No pneumothorax. The cardiac silhouette is within normal limits for size.       1. Extensive bilateral multifocal airspace disease worse in the right lung base. Underlying pneumonia and aspiration pneumonitis in  the differential       MACRO: None   Signed by: Anthony Silva 10/6/2024 6:42 PM Dictation workstation:   [id number]    CT angio chest for pulmonary embolism    Result Date: 10/6/2024  Interpreted By:  Timothy Quinn, STUDY: CT ANGIO CHEST FOR PULMONARY EMBOLISM; CT ABDOMEN PELVIS W IV CONTRAST;  10/6/2024 12:37 am   INDICATION: Signs/Symptoms:ams, hypoxia; Signs/Symptoms:recent baclofen pump placement, ams   COMPARISON: None.   ACCESSION NUMBER(S): [id number]; [id number]   ORDERING CLINICIAN: LAYA LA   TECHNIQUE: CT of the chest, abdomen, and pelvis was performed. Contiguous axial images were obtained through the chest, abdomen and pelvis. Coronal and sagittal reconstructions were performed. Intravenous contrast was administered, 75 mL Omnipaque 350. Chest images were acquired in the pulmonary angiographic phase. MIP/3D reconstructions were performed on a separate workstation and provided for interpretation.   FINDINGS: CHEST:   LOWER NECK AND CHEST WALL:  Within normal limits.   MEDIASTINUM/TAVARES:No lymphadenopathy. Esophagus is unremarkable.   CARDIOVASCULAR:  Cardiac chamber size within normal limits. No pericardial effusion.  Aortic caliber normal. Normal caliber of main pulmonary artery. No pulmonary embolism. Motion artifact limits evaluation of subsegmental pulmonary arteries.   LUNGS, AIRWAYS, AND PLEURA:  Patchy right-greater-than-left lower lobe consolidative and ground-glass opacities. Debris within the dependent trachea.   MUSCULOSKELETAL: No acute osseous abnormality or suspicious osseous lesions.  Intraspinal medication catheter in place.       ABDOMEN:   LIVER: Within normal limits.   BILE DUCTS: Normal caliber.   GALLBLADDER: No calcified stones. No wall thickening.   PANCREAS: Within normal limits.   SPLEEN: Within normal limits.   ADRENALS: Within normal limits.   KIDNEYS, URETERS, and BLADDER: Early contrast excretion limits evaluation for small renal calculi. No  hydronephrosis.  Ureters are non-dilated. Urinary bladder within normal limits.   REPRODUCTIVE: No pelvic masses.   VESSELS: Aorta and IVC appear normal.   RETROPERITONEUM and LYMPH NODES: No lymphadenopathy.   BOWEL: Gastrostomy tube in place. Small bowel is non-dilated. Normal appendix. Large bowel is normal.   PERITONEUM: No ascites or free air. No fluid collection.   BODY WALL: Right lower quadrant subcutaneous medication pump in place; metallic streak artifact limits evaluation of adjacent structures. There is soft tissue edema and soft tissue gas surrounding the pump compatible with recent placement. No distinct organized fluid collection is identified.   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 Thoracolumbar dextrocurvature. No acute osseous abnormality.         1. Patchy right-greater-than-left lower lobe consolidative and ground-glass opacities compatible with pneumonia. Debris within the dependent trachea raising concern for aspiration. 2. No pulmonary embolism. Motion artifact limits evaluation of subsegmental pulmonary arteries. 3. Right lower quadrant subcutaneous medication pump in place; metallic streak artifact limits evaluation of adjacent structures. There is soft tissue edema and soft tissue gas surrounding the pump compatible with recent placement. No distinct organized fluid collection is identified.     Signed by: Timothy Quinn 10/6/2024 1:27 AM Dictation workstation:   [id number]    CT abdomen pelvis w IV contrast    Result Date: 10/6/2024  Interpreted By:  Timothy Quinn, STUDY: CT ANGIO CHEST FOR PULMONARY EMBOLISM; CT ABDOMEN PELVIS W IV CONTRAST;  10/6/2024 12:37 am   INDICATION: Signs/Symptoms:ams, hypoxia; Signs/Symptoms:recent baclofen pump placement, ams   COMPARISON: None.   ACCESSION NUMBER(S): [id number]; [id number]   ORDERING CLINICIAN: LAYA LA   TECHNIQUE: CT of the chest,  abdomen, and pelvis was performed. Contiguous axial images were obtained through the chest, abdomen and pelvis. Coronal and sagittal reconstructions were performed. Intravenous contrast was administered, 75 mL Omnipaque 350. Chest images were acquired in the pulmonary angiographic phase. MIP/3D reconstructions were performed on a separate workstation and provided for interpretation.   FINDINGS: CHEST:   LOWER NECK AND CHEST WALL:  Within normal limits.   MEDIASTINUM/TAVARES:No lymphadenopathy. Esophagus is unremarkable.   CARDIOVASCULAR:  Cardiac chamber size within normal limits. No pericardial effusion.  Aortic caliber normal. Normal caliber of main pulmonary artery. No pulmonary embolism. Motion artifact limits evaluation of subsegmental pulmonary arteries.   LUNGS, AIRWAYS, AND PLEURA:  Patchy right-greater-than-left lower lobe consolidative and ground-glass opacities. Debris within the dependent trachea.   MUSCULOSKELETAL: No acute osseous abnormality or suspicious osseous lesions.  Intraspinal medication catheter in place.       ABDOMEN:   LIVER: Within normal limits.   BILE DUCTS: Normal caliber.   GALLBLADDER: No calcified stones. No wall thickening.   PANCREAS: Within normal limits.   SPLEEN: Within normal limits.   ADRENALS: Within normal limits.   KIDNEYS, URETERS, and BLADDER: Early contrast excretion limits evaluation for small renal calculi. No hydronephrosis.  Ureters are non-dilated. Urinary bladder within normal limits.   REPRODUCTIVE: No pelvic masses.   VESSELS: Aorta and IVC appear normal.   RETROPERITONEUM and LYMPH NODES: No lymphadenopathy.   BOWEL: Gastrostomy tube in place. Small bowel is non-dilated. Normal appendix. Large bowel is normal.   PERITONEUM: No ascites or free air. No fluid collection.   BODY WALL: Right lower quadrant subcutaneous medication pump in place; metallic streak artifact limits evaluation of adjacent structures. There is soft tissue edema and soft tissue gas surrounding  the pump compatible with recent placement. No distinct organized fluid collection is identified.   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 Thoracolumbar dextrocurvature. No acute osseous abnormality.         1. Patchy right-greater-than-left lower lobe consolidative and ground-glass opacities compatible with pneumonia. Debris within the dependent trachea raising concern for aspiration. 2. No pulmonary embolism. Motion artifact limits evaluation of subsegmental pulmonary arteries. 3. Right lower quadrant subcutaneous medication pump in place; metallic streak artifact limits evaluation of adjacent structures. There is soft tissue edema and soft tissue gas surrounding the pump compatible with recent placement. No distinct organized fluid collection is identified.     Signed by: Timothy Quinn 10/6/2024 1:27 AM Dictation workstation:   [id number]    CT head wo IV contrast    Result Date: 10/6/2024  Interpreted By:  Timothy Quinn, STUDY: CT HEAD WO IV CONTRAST;  10/6/2024 12:37 am   INDICATION: Signs/Symptoms:ams.   COMPARISON: None.   ACCESSION NUMBER(S): [id number]   ORDERING CLINICIAN: LAYA LA   TECHNIQUE: Axial noncontrast CT images of the head. Sagittal and coronal reformats were provided.   FINDINGS: BRAIN: No acute intracranial hemorrhage. No mass effect or midline shift. Gray-white matter interfaces are preserved. Patchy hypoattenuation of the white matter which is favored to be artifactual.   VENTRICLES and EXTRA-AXIAL SPACES: Prominent ventricles and extra-axial CSF spaces, reflecting parenchymal volume loss.   EXTRACRANIAL SOFT TISSUES:  Within normal limits.   PARANASAL SINUSES/MASTOIDS: The visualized paranasal sinuses and mastoid air cells are aerated.   BONES AND ORBITS: No displaced skull fracture. Orbits are within normal limits.   OTHER FINDINGS: None.       1. No acute intracranial hemorrhage or mass  effect. 2. Prominent ventricles and extra-axial CSF spaces, reflecting parenchymal volume loss. 3. Patchy hypoattenuation of the white matter which is favored to be artifactual.   Signed by: Timothy Quinn 10/6/2024 1:13 AM Dictation workstation:   [id number]    XR chest 1 view    Result Date: 10/6/2024  Interpreted By:  Felix Devlin, STUDY: XR CHEST 1 VIEW;  10/5/2024 11:25 pm   INDICATION: Signs/Symptoms:hypoxia.   COMPARISON: None.   ACCESSION NUMBER(S): [id number]   ORDERING CLINICIAN: LAYA LA   FINDINGS:     CARDIOMEDIASTINAL SILHOUETTE: Rightward mediastinal shift.   LUNGS: The right lung is asymmetrically lucent compared to the left. No definite pleural effusion or pneumothorax.   ABDOMEN: Gas in the left upper quadrant, possibly in the stomach, but extraluminal air is not excluded.   BONES: No acute osseous abnormality.       The right lung is asymmetrically lucent compared to the left. This finding is nonspecific and may represent congenital lobar emphysema, trapped air secondary to endobronchial obstruction, or left-sided airspace disease. Recommend chest CT to better evaluate.   Prominent gas is present in the left upper quadrant, possibly in the colon and stomach but extraluminal air is not excluded. This can also be evaluated on the above recommended chest CT.   MACRO: None   Signed by: Felix Devlin 10/6/2024 12:13 AM Dictation workstation:   [id number]  .      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  He does have roving eye movements which are disconjugate which are more consistent with that expected and cerebral palsy.  I suspect that they are partially compensated in his case but given pneumonia there is decompensation and they are bit worse compared to baseline.  I do not see any obvious pattern of nystagmus opsoclonus or any saccadic restrictions  that could account for any acute neurological problem besides decompensation of his cerebral palsy.  Suggest treatment of pneumonia.  If this persist then would get further imaging, given baclofen pump it may be difficult to receive an MRI at his end.  I spent 60 minutes in the professional and overall care of this patient.      Aasef G Shaikh, MD PhD

## 2024-10-11 NOTE — CARE PLAN
The patient's goals for the shift include      The clinical goals for the shift include pt will have decreased respiratory secretions during shift

## 2024-10-12 LAB
ALBUMIN SERPL BCP-MCNC: 3.5 G/DL (ref 3.4–5)
ANION GAP SERPL CALC-SCNC: 14 MMOL/L (ref 10–20)
BASOPHILS # BLD AUTO: 0.04 X10*3/UL (ref 0–0.1)
BASOPHILS NFR BLD AUTO: 0.4 %
BUN SERPL-MCNC: 16 MG/DL (ref 6–23)
CALCIUM SERPL-MCNC: 9.1 MG/DL (ref 8.6–10.3)
CHLORIDE SERPL-SCNC: 98 MMOL/L (ref 98–107)
CO2 SERPL-SCNC: 32 MMOL/L (ref 21–32)
CREAT SERPL-MCNC: 0.45 MG/DL (ref 0.5–1.3)
EGFRCR SERPLBLD CKD-EPI 2021: >90 ML/MIN/1.73M*2
EOSINOPHIL # BLD AUTO: 0 X10*3/UL (ref 0–0.7)
EOSINOPHIL NFR BLD AUTO: 0 %
ERYTHROCYTE [DISTWIDTH] IN BLOOD BY AUTOMATED COUNT: 14.3 % (ref 11.5–14.5)
GLUCOSE BLD MANUAL STRIP-MCNC: 280 MG/DL (ref 74–99)
GLUCOSE SERPL-MCNC: 268 MG/DL (ref 74–99)
HCT VFR BLD AUTO: 41 % (ref 41–52)
HGB BLD-MCNC: 12.4 G/DL (ref 13.5–17.5)
IMM GRANULOCYTES # BLD AUTO: 0.28 X10*3/UL (ref 0–0.7)
IMM GRANULOCYTES NFR BLD AUTO: 2.7 % (ref 0–0.9)
LYMPHOCYTES # BLD AUTO: 1.14 X10*3/UL (ref 1.2–4.8)
LYMPHOCYTES NFR BLD AUTO: 11 %
MCH RBC QN AUTO: 25.8 PG (ref 26–34)
MCHC RBC AUTO-ENTMCNC: 30.2 G/DL (ref 32–36)
MCV RBC AUTO: 85 FL (ref 80–100)
MONOCYTES # BLD AUTO: 0.92 X10*3/UL (ref 0.1–1)
MONOCYTES NFR BLD AUTO: 8.8 %
NEUTROPHILS # BLD AUTO: 8.02 X10*3/UL (ref 1.2–7.7)
NEUTROPHILS NFR BLD AUTO: 77.1 %
NRBC BLD-RTO: 0 /100 WBCS (ref 0–0)
PHOSPHATE SERPL-MCNC: 2.3 MG/DL (ref 2.5–4.9)
PLATELET # BLD AUTO: 312 X10*3/UL (ref 150–450)
POTASSIUM SERPL-SCNC: 4.5 MMOL/L (ref 3.5–5.3)
RBC # BLD AUTO: 4.81 X10*6/UL (ref 4.5–5.9)
SODIUM SERPL-SCNC: 139 MMOL/L (ref 136–145)
WBC # BLD AUTO: 10.4 X10*3/UL (ref 4.4–11.3)

## 2024-10-12 PROCEDURE — 84100 ASSAY OF PHOSPHORUS: CPT | Performed by: NURSE PRACTITIONER

## 2024-10-12 PROCEDURE — 2500000002 HC RX 250 W HCPCS SELF ADMINISTERED DRUGS (ALT 637 FOR MEDICARE OP, ALT 636 FOR OP/ED): Performed by: INTERNAL MEDICINE

## 2024-10-12 PROCEDURE — 94640 AIRWAY INHALATION TREATMENT: CPT

## 2024-10-12 PROCEDURE — 85025 COMPLETE CBC W/AUTO DIFF WBC: CPT | Performed by: NURSE PRACTITIONER

## 2024-10-12 PROCEDURE — 2500000004 HC RX 250 GENERAL PHARMACY W/ HCPCS (ALT 636 FOR OP/ED): Performed by: NURSE PRACTITIONER

## 2024-10-12 PROCEDURE — 36415 COLL VENOUS BLD VENIPUNCTURE: CPT | Performed by: NURSE PRACTITIONER

## 2024-10-12 PROCEDURE — 2500000001 HC RX 250 WO HCPCS SELF ADMINISTERED DRUGS (ALT 637 FOR MEDICARE OP): Performed by: INTERNAL MEDICINE

## 2024-10-12 PROCEDURE — 2500000004 HC RX 250 GENERAL PHARMACY W/ HCPCS (ALT 636 FOR OP/ED): Performed by: INTERNAL MEDICINE

## 2024-10-12 PROCEDURE — 94669 MECHANICAL CHEST WALL OSCILL: CPT

## 2024-10-12 PROCEDURE — 99232 SBSQ HOSP IP/OBS MODERATE 35: CPT | Performed by: INTERNAL MEDICINE

## 2024-10-12 PROCEDURE — 94668 MNPJ CHEST WALL SBSQ: CPT

## 2024-10-12 PROCEDURE — 82947 ASSAY GLUCOSE BLOOD QUANT: CPT

## 2024-10-12 PROCEDURE — 99233 SBSQ HOSP IP/OBS HIGH 50: CPT | Performed by: INTERNAL MEDICINE

## 2024-10-12 PROCEDURE — 2500000001 HC RX 250 WO HCPCS SELF ADMINISTERED DRUGS (ALT 637 FOR MEDICARE OP)

## 2024-10-12 PROCEDURE — 2500000005 HC RX 250 GENERAL PHARMACY W/O HCPCS: Performed by: HOSPITALIST

## 2024-10-12 PROCEDURE — 94664 DEMO&/EVAL PT USE INHALER: CPT

## 2024-10-12 PROCEDURE — 1200000002 HC GENERAL ROOM WITH TELEMETRY DAILY

## 2024-10-12 PROCEDURE — 2500000001 HC RX 250 WO HCPCS SELF ADMINISTERED DRUGS (ALT 637 FOR MEDICARE OP): Performed by: HOSPITALIST

## 2024-10-12 RX ORDER — METOPROLOL TARTRATE 1 MG/ML
5 INJECTION, SOLUTION INTRAVENOUS ONCE
Status: COMPLETED | OUTPATIENT
Start: 2024-10-12 | End: 2024-10-12

## 2024-10-12 RX ADMIN — IPRATROPIUM BROMIDE AND ALBUTEROL SULFATE 3 ML: 2.5; .5 SOLUTION RESPIRATORY (INHALATION) at 07:57

## 2024-10-12 RX ADMIN — PREDNISOLONE SODIUM PHOSPHATE 40 MG: 15 SOLUTION ORAL at 08:25

## 2024-10-12 RX ADMIN — METOPROLOL TARTRATE 50 MG: 50 TABLET, FILM COATED ORAL at 21:33

## 2024-10-12 RX ADMIN — PIPERACILLIN SODIUM AND TAZOBACTAM SODIUM 3.38 G: 3; .375 INJECTION, SOLUTION INTRAVENOUS at 04:59

## 2024-10-12 RX ADMIN — CANNABIDIOL 220 MG: 100 SOLUTION ORAL at 22:08

## 2024-10-12 RX ADMIN — METOPROLOL TARTRATE 50 MG: 50 TABLET, FILM COATED ORAL at 08:24

## 2024-10-12 RX ADMIN — Medication 2 L/MIN: at 07:57

## 2024-10-12 RX ADMIN — LACOSAMIDE 250 MG: 50 TABLET, FILM COATED ORAL at 08:24

## 2024-10-12 RX ADMIN — IPRATROPIUM BROMIDE AND ALBUTEROL SULFATE 3 ML: 2.5; .5 SOLUTION RESPIRATORY (INHALATION) at 13:43

## 2024-10-12 RX ADMIN — IPRATROPIUM BROMIDE AND ALBUTEROL SULFATE 3 ML: 2.5; .5 SOLUTION RESPIRATORY (INHALATION) at 19:36

## 2024-10-12 RX ADMIN — POLYETHYLENE GLYCOL 3350 17 G: 17 POWDER, FOR SOLUTION ORAL at 08:25

## 2024-10-12 RX ADMIN — ACETAMINOPHEN 650 MG: 650 LIQUID ORAL at 21:45

## 2024-10-12 RX ADMIN — LEVETIRACETAM 750 MG: 250 TABLET, FILM COATED ORAL at 21:33

## 2024-10-12 RX ADMIN — ACETAMINOPHEN 650 MG: 650 LIQUID ORAL at 04:59

## 2024-10-12 RX ADMIN — PIPERACILLIN SODIUM AND TAZOBACTAM SODIUM 3.38 G: 3; .375 INJECTION, SOLUTION INTRAVENOUS at 12:11

## 2024-10-12 RX ADMIN — CLOBAZAM 10 MG: 10 TABLET ORAL at 21:33

## 2024-10-12 RX ADMIN — CYANOCOBALAMIN TAB 500 MCG 500 MCG: 500 TAB at 08:24

## 2024-10-12 RX ADMIN — PANTOPRAZOLE SODIUM 40 MG: 40 INJECTION, POWDER, FOR SOLUTION INTRAVENOUS at 06:29

## 2024-10-12 RX ADMIN — SENNOSIDES 5 ML: 8.8 LIQUID ORAL at 21:34

## 2024-10-12 RX ADMIN — AMLODIPINE BESYLATE 7.5 MG: 5 TABLET ORAL at 08:24

## 2024-10-12 RX ADMIN — HEPARIN SODIUM 5000 UNITS: 5000 INJECTION INTRAVENOUS; SUBCUTANEOUS at 21:32

## 2024-10-12 RX ADMIN — PREDNISOLONE SODIUM PHOSPHATE 40 MG: 15 SOLUTION ORAL at 21:34

## 2024-10-12 RX ADMIN — PIPERACILLIN SODIUM AND TAZOBACTAM SODIUM 3.38 G: 3; .375 INJECTION, SOLUTION INTRAVENOUS at 21:32

## 2024-10-12 RX ADMIN — LACOSAMIDE 250 MG: 50 TABLET, FILM COATED ORAL at 21:45

## 2024-10-12 RX ADMIN — FOLIC ACID 1 MG: 1 TABLET ORAL at 08:24

## 2024-10-12 RX ADMIN — DOCUSATE SODIUM 50 MG: 50 LIQUID ORAL at 21:32

## 2024-10-12 RX ADMIN — DOCUSATE SODIUM 50 MG: 50 LIQUID ORAL at 08:23

## 2024-10-12 RX ADMIN — HEPARIN SODIUM 5000 UNITS: 5000 INJECTION INTRAVENOUS; SUBCUTANEOUS at 13:47

## 2024-10-12 RX ADMIN — Medication 2 L/MIN: at 20:00

## 2024-10-12 RX ADMIN — IPRATROPIUM BROMIDE AND ALBUTEROL SULFATE 3 ML: 2.5; .5 SOLUTION RESPIRATORY (INHALATION) at 00:35

## 2024-10-12 RX ADMIN — METOPROLOL TARTRATE 5 MG: 5 INJECTION INTRAVENOUS at 16:53

## 2024-10-12 RX ADMIN — HEPARIN SODIUM 5000 UNITS: 5000 INJECTION INTRAVENOUS; SUBCUTANEOUS at 05:00

## 2024-10-12 RX ADMIN — LEVETIRACETAM 750 MG: 250 TABLET, FILM COATED ORAL at 08:24

## 2024-10-12 ASSESSMENT — PAIN - FUNCTIONAL ASSESSMENT: PAIN_FUNCTIONAL_ASSESSMENT: UNABLE TO SELF-REPORT

## 2024-10-12 ASSESSMENT — COGNITIVE AND FUNCTIONAL STATUS - GENERAL
DRESSING REGULAR UPPER BODY CLOTHING: TOTAL
TURNING FROM BACK TO SIDE WHILE IN FLAT BAD: TOTAL
HELP NEEDED FOR BATHING: TOTAL
STANDING UP FROM CHAIR USING ARMS: TOTAL
PERSONAL GROOMING: TOTAL
DRESSING REGULAR UPPER BODY CLOTHING: TOTAL
WALKING IN HOSPITAL ROOM: TOTAL
EATING MEALS: TOTAL
PERSONAL GROOMING: TOTAL
STANDING UP FROM CHAIR USING ARMS: TOTAL
MOVING FROM LYING ON BACK TO SITTING ON SIDE OF FLAT BED WITH BEDRAILS: TOTAL
PERSONAL GROOMING: TOTAL
DAILY ACTIVITIY SCORE: 6
CLIMB 3 TO 5 STEPS WITH RAILING: TOTAL
CLIMB 3 TO 5 STEPS WITH RAILING: TOTAL
STANDING UP FROM CHAIR USING ARMS: TOTAL
HELP NEEDED FOR BATHING: TOTAL
MOVING TO AND FROM BED TO CHAIR: TOTAL
TURNING FROM BACK TO SIDE WHILE IN FLAT BAD: TOTAL
MOVING TO AND FROM BED TO CHAIR: TOTAL
DRESSING REGULAR LOWER BODY CLOTHING: TOTAL
MOBILITY SCORE: 6
TOILETING: TOTAL
DRESSING REGULAR LOWER BODY CLOTHING: TOTAL
MOVING FROM LYING ON BACK TO SITTING ON SIDE OF FLAT BED WITH BEDRAILS: TOTAL
DAILY ACTIVITIY SCORE: 6
WALKING IN HOSPITAL ROOM: TOTAL
TURNING FROM BACK TO SIDE WHILE IN FLAT BAD: TOTAL
DAILY ACTIVITIY SCORE: 6
TOILETING: TOTAL
MOBILITY SCORE: 6
EATING MEALS: TOTAL
DRESSING REGULAR UPPER BODY CLOTHING: TOTAL
MOVING TO AND FROM BED TO CHAIR: TOTAL
MOBILITY SCORE: 6
DRESSING REGULAR LOWER BODY CLOTHING: TOTAL
CLIMB 3 TO 5 STEPS WITH RAILING: TOTAL
TOILETING: TOTAL
WALKING IN HOSPITAL ROOM: TOTAL
MOVING FROM LYING ON BACK TO SITTING ON SIDE OF FLAT BED WITH BEDRAILS: TOTAL
HELP NEEDED FOR BATHING: TOTAL
EATING MEALS: TOTAL

## 2024-10-12 ASSESSMENT — PAIN SCALES - WONG BAKER
WONGBAKER_NUMERICALRESPONSE: HURTS LITTLE BIT
WONGBAKER_NUMERICALRESPONSE: NO HURT
WONGBAKER_NUMERICALRESPONSE: NO HURT
WONGBAKER_NUMERICALRESPONSE: HURTS LITTLE BIT
WONGBAKER_NUMERICALRESPONSE: NO HURT
WONGBAKER_NUMERICALRESPONSE: HURTS LITTLE BIT

## 2024-10-12 ASSESSMENT — PAIN SCALES - PAIN ASSESSMENT IN ADVANCED DEMENTIA (PAINAD)
TOTALSCORE: MEDICATION (SEE MAR)
TOTALSCORE: SLEEPING

## 2024-10-12 NOTE — ASSESSMENT & PLAN NOTE
Most likely related to inability to maintain airway post-procedurally (baclofen pump on 10/4/24); CT with bibasilar opacities, right greater than left; Pro-Stalin 4.69; febrile and tachycardic  29 YOM with h/o cerebral palsy with spastic quadriplegia, s/p PEG placement, who p/w productive cough associated with change in mental status. In the ED found to be hypoxic, requiring 6L NC. Chest imaging showed bibasilar consolidation and debris in trach. Admitted to [facility] with the diagnosis of aspiration pneumonia/pneumonitis.     Respiratory failure: acute with hypoxia. Due to aspiration pneumonia/pneumonitis +/- atelectasis. Overall is improving with need reducing to 2L      Continue supplemental O2, wean off as tolerates      Home O2 evaluation before DC      Bronchopulmonary hygiene with mechanical vest; NT suctioning Q6 as needed; frequent oral suctioning      Continue Duo-Neb      Management of the individual causes as below     Aspiration pneumonia:      Continue Zosyn      Aspiration precaution    Possible pneumonitis:      Continue Prednisone BID    DVT prophylaxis: subcutaneous heparin     Pulmonary will FU while in house.

## 2024-10-12 NOTE — ASSESSMENT & PLAN NOTE
Acute Hypoxic Respiratory Failure  Sepsis due to aspiration pneumonia  - lactate elevation resolved. Urine antigens negative thus azithromycin stopped. MRSA PCR negative. Sputum cultures ordered (noted contamination), Blood cultures ngtd. Continue IV Zosyn. Start steroids per pulm recs, increased to twice daily  - s/p IV Lasix x 1 dose  -CT a/p done overnight 10/9; showing nonspecific edema which is likely postop. Significant worsening of diffuse consolidation in RML, RLL  - Pulm and RT consulted; will need aggressive pulmonary hygiene, vaishali nebs. NT Suctioning bid, Vest therapy.   - wean O2 for goal o2 sat 88-92%  - Mom concerned that symptoms are related to baclofen withdrawal or issues with the pump, orthopedic pain management surgeon was made aware however does not come to this hospital.  Seen by pulmonary who feels patient has improved overall.  Will continue to monitor.  -Constipation: Soapsuds enema already ordered, trial of Fleet enema.  Mother uses saline enema at home.     Abnormal eye movements  -Nocturnist called as pt was looking toward his left often, mom concerned for stroke  -CT head negative  -Neuro consulted: Appreciate evaluation recommendations  -Recommend MRI brain, resulted negative for intracranial process     Sinus tachycardia  -Improved with change of metoprolol to 50 twice daily  - due to above; treat underlying issues not HR  - Monitor on telemetry     Cerebral palsy with spastic quadriplegia  Seizure disorder  - continue cannabidiol, onfi, vimpat keppra  - admitted after baclofen pump placement     GERD  - continue ppi     HTN: Elevated today  -Acceptable systolic, but elevated diastolic, Lopressor 5 mg IV push x 1 given  - continue norvasc, metoprolol ( increased to 50 twice daily)    Constipation  - Scheduled stool softeners and laxative  - As needed enema/suppositories     DVT Prophylaxis: subcutaneous Heparin     Disposition: Presented with respiratory failure due to aspiration  pneumonia, await consultant recommendations, await test results, and await clinical improvement

## 2024-10-12 NOTE — PROGRESS NOTES
Vargas Romero is a 29 y.o. male on day 6 of admission presenting with Aspiration pneumonia due to anesthesia during labor and delivery, unspecified laterality, unspecified part of lung (HHS-HCC).      Subjective   Patient was seen and examined bedside this morning, no family member at that time, did not respond to verbal command, but his eyes was kept open.  Patient experienced episode of elevated BP with borderline acceptable systolic, and elevated diastolic.       Objective     Last Recorded Vitals  BP (!) 147/107 (BP Location: Right arm, Patient Position: Lying) Comment: nurse notified of bp  Pulse 78   Temp 36.8 °C (98.2 °F) (Temporal)   Resp 18   Wt (!) 43.9 kg (96 lb 12.5 oz)   SpO2 100%   Intake/Output last 3 Shifts:    Intake/Output Summary (Last 24 hours) at 10/12/2024 1657  Last data filed at 10/12/2024 0322  Gross per 24 hour   Intake --   Output 650 ml   Net -650 ml       Admission Weight  Weight: (!) 44.5 kg (98 lb) (10/05/24 2218)    Daily Weight  10/06/24 : (!) 43.9 kg (96 lb 12.5 oz)    Image Results  MR brain w and wo IV contrast  Narrative: Interpreted By:  Gm Vang,   STUDY:  MR BRAIN W AND WO IV CONTRAST;  10/10/2024 2:55 pm      INDICATION:  Signs/Symptoms:abnormal eye movements, L gaze preference.  Cerebral  palsy.          COMPARISON:  10/08/2024 brain CT      ACCESSION NUMBER(S):  [id number]      ORDERING CLINICIAN:  MICAELA BARROS      TECHNIQUE:  Axial T2, FLAIR, DWI, gradient echo T2 and sagittal and coronal T1  weighted images of brain were acquired. Post contrast T1 weighted  images were acquired after administration of 8 mL gadoterate  (Dotarem) gadolinium based intravenous contrast.      FINDINGS:  CSF Spaces: The atria and bodies of the lateral ventricles are  dilated similar to the prior CT with foci of cystic encephalomalacia  along the corona radiata bilaterally and adjacent gliosis. A 1.5 cm  cyst is present in the region of the cavum velum interpositum.       Parenchyma: No acute infarct, hemorrhage or mass is noted. The brain  parenchyma enhances normally.      Paranasal Sinuses and Mastoids: Visualized paranasal sinuses and  mastoid air cells are unremarkable.      Impression: No acute infarct, hemorrhage or mass is noted.      Chronic foci of cystic encephalomalacia are noted along the corona  radiata with adjacent gliosis as well.      MACRO:  None      Signed by: Gm Vang 10/11/2024 7:21 AM  Dictation workstation:   [id number]      Physical Exam  Constitutional: Intellectually disabled gentleman with cerebral palsy contractures of the extremities, nonverbal, but respond to voice command with opening of eyes, cooperative not in acute distress  Eyes: PERRLA, clear sclera  ENMT: Not examined  Head / Neck: Atraumatic, normocephalic, supple neck, JVP not visualized  Lungs: Patent airways, CTABL  Heart: RRR, S1S2, no murmurs appreciated, palpable pulses in all extremities  GI: Soft, NT, ND, bowel sounds present in all quadrants  MSK: Muscle atrophy with contractures in all extremities bilaterally, restriction in range of motion in all extremities  Extremities: Muscle atrophy with contractures in all extremities bilaterally, no peripheral edema  : No [place] catheter inserted  Breast: Deferred  Neurological: AAO x 3 to person, place and date, facial muscles symmetrical, sensation intact, strength 4/4, no acute focal neurological deficits appreciated  Psychological: Appropriate mood and behavior    Relevant Results           Scheduled medications  amLODIPine, 7.5 mg, oral, Daily  cannabidiol, 5 mg/kg, oral, BID  cloBAZam, 10 mg, oral, Nightly  cyanocobalamin, 500 mcg, oral, Daily  docusate sodium, 50 mg, oral, BID  ergocalciferol, 1,250 mcg, oral, Weekly  folic acid, 1 mg, oral, Daily  heparin (porcine), 5,000 Units, subcutaneous, q8h POPEYE  ipratropium-albuteroL, 3 mL, nebulization, q6h  lacosamide, 250 mg, oral, BID  levETIRAcetam, 750 mg, oral, BID  metoprolol  tartrate, 50 mg, oral, BID  oxygen, , inhalation, Continuous - Inhalation  pantoprazole, 40 mg, oral, Daily before breakfast   Or  pantoprazole, 40 mg, intravenous, Daily before breakfast  piperacillin-tazobactam, 3.375 g, intravenous, q6h  polyethylene glycol, 17 g, oral, Daily  prednisoLONE, 40 mg, oral, BID  senna, 5 mL, oral, Nightly  sodium phosphates, 1 enema, rectal, Once      Continuous medications     PRN medications  PRN medications: acetaminophen, acetaminophen **OR** [DISCONTINUED] acetaminophen **OR** acetaminophen, bisacodyl, glycopyrrolate, ipratropium-albuteroL, ondansetron **OR** ondansetron, oxyCODONE      Assessment/Plan      29 y.o. male with a past medical history of cerebral palsy, spastic quadriplegia, hypertension, PEG tube, and severe intellectual disability who is s/p baclofen pump placement yesterday presenting to the ER and [facility] for altered mental status     Assessment & Plan  Aspiration pneumonia due to anesthesia during labor and delivery, unspecified laterality, unspecified part of lung (HHS-HCC)  Acute Hypoxic Respiratory Failure  Sepsis due to aspiration pneumonia  - lactate elevation resolved. Urine antigens negative thus azithromycin stopped. MRSA PCR negative. Sputum cultures ordered (noted contamination), Blood cultures ngtd. Continue IV Zosyn. Start steroids per pulm recs, increased to twice daily  - s/p IV Lasix x 1 dose  -CT a/p done overnight 10/9; showing nonspecific edema which is likely postop. Significant worsening of diffuse consolidation in RML, RLL  - Pulm and RT consulted; will need aggressive pulmonary hygiene, vaishali nebs. NT Suctioning bid, Vest therapy.   - wean O2 for goal o2 sat 88-92%  - Mom concerned that symptoms are related to baclofen withdrawal or issues with the pump, orthopedic pain management surgeon was made aware however does not come to this hospital.  Seen by pulmonary who feels patient has improved overall.  Will continue to  monitor.  -Constipation: Soapsuds enema already ordered, trial of Fleet enema.  Mother uses saline enema at home.     Abnormal eye movements  -Nocturnist called as pt was looking toward his left often, mom concerned for stroke  -CT head negative  -Neuro consulted: Appreciate evaluation recommendations  -Recommend MRI brain, resulted negative for intracranial process     Sinus tachycardia  -Improved with change of metoprolol to 50 twice daily  - due to above; treat underlying issues not HR  - Monitor on telemetry     Cerebral palsy with spastic quadriplegia  Seizure disorder  - continue cannabidiol, onfi, vimpat, keppra  - admitted after baclofen pump placement     GERD  - continue ppi     HTN: Elevated today  -Acceptable systolic, but elevated diastolic, Lopressor 5 mg IV push x 1 given  - continue norvasc, metoprolol ( increased to 50 twice daily)    Constipation  - Scheduled stool softeners and laxative  - As needed enema/suppositories     DVT Prophylaxis: subcutaneous Heparin     Disposition: Presented with respiratory failure due to aspiration pneumonia, await consultant recommendations, await test results, and await clinical improvement            Octavio Chadwick,

## 2024-10-12 NOTE — PROGRESS NOTES
Vargas Romero is a 29 y.o. male on day 6 of admission presenting with Aspiration pneumonia due to anesthesia during labor and delivery, unspecified laterality, unspecified part of lung (HHS-HCC).  Patient with h/o cerebral palsy with spastic quadriplegia, s/p PEG placement, who p/w productive cough associated with change in mental status. In the ED found to be hypoxic, requiring 6L NC. Chest imaging showed bibasilar consolidation and debris in trach. Admitted to [facility] with the diagnosis of aspiration pneumonia/pneumonitis.   Subjective   No acute overnight events. O2 requirements stable at 2L.      More awake today, tracks with eyes, but cannot provide history.   Objective   Scheduled medications  amLODIPine, 7.5 mg, oral, Daily  cannabidiol, 5 mg/kg, oral, BID  cloBAZam, 10 mg, oral, Nightly  cyanocobalamin, 500 mcg, oral, Daily  docusate sodium, 50 mg, oral, BID  ergocalciferol, 1,250 mcg, oral, Weekly  folic acid, 1 mg, oral, Daily  heparin (porcine), 5,000 Units, subcutaneous, q8h POPEYE  ipratropium-albuteroL, 3 mL, nebulization, q6h  lacosamide, 250 mg, oral, BID  levETIRAcetam, 750 mg, oral, BID  metoprolol tartrate, 50 mg, oral, BID  oxygen, , inhalation, Continuous - Inhalation  pantoprazole, 40 mg, oral, Daily before breakfast   Or  pantoprazole, 40 mg, intravenous, Daily before breakfast  piperacillin-tazobactam, 3.375 g, intravenous, q6h  polyethylene glycol, 17 g, oral, Daily  prednisoLONE, 40 mg, oral, BID  senna, 5 mL, oral, Nightly  sodium phosphates, 1 enema, rectal, Once    Continuous medications     PRN medications  PRN medications: acetaminophen, acetaminophen **OR** [DISCONTINUED] acetaminophen **OR** acetaminophen, bisacodyl, glycopyrrolate, ipratropium-albuteroL, ondansetron **OR** ondansetron, oxyCODONE   Physical Exam  Constitutional:       General: He is not in acute distress.     Appearance: He is ill-appearing. He is not toxic-appearing.      Comments: Thin   HENT:      Head: Normocephalic  "and atraumatic.      Nose:      Comments: On 2L NC     Mouth/Throat:      Mouth: Mucous membranes are moist.   Eyes:      General: No scleral icterus.     Extraocular Movements: Extraocular movements intact.      Pupils: Pupils are equal, round, and reactive to light.   Cardiovascular:      Rate and Rhythm: Normal rate and regular rhythm.      Heart sounds: No murmur heard.     No friction rub. No gallop.   Pulmonary:      Effort: Pulmonary effort is normal. No respiratory distress.      Breath sounds: Rales (bibasilar) present. No wheezing.   Abdominal:      General: Abdomen is flat. Bowel sounds are normal. There is no distension.      Palpations: Abdomen is soft.      Tenderness: There is no abdominal tenderness.   Musculoskeletal:      Cervical back: Neck supple. No rigidity.      Right lower leg: No edema.      Left lower leg: No edema.   Lymphadenopathy:      Cervical: No cervical adenopathy.   Skin:     General: Skin is warm and dry.      Coloration: Skin is not jaundiced.   Neurological:      Comments: Cannot fully assess, awake but unclear if alert.    Psychiatric:      Comments: Cannot fully assess, awake but unclear if alert.      Last Recorded Vitals  Blood pressure (!) 147/107, pulse 78, temperature 36.8 °C (98.2 °F), temperature source Temporal, resp. rate 18, height 1.6 m (5' 2.99\"), weight (!) 43.9 kg (96 lb 12.5 oz), SpO2 100%.  Intake/Output last 3 Shifts:  I/O last 3 completed shifts:  In: - (0 mL/kg)   Out: 1350 (30.8 mL/kg) [Urine:1350 (0.9 mL/kg/hr)]  Weight: 43.9 kg   Relevant Results  Results for orders placed or performed during the hospital encounter of 10/05/24 (from the past 24 hour(s))   CBC and Auto Differential   Result Value Ref Range    WBC 10.4 4.4 - 11.3 x10*3/uL    nRBC 0.0 0.0 - 0.0 /100 WBCs    RBC 4.81 4.50 - 5.90 x10*6/uL    Hemoglobin 12.4 (L) 13.5 - 17.5 g/dL    Hematocrit 41.0 41.0 - 52.0 %    MCV 85 80 - 100 fL    MCH 25.8 (L) 26.0 - 34.0 pg    MCHC 30.2 (L) 32.0 - 36.0 " g/dL    RDW 14.3 11.5 - 14.5 %    Platelets 312 150 - 450 x10*3/uL    Neutrophils % 77.1 40.0 - 80.0 %    Immature Granulocytes %, Automated 2.7 (H) 0.0 - 0.9 %    Lymphocytes % 11.0 13.0 - 44.0 %    Monocytes % 8.8 2.0 - 10.0 %    Eosinophils % 0.0 0.0 - 6.0 %    Basophils % 0.4 0.0 - 2.0 %    Neutrophils Absolute 8.02 (H) 1.20 - 7.70 x10*3/uL    Immature Granulocytes Absolute, Automated 0.28 0.00 - 0.70 x10*3/uL    Lymphocytes Absolute 1.14 (L) 1.20 - 4.80 x10*3/uL    Monocytes Absolute 0.92 0.10 - 1.00 x10*3/uL    Eosinophils Absolute 0.00 0.00 - 0.70 x10*3/uL    Basophils Absolute 0.04 0.00 - 0.10 x10*3/uL   Renal Function Panel   Result Value Ref Range    Glucose 268 (H) 74 - 99 mg/dL    Sodium 139 136 - 145 mmol/L    Potassium 4.5 3.5 - 5.3 mmol/L    Chloride 98 98 - 107 mmol/L    Bicarbonate 32 21 - 32 mmol/L    Anion Gap 14 10 - 20 mmol/L    Urea Nitrogen 16 6 - 23 mg/dL    Creatinine 0.45 (L) 0.50 - 1.30 mg/dL    eGFR >90 >60 mL/min/1.73m*2    Calcium 9.1 8.6 - 10.3 mg/dL    Phosphorus 2.3 (L) 2.5 - 4.9 mg/dL    Albumin 3.5 3.4 - 5.0 g/dL   POCT GLUCOSE   Result Value Ref Range    POCT Glucose 280 (H) 74 - 99 mg/dL   No results found.   Assessment/Plan   Assessment & Plan  Aspiration pneumonia due to anesthesia during labor and delivery, unspecified laterality, unspecified part of lung (HHS-HCC)  Most likely related to inability to maintain airway post-procedurally (baclofen pump on 10/4/24); CT with bibasilar opacities, right greater than left; Pro-Stalin 4.69; febrile and tachycardic  29 YOM with h/o cerebral palsy with spastic quadriplegia, s/p PEG placement, who p/w productive cough associated with change in mental status. In the ED found to be hypoxic, requiring 6L NC. Chest imaging showed bibasilar consolidation and debris in trach. Admitted to [facility] with the diagnosis of aspiration pneumonia/pneumonitis.     Respiratory failure: acute with hypoxia. Due to aspiration pneumonia/pneumonitis +/- atelectasis.  Overall is improving with need reducing to 2L      Continue supplemental O2, wean off as tolerates      Home O2 evaluation before DC      Bronchopulmonary hygiene with mechanical vest; NT suctioning Q6 as needed; frequent oral suctioning      Continue Duo-Neb      Management of the individual causes as below     Aspiration pneumonia:      Continue Zosyn      Aspiration precaution    Possible pneumonitis:      Continue Prednisone BID    DVT prophylaxis: subcutaneous heparin     Pulmonary will FU while in house.        Sherrill Peraza MD

## 2024-10-13 ENCOUNTER — APPOINTMENT (OUTPATIENT)
Dept: CARDIOLOGY | Facility: HOSPITAL | Age: 29
End: 2024-10-13
Payer: MEDICAID

## 2024-10-13 VITALS
HEART RATE: 106 BPM | OXYGEN SATURATION: 96 % | HEIGHT: 63 IN | WEIGHT: 96.78 LBS | TEMPERATURE: 99 F | SYSTOLIC BLOOD PRESSURE: 135 MMHG | BODY MASS INDEX: 17.15 KG/M2 | RESPIRATION RATE: 10 BRPM | DIASTOLIC BLOOD PRESSURE: 66 MMHG

## 2024-10-13 PROBLEM — J69.0 ASPIRATION PNEUMONIA (MULTI): Status: ACTIVE | Noted: 2024-10-06

## 2024-10-13 LAB
ALBUMIN SERPL BCP-MCNC: 3.5 G/DL (ref 3.4–5)
ANION GAP SERPL CALC-SCNC: 14 MMOL/L (ref 10–20)
BASOPHILS # BLD AUTO: 0.03 X10*3/UL (ref 0–0.1)
BASOPHILS NFR BLD AUTO: 0.3 %
BUN SERPL-MCNC: 20 MG/DL (ref 6–23)
CALCIUM SERPL-MCNC: 9.3 MG/DL (ref 8.6–10.3)
CHLORIDE SERPL-SCNC: 96 MMOL/L (ref 98–107)
CO2 SERPL-SCNC: 32 MMOL/L (ref 21–32)
CREAT SERPL-MCNC: 0.52 MG/DL (ref 0.5–1.3)
EGFRCR SERPLBLD CKD-EPI 2021: >90 ML/MIN/1.73M*2
EOSINOPHIL # BLD AUTO: 0.01 X10*3/UL (ref 0–0.7)
EOSINOPHIL NFR BLD AUTO: 0.1 %
ERYTHROCYTE [DISTWIDTH] IN BLOOD BY AUTOMATED COUNT: 14 % (ref 11.5–14.5)
GLUCOSE SERPL-MCNC: 316 MG/DL (ref 74–99)
HCT VFR BLD AUTO: 40.3 % (ref 41–52)
HGB BLD-MCNC: 12.9 G/DL (ref 13.5–17.5)
IMM GRANULOCYTES # BLD AUTO: 0.39 X10*3/UL (ref 0–0.7)
IMM GRANULOCYTES NFR BLD AUTO: 3.6 % (ref 0–0.9)
LYMPHOCYTES # BLD AUTO: 2.05 X10*3/UL (ref 1.2–4.8)
LYMPHOCYTES NFR BLD AUTO: 18.9 %
MCH RBC QN AUTO: 26.2 PG (ref 26–34)
MCHC RBC AUTO-ENTMCNC: 32 G/DL (ref 32–36)
MCV RBC AUTO: 82 FL (ref 80–100)
MONOCYTES # BLD AUTO: 1.11 X10*3/UL (ref 0.1–1)
MONOCYTES NFR BLD AUTO: 10.2 %
NEUTROPHILS # BLD AUTO: 7.27 X10*3/UL (ref 1.2–7.7)
NEUTROPHILS NFR BLD AUTO: 66.9 %
NRBC BLD-RTO: 0 /100 WBCS (ref 0–0)
PHOSPHATE SERPL-MCNC: 2.7 MG/DL (ref 2.5–4.9)
PLATELET # BLD AUTO: 468 X10*3/UL (ref 150–450)
POTASSIUM SERPL-SCNC: 4.1 MMOL/L (ref 3.5–5.3)
RBC # BLD AUTO: 4.93 X10*6/UL (ref 4.5–5.9)
SODIUM SERPL-SCNC: 138 MMOL/L (ref 136–145)
WBC # BLD AUTO: 10.9 X10*3/UL (ref 4.4–11.3)

## 2024-10-13 PROCEDURE — 2500000004 HC RX 250 GENERAL PHARMACY W/ HCPCS (ALT 636 FOR OP/ED): Performed by: HOSPITALIST

## 2024-10-13 PROCEDURE — 84100 ASSAY OF PHOSPHORUS: CPT | Performed by: NURSE PRACTITIONER

## 2024-10-13 PROCEDURE — 2500000001 HC RX 250 WO HCPCS SELF ADMINISTERED DRUGS (ALT 637 FOR MEDICARE OP): Performed by: INTERNAL MEDICINE

## 2024-10-13 PROCEDURE — 94669 MECHANICAL CHEST WALL OSCILL: CPT

## 2024-10-13 PROCEDURE — 2500000004 HC RX 250 GENERAL PHARMACY W/ HCPCS (ALT 636 FOR OP/ED): Performed by: INTERNAL MEDICINE

## 2024-10-13 PROCEDURE — 99232 SBSQ HOSP IP/OBS MODERATE 35: CPT | Performed by: INTERNAL MEDICINE

## 2024-10-13 PROCEDURE — 2500000004 HC RX 250 GENERAL PHARMACY W/ HCPCS (ALT 636 FOR OP/ED): Performed by: NURSE PRACTITIONER

## 2024-10-13 PROCEDURE — 2500000001 HC RX 250 WO HCPCS SELF ADMINISTERED DRUGS (ALT 637 FOR MEDICARE OP)

## 2024-10-13 PROCEDURE — 94640 AIRWAY INHALATION TREATMENT: CPT

## 2024-10-13 PROCEDURE — 93010 ELECTROCARDIOGRAM REPORT: CPT | Performed by: INTERNAL MEDICINE

## 2024-10-13 PROCEDURE — 2500000001 HC RX 250 WO HCPCS SELF ADMINISTERED DRUGS (ALT 637 FOR MEDICARE OP): Performed by: HOSPITALIST

## 2024-10-13 PROCEDURE — 99233 SBSQ HOSP IP/OBS HIGH 50: CPT | Performed by: INTERNAL MEDICINE

## 2024-10-13 PROCEDURE — 85025 COMPLETE CBC W/AUTO DIFF WBC: CPT | Performed by: NURSE PRACTITIONER

## 2024-10-13 PROCEDURE — 93005 ELECTROCARDIOGRAM TRACING: CPT

## 2024-10-13 PROCEDURE — 2500000005 HC RX 250 GENERAL PHARMACY W/O HCPCS: Performed by: HOSPITALIST

## 2024-10-13 PROCEDURE — 2500000004 HC RX 250 GENERAL PHARMACY W/ HCPCS (ALT 636 FOR OP/ED): Performed by: STUDENT IN AN ORGANIZED HEALTH CARE EDUCATION/TRAINING PROGRAM

## 2024-10-13 PROCEDURE — 2500000002 HC RX 250 W HCPCS SELF ADMINISTERED DRUGS (ALT 637 FOR MEDICARE OP, ALT 636 FOR OP/ED): Performed by: INTERNAL MEDICINE

## 2024-10-13 PROCEDURE — 2500000005 HC RX 250 GENERAL PHARMACY W/O HCPCS: Performed by: NURSE PRACTITIONER

## 2024-10-13 PROCEDURE — 36415 COLL VENOUS BLD VENIPUNCTURE: CPT | Performed by: NURSE PRACTITIONER

## 2024-10-13 PROCEDURE — 1200000002 HC GENERAL ROOM WITH TELEMETRY DAILY

## 2024-10-13 PROCEDURE — 94668 MNPJ CHEST WALL SBSQ: CPT

## 2024-10-13 RX ORDER — CLOBAZAM 10 MG/1
10 TABLET ORAL NIGHTLY
Status: DISCONTINUED | OUTPATIENT
Start: 2024-10-13 | End: 2024-10-16 | Stop reason: HOSPADM

## 2024-10-13 RX ORDER — HYDRALAZINE HYDROCHLORIDE 20 MG/ML
10 INJECTION INTRAMUSCULAR; INTRAVENOUS EVERY 6 HOURS PRN
Status: DISCONTINUED | OUTPATIENT
Start: 2024-10-13 | End: 2024-10-16 | Stop reason: HOSPADM

## 2024-10-13 RX ORDER — METOPROLOL TARTRATE 50 MG/1
50 TABLET ORAL 2 TIMES DAILY
Status: DISCONTINUED | OUTPATIENT
Start: 2024-10-14 | End: 2024-10-13

## 2024-10-13 RX ORDER — PREDNISOLONE SODIUM PHOSPHATE 15 MG/5ML
40 SOLUTION ORAL 2 TIMES DAILY
Status: DISCONTINUED | OUTPATIENT
Start: 2024-10-14 | End: 2024-10-16 | Stop reason: HOSPADM

## 2024-10-13 RX ORDER — SENNOSIDES 8.8 MG/5ML
5 LIQUID ORAL NIGHTLY
Status: DISCONTINUED | OUTPATIENT
Start: 2024-10-14 | End: 2024-10-16 | Stop reason: HOSPADM

## 2024-10-13 RX ORDER — CLOBAZAM 10 MG/1
10 TABLET ORAL NIGHTLY
Status: DISCONTINUED | OUTPATIENT
Start: 2024-10-14 | End: 2024-10-13

## 2024-10-13 RX ORDER — VIT C/E/ZN/COPPR/LUTEIN/ZEAXAN 250MG-90MG
500 CAPSULE ORAL DAILY
Status: DISCONTINUED | OUTPATIENT
Start: 2024-10-14 | End: 2024-10-16 | Stop reason: HOSPADM

## 2024-10-13 RX ORDER — DOCUSATE SODIUM 50 MG/5ML
50 LIQUID ORAL 2 TIMES DAILY
Status: DISCONTINUED | OUTPATIENT
Start: 2024-10-14 | End: 2024-10-16 | Stop reason: HOSPADM

## 2024-10-13 RX ORDER — LEVETIRACETAM 250 MG/1
750 TABLET ORAL 2 TIMES DAILY
Status: DISCONTINUED | OUTPATIENT
Start: 2024-10-14 | End: 2024-10-16 | Stop reason: HOSPADM

## 2024-10-13 RX ORDER — METOPROLOL TARTRATE 50 MG/1
50 TABLET ORAL 2 TIMES DAILY
Status: DISCONTINUED | OUTPATIENT
Start: 2024-10-13 | End: 2024-10-16 | Stop reason: HOSPADM

## 2024-10-13 RX ORDER — POLYETHYLENE GLYCOL 3350 17 G/17G
17 POWDER, FOR SOLUTION ORAL DAILY
Status: DISCONTINUED | OUTPATIENT
Start: 2024-10-14 | End: 2024-10-16 | Stop reason: HOSPADM

## 2024-10-13 RX ORDER — LACOSAMIDE 10 MG/ML
250 SOLUTION ORAL EVERY 12 HOURS SCHEDULED
Status: DISCONTINUED | OUTPATIENT
Start: 2024-10-13 | End: 2024-10-16 | Stop reason: HOSPADM

## 2024-10-13 RX ADMIN — Medication 2 L/MIN: at 08:01

## 2024-10-13 RX ADMIN — CLOBAZAM 10 MG: 10 TABLET ORAL at 22:52

## 2024-10-13 RX ADMIN — HEPARIN SODIUM 5000 UNITS: 5000 INJECTION INTRAVENOUS; SUBCUTANEOUS at 22:45

## 2024-10-13 RX ADMIN — IPRATROPIUM BROMIDE AND ALBUTEROL SULFATE 3 ML: 2.5; .5 SOLUTION RESPIRATORY (INHALATION) at 14:17

## 2024-10-13 RX ADMIN — ACETAMINOPHEN 650 MG: 650 LIQUID ORAL at 22:45

## 2024-10-13 RX ADMIN — PIPERACILLIN SODIUM AND TAZOBACTAM SODIUM 3.38 G: 3; .375 INJECTION, SOLUTION INTRAVENOUS at 03:45

## 2024-10-13 RX ADMIN — ACETAMINOPHEN 650 MG: 650 LIQUID ORAL at 06:00

## 2024-10-13 RX ADMIN — METOPROLOL TARTRATE 50 MG: 50 TABLET, FILM COATED ORAL at 06:00

## 2024-10-13 RX ADMIN — HEPARIN SODIUM 5000 UNITS: 5000 INJECTION INTRAVENOUS; SUBCUTANEOUS at 06:00

## 2024-10-13 RX ADMIN — AMLODIPINE BESYLATE 7.5 MG: 5 TABLET ORAL at 11:06

## 2024-10-13 RX ADMIN — GLYCOPYRROLATE 0.2 MG: 0.2 INJECTION, SOLUTION INTRAMUSCULAR; INTRAVITREAL at 22:47

## 2024-10-13 RX ADMIN — CLOBAZAM 10 MG: 10 TABLET ORAL at 22:55

## 2024-10-13 RX ADMIN — FOLIC ACID 1 MG: 1 TABLET ORAL at 11:06

## 2024-10-13 RX ADMIN — PREDNISOLONE SODIUM PHOSPHATE 40 MG: 15 SOLUTION ORAL at 11:07

## 2024-10-13 RX ADMIN — IPRATROPIUM BROMIDE AND ALBUTEROL SULFATE 3 ML: 2.5; .5 SOLUTION RESPIRATORY (INHALATION) at 20:52

## 2024-10-13 RX ADMIN — HEPARIN SODIUM 5000 UNITS: 5000 INJECTION INTRAVENOUS; SUBCUTANEOUS at 15:27

## 2024-10-13 RX ADMIN — HYDRALAZINE HYDROCHLORIDE 10 MG: 20 INJECTION INTRAMUSCULAR; INTRAVENOUS at 04:38

## 2024-10-13 RX ADMIN — PREDNISOLONE SODIUM PHOSPHATE 40 MG: 15 SOLUTION ORAL at 22:44

## 2024-10-13 RX ADMIN — Medication 1 L/MIN: at 20:52

## 2024-10-13 RX ADMIN — IPRATROPIUM BROMIDE AND ALBUTEROL SULFATE 3 ML: 2.5; .5 SOLUTION RESPIRATORY (INHALATION) at 01:03

## 2024-10-13 RX ADMIN — CYANOCOBALAMIN TAB 500 MCG 500 MCG: 500 TAB at 11:06

## 2024-10-13 RX ADMIN — OXYCODONE HYDROCHLORIDE 5 MG: 5 TABLET ORAL at 22:53

## 2024-10-13 RX ADMIN — PANTOPRAZOLE SODIUM 40 MG: 40 TABLET, DELAYED RELEASE ORAL at 06:00

## 2024-10-13 RX ADMIN — METOPROLOL TARTRATE 50 MG: 50 TABLET, FILM COATED ORAL at 22:54

## 2024-10-13 RX ADMIN — LEVETIRACETAM 750 MG: 250 TABLET, FILM COATED ORAL at 11:06

## 2024-10-13 RX ADMIN — METOPROLOL TARTRATE 50 MG: 50 TABLET ORAL at 22:45

## 2024-10-13 RX ADMIN — IPRATROPIUM BROMIDE AND ALBUTEROL SULFATE 3 ML: 2.5; .5 SOLUTION RESPIRATORY (INHALATION) at 08:01

## 2024-10-13 RX ADMIN — CANNABIDIOL 220 MG: 100 SOLUTION ORAL at 12:38

## 2024-10-13 RX ADMIN — PIPERACILLIN SODIUM AND TAZOBACTAM SODIUM 3.38 G: 3; .375 INJECTION, SOLUTION INTRAVENOUS at 12:24

## 2024-10-13 RX ADMIN — CANNABIDIOL 220 MG: 100 SOLUTION ORAL at 22:46

## 2024-10-13 RX ADMIN — ERGOCALCIFEROL 1250 MCG: 1.25 CAPSULE ORAL at 11:06

## 2024-10-13 RX ADMIN — Medication 2 L/MIN: at 01:03

## 2024-10-13 RX ADMIN — TAZOBACTAM SODIUM AND PIPERACILLIN SODIUM 3.38 G: 375; 3 INJECTION, SOLUTION INTRAVENOUS at 19:14

## 2024-10-13 RX ADMIN — LEVETIRACETAM 750 MG: 250 TABLET, FILM COATED ORAL at 22:48

## 2024-10-13 RX ADMIN — LACOSAMIDE 250 MG: 50 TABLET, FILM COATED ORAL at 11:05

## 2024-10-13 RX ADMIN — CLOBAZAM 10 MG: 10 TABLET ORAL at 22:47

## 2024-10-13 RX ADMIN — LACOSAMIDE 250 MG: 10 SOLUTION ORAL at 22:43

## 2024-10-13 RX ADMIN — ONDANSETRON 4 MG: 2 INJECTION, SOLUTION INTRAMUSCULAR; INTRAVENOUS at 22:47

## 2024-10-13 ASSESSMENT — COGNITIVE AND FUNCTIONAL STATUS - GENERAL
PERSONAL GROOMING: TOTAL
CLIMB 3 TO 5 STEPS WITH RAILING: TOTAL
TURNING FROM BACK TO SIDE WHILE IN FLAT BAD: TOTAL
DRESSING REGULAR UPPER BODY CLOTHING: TOTAL
MOBILITY SCORE: 6
DAILY ACTIVITIY SCORE: 6
STANDING UP FROM CHAIR USING ARMS: TOTAL
HELP NEEDED FOR BATHING: TOTAL
DRESSING REGULAR LOWER BODY CLOTHING: TOTAL
MOVING FROM LYING ON BACK TO SITTING ON SIDE OF FLAT BED WITH BEDRAILS: TOTAL
TOILETING: TOTAL
EATING MEALS: TOTAL
WALKING IN HOSPITAL ROOM: TOTAL
MOVING TO AND FROM BED TO CHAIR: TOTAL

## 2024-10-13 ASSESSMENT — PAIN SCALES - PAIN ASSESSMENT IN ADVANCED DEMENTIA (PAINAD)
FACIALEXPRESSION: SAD, FRIGHTENED, FROWN
TOTALSCORE: 4
BODYLANGUAGE: TENSE, DISTRESSED PACING, FIDGETING
CONSOLABILITY: DISTRACTED OR REASSURED BY VOICE/TOUCH
BREATHING: OCCASIONAL LABORED BREATHING, SHORT PERIOD OF HYPERVENTILATION
TOTALSCORE: MEDICATION (SEE MAR);REPOSITIONED

## 2024-10-13 ASSESSMENT — PAIN SCALES - WONG BAKER
WONGBAKER_NUMERICALRESPONSE: NO HURT
WONGBAKER_NUMERICALRESPONSE: NO HURT
WONGBAKER_NUMERICALRESPONSE: HURTS LITTLE BIT

## 2024-10-13 ASSESSMENT — PAIN DESCRIPTION - LOCATION: LOCATION: GENERALIZED

## 2024-10-13 NOTE — ASSESSMENT & PLAN NOTE
Most likely related to inability to maintain airway post-procedurally (baclofen pump on 10/4/24); CT with bibasilar opacities, right greater than left; Pro-Stalin 4.69; febrile and tachycardic  29 YOM with h/o cerebral palsy with spastic quadriplegia, s/p PEG placement, who p/w productive cough associated with change in mental status. In the ED found to be hypoxic, requiring 6L NC. Chest imaging showed bibasilar consolidation and debris in trach. Admitted to [facility] with the diagnosis of aspiration pneumonia/pneumonitis.     Respiratory failure: acute with hypoxia. Due to aspiration pneumonia/pneumonitis +/- atelectasis. Overall is improving with need reducing to 1-2L      Continue supplemental O2, wean off as tolerates      Home O2 evaluation before DC      Bronchopulmonary hygiene with mechanical vest; NT suctioning Q6 as needed; frequent oral suctioning      Continue Duo-Neb      Management of the individual causes as below     Aspiration pneumonia:      Continue Zosyn      Aspiration precaution    Possible pneumonitis:      Continue Prednisone BID    DVT prophylaxis: subcutaneous heparin     Pulmonary will FU while in house.

## 2024-10-13 NOTE — CARE PLAN
The patient's goals for the shift include      The clinical goals for the shift include pt will remain safe throughout the shift

## 2024-10-13 NOTE — PROGRESS NOTES
Vargas Romero is a 29 y.o. male on day 7 of admission presenting with Aspiration pneumonia (Multi).  Patient with h/o cerebral palsy with spastic quadriplegia, s/p PEG placement, who p/w productive cough associated with change in mental status. In the ED found to be hypoxic, requiring 6L NC. Chest imaging showed bibasilar consolidation and debris in trach. Admitted to [facility] with the diagnosis of aspiration pneumonia/pneumonitis.   Subjective   No acute overnight events. O2 requirements stable at 1-2L.      More awake today, tracks with eyes, but cannot provide history.   Objective   Scheduled medications  amLODIPine, 7.5 mg, oral, Daily  cannabidiol, 5 mg/kg, oral, BID  cloBAZam, 10 mg, oral, Nightly  cyanocobalamin, 500 mcg, oral, Daily  docusate sodium, 50 mg, oral, BID  ergocalciferol, 1,250 mcg, oral, Weekly  folic acid, 1 mg, oral, Daily  heparin (porcine), 5,000 Units, subcutaneous, q8h POPEYE  ipratropium-albuteroL, 3 mL, nebulization, q6h  lacosamide, 250 mg, oral, BID  levETIRAcetam, 750 mg, oral, BID  metoprolol tartrate, 50 mg, oral, BID  oxygen, , inhalation, Continuous - Inhalation  pantoprazole, 40 mg, oral, Daily before breakfast   Or  pantoprazole, 40 mg, intravenous, Daily before breakfast  piperacillin-tazobactam, 3.375 g, intravenous, q6h  polyethylene glycol, 17 g, oral, Daily  prednisoLONE, 40 mg, oral, BID  senna, 5 mL, oral, Nightly  sodium phosphates, 1 enema, rectal, Once    Continuous medications     PRN medications  PRN medications: acetaminophen, acetaminophen **OR** [DISCONTINUED] acetaminophen **OR** acetaminophen, bisacodyl, glycopyrrolate, hydrALAZINE, ipratropium-albuteroL, ondansetron **OR** ondansetron, oxyCODONE   Physical Exam  Constitutional:       General: He is not in acute distress.     Appearance: He is ill-appearing. He is not toxic-appearing.      Comments: Thin   HENT:      Head: Normocephalic and atraumatic.      Nose:      Comments: On 1L NC     Mouth/Throat:      Mouth:  "Mucous membranes are moist.   Eyes:      General: No scleral icterus.     Extraocular Movements: Extraocular movements intact.      Pupils: Pupils are equal, round, and reactive to light.   Cardiovascular:      Rate and Rhythm: Normal rate and regular rhythm.      Heart sounds: No murmur heard.     No friction rub. No gallop.   Pulmonary:      Effort: Pulmonary effort is normal. No respiratory distress.      Breath sounds: Rales (bibasilar) present. No wheezing.   Abdominal:      General: Abdomen is flat. Bowel sounds are normal. There is no distension.      Palpations: Abdomen is soft.      Tenderness: There is no abdominal tenderness.   Musculoskeletal:      Cervical back: Neck supple. No rigidity.      Right lower leg: No edema.      Left lower leg: No edema.   Lymphadenopathy:      Cervical: No cervical adenopathy.   Skin:     General: Skin is warm and dry.      Coloration: Skin is not jaundiced.   Neurological:      Comments: Cannot fully assess, awake but unclear if alert.    Psychiatric:      Comments: Cannot fully assess, awake but unclear if alert.      Last Recorded Vitals  Blood pressure 140/87, pulse 87, temperature 36.5 °C (97.7 °F), temperature source Oral, resp. rate 10, height 1.6 m (5' 2.99\"), weight (!) 43.9 kg (96 lb 12.5 oz), SpO2 99%.  Intake/Output last 3 Shifts:  I/O last 3 completed shifts:  In: - (0 mL/kg)   Out: 1600 (36.4 mL/kg) [Urine:1600 (1 mL/kg/hr)]  Weight: 43.9 kg   Relevant Results  Results for orders placed or performed during the hospital encounter of 10/05/24 (from the past 24 hour(s))   CBC and Auto Differential   Result Value Ref Range    WBC 10.9 4.4 - 11.3 x10*3/uL    nRBC 0.0 0.0 - 0.0 /100 WBCs    RBC 4.93 4.50 - 5.90 x10*6/uL    Hemoglobin 12.9 (L) 13.5 - 17.5 g/dL    Hematocrit 40.3 (L) 41.0 - 52.0 %    MCV 82 80 - 100 fL    MCH 26.2 26.0 - 34.0 pg    MCHC 32.0 32.0 - 36.0 g/dL    RDW 14.0 11.5 - 14.5 %    Platelets 468 (H) 150 - 450 x10*3/uL    Neutrophils % 66.9 40.0 - " 80.0 %    Immature Granulocytes %, Automated 3.6 (H) 0.0 - 0.9 %    Lymphocytes % 18.9 13.0 - 44.0 %    Monocytes % 10.2 2.0 - 10.0 %    Eosinophils % 0.1 0.0 - 6.0 %    Basophils % 0.3 0.0 - 2.0 %    Neutrophils Absolute 7.27 1.20 - 7.70 x10*3/uL    Immature Granulocytes Absolute, Automated 0.39 0.00 - 0.70 x10*3/uL    Lymphocytes Absolute 2.05 1.20 - 4.80 x10*3/uL    Monocytes Absolute 1.11 (H) 0.10 - 1.00 x10*3/uL    Eosinophils Absolute 0.01 0.00 - 0.70 x10*3/uL    Basophils Absolute 0.03 0.00 - 0.10 x10*3/uL   Renal Function Panel   Result Value Ref Range    Glucose 316 (H) 74 - 99 mg/dL    Sodium 138 136 - 145 mmol/L    Potassium 4.1 3.5 - 5.3 mmol/L    Chloride 96 (L) 98 - 107 mmol/L    Bicarbonate 32 21 - 32 mmol/L    Anion Gap 14 10 - 20 mmol/L    Urea Nitrogen 20 6 - 23 mg/dL    Creatinine 0.52 0.50 - 1.30 mg/dL    eGFR >90 >60 mL/min/1.73m*2    Calcium 9.3 8.6 - 10.3 mg/dL    Phosphorus 2.7 2.5 - 4.9 mg/dL    Albumin 3.5 3.4 - 5.0 g/dL   No results found.   Assessment/Plan   Assessment & Plan  Aspiration pneumonia (Multi)  Most likely related to inability to maintain airway post-procedurally (baclofen pump on 10/4/24); CT with bibasilar opacities, right greater than left; Pro-Stalin 4.69; febrile and tachycardic  29 YOM with h/o cerebral palsy with spastic quadriplegia, s/p PEG placement, who p/w productive cough associated with change in mental status. In the ED found to be hypoxic, requiring 6L NC. Chest imaging showed bibasilar consolidation and debris in trach. Admitted to [facility] with the diagnosis of aspiration pneumonia/pneumonitis.     Respiratory failure: acute with hypoxia. Due to aspiration pneumonia/pneumonitis +/- atelectasis. Overall is improving with need reducing to 1-2L      Continue supplemental O2, wean off as tolerates      Home O2 evaluation before DC      Bronchopulmonary hygiene with mechanical vest; NT suctioning Q6 as needed; frequent oral suctioning      Continue Duo-Neb      Management  of the individual causes as below     Aspiration pneumonia:      Continue Zosyn      Aspiration precaution    Possible pneumonitis:      Continue Prednisone BID    DVT prophylaxis: subcutaneous heparin     Pulmonary will FU while in house.        Sherrill Peraza MD

## 2024-10-13 NOTE — PROGRESS NOTES
"Vargas Romero is a 29 y.o. male on day 7 of admission presenting with Aspiration pneumonia (Multi).    Subjective   No acute events overnight. Lying in bed, breathing comfortably. Mother at the bedside. Notes he had a pretty good BM this AM.        Objective     VITALS  Blood pressure 151/83, pulse 87, temperature 36.4 °C (97.6 °F), temperature source Axillary, resp. rate 10, height 1.6 m (5' 2.99\"), weight (!) 43.9 kg (96 lb 12.5 oz), SpO2 95%.  Physical Exam  Vitals and nursing note reviewed.   Constitutional:       General: He is not in acute distress.     Appearance: Normal appearance. He is not ill-appearing.   HENT:      Head: Normocephalic and atraumatic.      Mouth/Throat:      Mouth: Mucous membranes are moist.      Pharynx: Oropharynx is clear.   Eyes:      Extraocular Movements: Extraocular movements intact.      Conjunctiva/sclera: Conjunctivae normal.   Cardiovascular:      Rate and Rhythm: Normal rate and regular rhythm.      Heart sounds: Normal heart sounds. No murmur heard.     No friction rub. No gallop.   Pulmonary:      Effort: Pulmonary effort is normal.      Breath sounds: Normal breath sounds. No wheezing or rales.   Abdominal:      General: Bowel sounds are normal. There is no distension.      Palpations: Abdomen is soft.      Tenderness: There is no abdominal tenderness. There is no guarding.      Comments: Abdominal incision site, c/d/i   Musculoskeletal:         General: No swelling or tenderness.      Right lower leg: No edema.      Left lower leg: No edema.      Comments: Contracted limbs    Skin:     General: Skin is warm and dry.      Capillary Refill: Capillary refill takes less than 2 seconds.   Neurological:      General: No focal deficit present.      Mental Status: He is alert. Mental status is at baseline.   Psychiatric:         Mood and Affect: Mood normal.           Intake/Output last 3 Shifts:  I/O last 3 completed shifts:  In: - (0 mL/kg)   Out: 1600 (36.4 mL/kg) " [Urine:1600 (1 mL/kg/hr)]  Weight: 43.9 kg     Relevant Results  Results for orders placed or performed during the hospital encounter of 10/05/24 (from the past 24 hour(s))   CBC and Auto Differential   Result Value Ref Range    WBC 10.9 4.4 - 11.3 x10*3/uL    nRBC 0.0 0.0 - 0.0 /100 WBCs    RBC 4.93 4.50 - 5.90 x10*6/uL    Hemoglobin 12.9 (L) 13.5 - 17.5 g/dL    Hematocrit 40.3 (L) 41.0 - 52.0 %    MCV 82 80 - 100 fL    MCH 26.2 26.0 - 34.0 pg    MCHC 32.0 32.0 - 36.0 g/dL    RDW 14.0 11.5 - 14.5 %    Platelets 468 (H) 150 - 450 x10*3/uL    Neutrophils % 66.9 40.0 - 80.0 %    Immature Granulocytes %, Automated 3.6 (H) 0.0 - 0.9 %    Lymphocytes % 18.9 13.0 - 44.0 %    Monocytes % 10.2 2.0 - 10.0 %    Eosinophils % 0.1 0.0 - 6.0 %    Basophils % 0.3 0.0 - 2.0 %    Neutrophils Absolute 7.27 1.20 - 7.70 x10*3/uL    Immature Granulocytes Absolute, Automated 0.39 0.00 - 0.70 x10*3/uL    Lymphocytes Absolute 2.05 1.20 - 4.80 x10*3/uL    Monocytes Absolute 1.11 (H) 0.10 - 1.00 x10*3/uL    Eosinophils Absolute 0.01 0.00 - 0.70 x10*3/uL    Basophils Absolute 0.03 0.00 - 0.10 x10*3/uL   Renal Function Panel   Result Value Ref Range    Glucose 316 (H) 74 - 99 mg/dL    Sodium 138 136 - 145 mmol/L    Potassium 4.1 3.5 - 5.3 mmol/L    Chloride 96 (L) 98 - 107 mmol/L    Bicarbonate 32 21 - 32 mmol/L    Anion Gap 14 10 - 20 mmol/L    Urea Nitrogen 20 6 - 23 mg/dL    Creatinine 0.52 0.50 - 1.30 mg/dL    eGFR >90 >60 mL/min/1.73m*2    Calcium 9.3 8.6 - 10.3 mg/dL    Phosphorus 2.7 2.5 - 4.9 mg/dL    Albumin 3.5 3.4 - 5.0 g/dL       Imaging Results  MR brain w and wo IV contrast   Final Result   No acute infarct, hemorrhage or mass is noted.        Chronic foci of cystic encephalomalacia are noted along the corona   radiata with adjacent gliosis as well.        MACRO:   None        Signed by: Gm Vang 10/11/2024 7:21 AM   Dictation workstation:   [id number]      XR chest 1 view   Final Result   Coarse bilateral infiltrates,  increased in the right upper chest   since 10/06/2024.        MACRO:   None.        Signed by: Madiha Elias 10/10/2024 8:15 AM   Dictation workstation:   [id number]      CT head wo IV contrast   Final Result   No acute intracranial abnormality.        Redemonstration of age disproportionate supratentorial volume loss   and white matter gliosis, most likely reflecting diffuse remote   cerebral insult.        MACRO:   None.        Signed by: Rafi Morrell 10/8/2024 10:01 PM   Dictation workstation:   [id number]      CT abdomen pelvis w IV contrast   Final Result   1. Confluent edema surrounding the baclofen pump which extends   distally to the right groin and months pubis and right lateral   proximal thigh and slightly superiorly to the level of the mid   abdominal wall. There is no evidence of a discrete loculated fluid   collection. There has been resolution of previous abdominal wall air   from recent placement. This edema is nonspecific in the immediate   postoperative context and given that it was present immediately after   placement is most likely postoperative in nature. Continued clinical   follow-up recommended to ensure resolution.        2. Compared to CT 10/06/2024 there is significant worsening of now   confluent diffuse consolidation within the entirety of the visualized   right middle lobe and right lower lobe. There is also worsening of   consolidation throughout the lingula and left lower lobe that is   becoming more confluent and diffuse as well with background   ground-glass opacity. There is visualization of gastroesophageal   reflux into the distal esophagus. Differential diagnosis includes   worsening pneumonia, developing diffuse alveolar damage superimposed   upon pneumonia or, call pneumonitis from recent aspiration though   less likely given clear airways.        3. Worsening fluid distention of small and large bowel that could be   related to developing enteritis/colitis.        4.  Large amount of impacted stool in the rectum which is distended up   to 8.2 cm similar prior study. Consider disimpaction to avoid   complication of stercoral colitis.        5. Redemonstration of intrahepatic portal venous-hepatic venous shunt   segment 7.        MACRO:   None.        Signed by: Rafi Morrell 10/8/2024 10:12 PM   Dictation workstation:   [id number]      XR chest 1 view   Final Result   1. Extensive bilateral multifocal airspace disease worse in the right   lung base. Underlying pneumonia and aspiration pneumonitis in the   differential                  MACRO:   None        Signed by: Anthony Silva 10/6/2024 6:42 PM   Dictation workstation:   [id number]      CT head wo IV contrast   Final Result   1. No acute intracranial hemorrhage or mass effect.   2. Prominent ventricles and extra-axial CSF spaces, reflecting   parenchymal volume loss.   3. Patchy hypoattenuation of the white matter which is favored to be   artifactual.        Signed by: Timothy Quinn 10/6/2024 1:13 AM   Dictation workstation:   [id number]      CT angio chest for pulmonary embolism   Final Result   1. Patchy right-greater-than-left lower lobe consolidative and   ground-glass opacities compatible with pneumonia. Debris within the   dependent trachea raising concern for aspiration.   2. No pulmonary embolism. Motion artifact limits evaluation of   subsegmental pulmonary arteries.   3. Right lower quadrant subcutaneous medication pump in place;   metallic streak artifact limits evaluation of adjacent structures.   There is soft tissue edema and soft tissue gas surrounding the pump   compatible with recent placement. No distinct organized fluid   collection is identified.             Signed by: Timothy Quinn 10/6/2024 1:27 AM   Dictation workstation:   [id number]      CT abdomen pelvis w IV contrast   Final Result   1. Patchy right-greater-than-left lower lobe consolidative and   ground-glass opacities  compatible with pneumonia. Debris within the   dependent trachea raising concern for aspiration.   2. No pulmonary embolism. Motion artifact limits evaluation of   subsegmental pulmonary arteries.   3. Right lower quadrant subcutaneous medication pump in place;   metallic streak artifact limits evaluation of adjacent structures.   There is soft tissue edema and soft tissue gas surrounding the pump   compatible with recent placement. No distinct organized fluid   collection is identified.             Signed by: Timothy Quinn 10/6/2024 1:27 AM   Dictation workstation:   [id number]      XR chest 1 view   Final Result   The right lung is asymmetrically lucent compared to the left. This   finding is nonspecific and may represent congenital lobar emphysema,   trapped air secondary to endobronchial obstruction, or left-sided   airspace disease. Recommend chest CT to better evaluate.        Prominent gas is present in the left upper quadrant, possibly in the   colon and stomach but extraluminal air is not excluded. This can also   be evaluated on the above recommended chest CT.        MACRO:   None        Signed by: Felix Devlin 10/6/2024 12:13 AM   Dictation workstation:   [id number]          Medications:  amLODIPine, 7.5 mg, oral, Daily  cannabidiol, 5 mg/kg, oral, BID  cloBAZam, 10 mg, oral, Nightly  cyanocobalamin, 500 mcg, oral, Daily  docusate sodium, 50 mg, oral, BID  ergocalciferol, 1,250 mcg, oral, Weekly  folic acid, 1 mg, oral, Daily  heparin (porcine), 5,000 Units, subcutaneous, q8h POPEYE  ipratropium-albuteroL, 3 mL, nebulization, q6h  lacosamide, 250 mg, oral, BID  levETIRAcetam, 750 mg, oral, BID  metoprolol tartrate, 50 mg, oral, BID  oxygen, , inhalation, Continuous - Inhalation  pantoprazole, 40 mg, oral, Daily before breakfast   Or  pantoprazole, 40 mg, intravenous, Daily before breakfast  piperacillin-tazobactam, 3.375 g, intravenous, q6h  polyethylene glycol, 17 g, oral, Daily  prednisoLONE, 40  mg, oral, BID  senna, 5 mL, oral, Nightly  sodium phosphates, 1 enema, rectal, Once       PRN medications: acetaminophen, acetaminophen **OR** [DISCONTINUED] acetaminophen **OR** acetaminophen, bisacodyl, glycopyrrolate, hydrALAZINE, ipratropium-albuteroL, ondansetron **OR** ondansetron, oxyCODONE        Assessment/Plan          Principal Problem:    Aspiration pneumonia (Multi)    Acute Hypoxic Respiratory Failure, not on O2 at home   Sepsis due to aspiration pneumonia  - Continue IV Zosyn  - steroids per pulm recs  - s/p IV Lasix x 1 dose  - Pulm and RT consulted; will need aggressive pulmonary hygiene, vaishali nebs. NT Suctioning bid, Vest therapy.   - wean O2 for goal o2 sat 88-92%     Abnormal eye movements  -Nocturnist called as pt was looking toward his left often, mom concerned for stroke  -CT head negative  -Neuro consulted: Appreciate evaluation recommendations  -MRI brain, resulted negative for intracranial process     Sinus tachycardia  -Improved with change of metoprolol to 50 twice daily  - due to above; treat underlying issues not HR  - Monitor on telemetry     Cerebral palsy with spastic quadriplegia  Seizure disorder  - continue cannabidiol, onfi, vimpat, keppra  - recent baclofen pump placement     GERD  - continue ppi     HTN  -Acceptable systolic, but elevated diastolic  - continue norvasc, metoprolol ( increased to 50 twice daily)     Constipation, resolved   - Scheduled stool softeners and laxative  - As needed enema/suppositories    DVT Prophylaxis:  Heparin subq    Disposition:  Anticipate DC in the next day or so if can wean off O2.     Shiva Burgos, DO [facility]

## 2024-10-14 LAB
ANION GAP SERPL CALC-SCNC: 13 MMOL/L (ref 10–20)
BUN SERPL-MCNC: 19 MG/DL (ref 6–23)
CALCIUM SERPL-MCNC: 9 MG/DL (ref 8.6–10.3)
CHLORIDE SERPL-SCNC: 94 MMOL/L (ref 98–107)
CO2 SERPL-SCNC: 31 MMOL/L (ref 21–32)
CREAT SERPL-MCNC: 0.54 MG/DL (ref 0.5–1.3)
EGFRCR SERPLBLD CKD-EPI 2021: >90 ML/MIN/1.73M*2
ERYTHROCYTE [DISTWIDTH] IN BLOOD BY AUTOMATED COUNT: 14.5 % (ref 11.5–14.5)
GLUCOSE BLD MANUAL STRIP-MCNC: 289 MG/DL (ref 74–99)
GLUCOSE SERPL-MCNC: 303 MG/DL (ref 74–99)
HCT VFR BLD AUTO: 39.8 % (ref 41–52)
HGB BLD-MCNC: 12.5 G/DL (ref 13.5–17.5)
MCH RBC QN AUTO: 25.6 PG (ref 26–34)
MCHC RBC AUTO-ENTMCNC: 31.4 G/DL (ref 32–36)
MCV RBC AUTO: 81 FL (ref 80–100)
NRBC BLD-RTO: 0 /100 WBCS (ref 0–0)
PLATELET # BLD AUTO: 535 X10*3/UL (ref 150–450)
POTASSIUM SERPL-SCNC: 4.2 MMOL/L (ref 3.5–5.3)
RBC # BLD AUTO: 4.89 X10*6/UL (ref 4.5–5.9)
SODIUM SERPL-SCNC: 134 MMOL/L (ref 136–145)
WBC # BLD AUTO: 10.7 X10*3/UL (ref 4.4–11.3)

## 2024-10-14 PROCEDURE — 2500000002 HC RX 250 W HCPCS SELF ADMINISTERED DRUGS (ALT 637 FOR MEDICARE OP, ALT 636 FOR OP/ED): Performed by: INTERNAL MEDICINE

## 2024-10-14 PROCEDURE — 99233 SBSQ HOSP IP/OBS HIGH 50: CPT | Performed by: INTERNAL MEDICINE

## 2024-10-14 PROCEDURE — 94668 MNPJ CHEST WALL SBSQ: CPT

## 2024-10-14 PROCEDURE — 94640 AIRWAY INHALATION TREATMENT: CPT

## 2024-10-14 PROCEDURE — 2500000005 HC RX 250 GENERAL PHARMACY W/O HCPCS: Performed by: NURSE PRACTITIONER

## 2024-10-14 PROCEDURE — 94669 MECHANICAL CHEST WALL OSCILL: CPT

## 2024-10-14 PROCEDURE — 2500000001 HC RX 250 WO HCPCS SELF ADMINISTERED DRUGS (ALT 637 FOR MEDICARE OP): Performed by: INTERNAL MEDICINE

## 2024-10-14 PROCEDURE — 80048 BASIC METABOLIC PNL TOTAL CA: CPT | Performed by: INTERNAL MEDICINE

## 2024-10-14 PROCEDURE — 36415 COLL VENOUS BLD VENIPUNCTURE: CPT | Performed by: INTERNAL MEDICINE

## 2024-10-14 PROCEDURE — 2500000001 HC RX 250 WO HCPCS SELF ADMINISTERED DRUGS (ALT 637 FOR MEDICARE OP)

## 2024-10-14 PROCEDURE — 2500000004 HC RX 250 GENERAL PHARMACY W/ HCPCS (ALT 636 FOR OP/ED): Performed by: INTERNAL MEDICINE

## 2024-10-14 PROCEDURE — 2500000001 HC RX 250 WO HCPCS SELF ADMINISTERED DRUGS (ALT 637 FOR MEDICARE OP): Performed by: NURSE PRACTITIONER

## 2024-10-14 PROCEDURE — 1100000001 HC PRIVATE ROOM DAILY

## 2024-10-14 PROCEDURE — 85027 COMPLETE CBC AUTOMATED: CPT | Performed by: INTERNAL MEDICINE

## 2024-10-14 PROCEDURE — 82947 ASSAY GLUCOSE BLOOD QUANT: CPT

## 2024-10-14 PROCEDURE — 99232 SBSQ HOSP IP/OBS MODERATE 35: CPT | Performed by: INTERNAL MEDICINE

## 2024-10-14 PROCEDURE — 2500000005 HC RX 250 GENERAL PHARMACY W/O HCPCS: Performed by: HOSPITALIST

## 2024-10-14 PROCEDURE — 2500000004 HC RX 250 GENERAL PHARMACY W/ HCPCS (ALT 636 FOR OP/ED): Performed by: NURSE PRACTITIONER

## 2024-10-14 ASSESSMENT — COGNITIVE AND FUNCTIONAL STATUS - GENERAL
PERSONAL GROOMING: TOTAL
TOILETING: TOTAL
HELP NEEDED FOR BATHING: TOTAL
EATING MEALS: TOTAL
MOBILITY SCORE: 6
DRESSING REGULAR UPPER BODY CLOTHING: TOTAL
WALKING IN HOSPITAL ROOM: TOTAL
MOVING TO AND FROM BED TO CHAIR: TOTAL
CLIMB 3 TO 5 STEPS WITH RAILING: TOTAL
MOVING FROM LYING ON BACK TO SITTING ON SIDE OF FLAT BED WITH BEDRAILS: TOTAL
STANDING UP FROM CHAIR USING ARMS: TOTAL
DAILY ACTIVITIY SCORE: 6
TURNING FROM BACK TO SIDE WHILE IN FLAT BAD: TOTAL
DRESSING REGULAR LOWER BODY CLOTHING: TOTAL

## 2024-10-14 ASSESSMENT — PAIN SCALES - PAIN ASSESSMENT IN ADVANCED DEMENTIA (PAINAD)
BREATHING: NORMAL
NEGVOCALIZATION: OCCASIONAL MOAN/GROAN, LOW SPEECH, NEGATIVE/DISAPPROVING QUALITY
CONSOLABILITY: NO NEED TO CONSOLE
BODYLANGUAGE: RELAXED

## 2024-10-14 ASSESSMENT — PAIN SCALES - GENERAL: PAINLEVEL_OUTOF10: 0 - NO PAIN

## 2024-10-14 ASSESSMENT — PAIN SCALES - WONG BAKER: WONGBAKER_NUMERICALRESPONSE: NO HURT

## 2024-10-14 NOTE — PROGRESS NOTES
For follow-up of:  Aspiration pneumonia    Subjective   Interval History:  He is breathing decently on 1 L/min nasal cannula oxygen.  His mother is at the bedside       Objective     Current Facility-Administered Medications   Medication Dose Route Frequency Provider Last Rate Last Admin    acetaminophen (Tylenol) oral liquid 650 mg  650 mg oral q4h PRN Ritu Baldwin PharmD   650 mg at 10/14/24 0242    acetaminophen (Tylenol) tablet 650 mg  650 mg oral q4h PRN Marcos G Salomone, DO   650 mg at 10/09/24 1111    Or    acetaminophen (Tylenol) suppository 650 mg  650 mg rectal q4h PRN Marcos G Salomone, DO   650 mg at 10/06/24 2046    amLODIPine (Norvasc) tablet 7.5 mg  7.5 mg g-tube Daily LIBERTAD Jhaveri-CNP   7.5 mg at 10/14/24 0946    bisacodyl (Dulcolax) EC tablet 5 mg  5 mg oral Daily PRN Marcos G Salomone, DO        cannabidiol (Epidiolex) solution 220 mg - PATIENT OWN MEDICATION  5 mg/kg oral BID Octavio Goudiaby, DO   220 mg at 10/14/24 1032    cloBAZam (Onfi) tablet 10 mg  10 mg g-tube Nightly Krystle Herrera PharmD   10 mg at 10/13/24 2255    cyanocobalamin (Vitamin B-12) tablet 500 mcg  500 mcg g-tube Daily LIBERTAD Jhaveri-CNP   500 mcg at 10/14/24 0946    docusate sodium (Colace) oral liquid 50 mg  50 mg g-tube BID LIBERTAD Jhaveri-CNP   50 mg at 10/14/24 0954    ergocalciferol (Vitamin D-2) capsule 1,250 mcg  1,250 mcg oral Weekly LIBERTAD Jhaveri-CNP   1,250 mcg at 10/13/24 1106    folic acid (Folvite) tablet 1 mg  1 mg oral Daily Marcos G Salomone, DO   1 mg at 10/14/24 0946    glycopyrrolate PF (Robinul) 0.2 mg/mL injection 0.2 mg  0.2 mg intravenous q4h PRN Tati White MD   0.2 mg at 10/13/24 2247    heparin (porcine) injection 5,000 Units  5,000 Units subcutaneous q8h [facility] Marcos Mercado DO   5,000 Units at 10/14/24 1302    hydrALAZINE (Apresoline) injection 10 mg  10 mg intravenous q6h PRN Sandrita Fair MD   10 mg at 10/13/24 0438    ipratropium-albuteroL (Duo-Neb) 0.5-2.5 mg/3 mL nebulizer  solution 3 mL  3 mL nebulization q6h Marcos G Salomone, DO   3 mL at 10/14/24 1432    ipratropium-albuteroL (Duo-Neb) 0.5-2.5 mg/3 mL nebulizer solution 3 mL  3 mL nebulization q2h PRN Marcos G Salomone, DO        lacosamide (Vimpat) oral liquid 250 mg  250 mg g-tube q12h POPEYE LIBERTAD Jhaveri-CNP   250 mg at 10/14/24 0945    levETIRAcetam (Keppra) tablet 750 mg  750 mg g-tube BID Adriel Lock APRN-CNP   750 mg at 10/14/24 0953    metoprolol tartrate (Lopressor) tablet 50 mg  50 mg g-tube BID Krystle Herrera PharmD   50 mg at 10/14/24 0954    ondansetron (Zofran) tablet 4 mg  4 mg oral q8h PRN Marcos G Salomone, DO        Or    ondansetron (Zofran) injection 4 mg  4 mg intravenous q8h PRN Marcos G Salomone, DO   4 mg at 10/13/24 2247    oxyCODONE (Roxicodone) immediate release tablet 5 mg  5 mg oral q6h PRN Marcos G Salomone, DO   5 mg at 10/13/24 2253    oxygen (O2) therapy   inhalation Continuous - Inhalation Deep Coronel, DO   1 L/min at 10/14/24 0831    pantoprazole (ProtoNix) EC tablet 40 mg  40 mg oral Daily before breakfast Marcos G Salomone, DO   40 mg at 10/13/24 0600    Or    pantoprazole (ProtoNix) injection 40 mg  40 mg intravenous Daily before breakfast Marcos G Salomone, DO   40 mg at 10/14/24 0607    piperacillin-tazobactam (Zosyn) 3.375 g in dextrose (iso) IV 50 mL  3.375 g intravenous q6h Marcos G Salomone, DO   Stopped at 10/14/24 1436    polyethylene glycol (Glycolax, Miralax) packet 17 g  17 g g-tube Daily LIBERTAD Jhaveri-CNP   17 g at 10/14/24 0954    prednisoLONE sodium phosphate (OrapRED) oral solution 40 mg  40 mg g-tube BID Adriel J Lock, APRN-CNP   40 mg at 10/14/24 1100    senna (Senokot) 8.8 mg/5 mL syrup 5 mL  5 mL g-tube Nightly Adriel Lock APRN-CNP        sodium phosphates (Fleets) 19-7 gram/118 mL enema 1 enema  1 enema rectal Once Octavio Chadwick, DO            Last Recorded Vitals  /77   Pulse 99   Temp 37.4 °C (99.3 °F) (Oral)   Resp 14   Wt (!) 43.9 kg (96 lb 12.5 oz)   SpO2 98%    General: no acute distress, lying in bed, sleeping, mother at bedside  HEENT: pharynx not examined  CVS: RRR  Resp: decreased in the bases, wearing nasal cannula oxygen  ABD: soft, NT, ND, PEG  : External urinary catheter  EXT: no edema  Skin: Right lower quadrant surgical site    Relevant Results:    Labs:   Results from last 72 hours   Lab Units 10/14/24  0643 10/13/24  0738 10/12/24  0516   SODIUM mmol/L 134* 138 139   POTASSIUM mmol/L 4.2 4.1 4.5   CHLORIDE mmol/L 94* 96* 98   BUN mg/dL 19 20 16   CREATININE mg/dL 0.54 0.52 0.45*   PHOSPHORUS mg/dL  --  2.7 2.3*     Results from last 72 hours   Lab Units 10/14/24  0643 10/13/24  0738 10/12/24  0516   WBC AUTO x10*3/uL 10.7 10.9 10.4   HEMOGLOBIN g/dL 12.5* 12.9* 12.4*   HEMATOCRIT % 39.8* 40.3* 41.0   PLATELETS AUTO x10*3/uL 535* 468* 312       Microbiology data: I have personally and independently reviewed and intrepreted the lab results  10/5/2024 blood culture show no growth  10/6/2024 MRSA swab negative  10/6/2024 urine Legionella antigen negative; urine pneumococcal antigen negative  10/8/2024 MRSA swab negative  10/8/2024 sputum culture was a poor sample contaminated with saliva    Imaging data: I have personally and independently reviewed and interpreted the imaging studies  10/5/2024 chest x-ray shows lung infiltrates  10/5/2024 head CT shows no acute issues  10/5/2024 CT chest abdomen pelvis shows right more than left infiltrates with debris in the trachea; there is soft tissue gas around the newly inserted pump  10/6/2024 chest x-ray shows bilateral infiltrates  10/8/2024 CT head shows no acute issues  10/8/2024 CT abdomen pelvis shows postoperative fluid at the baclofen pump; there is increased and more confluent right middle lobe and right lower lobe infiltrate; there is fluid distention of the bowel with constipation  10/10/2024 MRI brain showed no acute issues    Assessment/Plan     MY IMPRESSION & RECOMMENDATIONS:  Aspiration pneumonia, being  treated with IV antibiotics.  I have personally and independently reviewed and interpreted the laboratory tests, imaging studies, and the documentation from other healthcare providers.  I am monitoring for side effects from Zosyn as outlined in the previous note.  He is only requiring a small amount of oxygen supplementation for his acute hypoxic respiratory failure at this point.  My plan will be to discharge him when ready on oral Augmentin through the PEG tube up through 10/19/2024.    -Continue Zosyn for now     Other issues:  #Acute metabolic encephalopathy, which we are monitoring  #Cerebral palsy with spastic quadriplegia status post PEG and baclofen pump  #Seizure disorder  #GERD  #Hypertension       Micah Padilla MD

## 2024-10-14 NOTE — CARE PLAN
Problem: Skin  Goal: Participates in plan/prevention/treatment measures  Outcome: Progressing  Goal: Prevent/minimize sheer/friction injuries  Outcome: Progressing  Goal: Promote/optimize nutrition  Flowsheets (Taken 10/14/2024 0420)  Promote/optimize nutrition: Monitor/record intake including meals     Problem: Respiratory  Goal: Clear secretions with interventions this shift  Outcome: Progressing  Goal: No signs of respiratory distress (eg. Use of accessory muscles. Peds grunting)  Outcome: Progressing  Goal: Patent airway maintained this shift  Outcome: Progressing

## 2024-10-14 NOTE — ASSESSMENT & PLAN NOTE
Most likely related to inability to maintain airway post-procedurally (baclofen pump on 10/4/24); CT with bibasilar opacities, right greater than left; Pro-Stalin 4.69; febrile and tachycardic  29 YOM with h/o cerebral palsy with spastic quadriplegia, s/p PEG placement, who p/w productive cough associated with change in mental status. In the ED found to be hypoxic, requiring 6L NC. Chest imaging showed bibasilar consolidation and debris in trach. Admitted to [facility] with the diagnosis of aspiration pneumonia/pneumonitis.     Respiratory failure: acute with hypoxia. Due to aspiration pneumonia/pneumonitis +/- atelectasis. Overall is improving with need reducing to 1-2L      Continue supplemental O2, wean off as tolerates      Home O2 evaluation before DC      Bronchopulmonary hygiene with mechanical vest; NT suctioning Q6 as needed; frequent oral suctioning      Continue Duo-Neb      Management of the individual causes as below     Aspiration pneumonia:      Continue Zosyn      Aspiration precaution    Possible pneumonitis:      Continue Prednisone BID    DVT prophylaxis: subcutaneous heparin     Pulmonary will FU while in house.

## 2024-10-14 NOTE — CARE PLAN
The patient's goals for the shift include      The clinical goals for the shift include Vargas will not have respiratorty distress during the shift.      Problem: Fall/Injury  Goal: Not fall by end of shift  Outcome: Progressing  Goal: Be free from injury by end of the shift  Outcome: Progressing

## 2024-10-14 NOTE — PROGRESS NOTES
"Vargas Romero is a 29 y.o. male on day 8 of admission presenting with Aspiration pneumonia (Multi).    Subjective   Mother feels he's more alert today. Concerned about dark spot on R hip.        Objective     VITALS  Blood pressure 156/83, pulse 102, temperature 36.4 °C (97.5 °F), temperature source Oral, resp. rate 10, height 1.6 m (5' 2.99\"), weight (!) 43.9 kg (96 lb 12.5 oz), SpO2 94%.  Physical Exam  Vitals and nursing note reviewed.   Constitutional:       General: He is not in acute distress.     Appearance: Normal appearance. He is not ill-appearing.   HENT:      Head: Normocephalic and atraumatic.      Mouth/Throat:      Mouth: Mucous membranes are moist.      Pharynx: Oropharynx is clear.   Eyes:      Extraocular Movements: Extraocular movements intact.      Conjunctiva/sclera: Conjunctivae normal.   Cardiovascular:      Rate and Rhythm: Normal rate and regular rhythm.      Heart sounds: Normal heart sounds. No murmur heard.     No friction rub. No gallop.   Pulmonary:      Effort: Pulmonary effort is normal.      Breath sounds: Normal breath sounds. No wheezing or rales.   Abdominal:      General: Bowel sounds are normal. There is no distension.      Palpations: Abdomen is soft.      Tenderness: There is no abdominal tenderness. There is no guarding.      Comments: Abdominal incision site, c/d/i   Musculoskeletal:         General: No swelling or tenderness.      Right lower leg: No edema.      Left lower leg: No edema.      Comments: Contracted limbs    Skin:     General: Skin is warm and dry.      Capillary Refill: Capillary refill takes less than 2 seconds.   Neurological:      General: No focal deficit present.      Mental Status: He is alert. Mental status is at baseline.   Psychiatric:         Mood and Affect: Mood normal.           Intake/Output last 3 Shifts:  I/O last 3 completed shifts:  In: 150 (3.4 mL/kg) [IV Piggyback:150]  Out: 2001 (45.6 mL/kg) [Urine:2000 (1.3 mL/kg/hr); Stool:1]  Weight: " 43.9 kg     Relevant Results  Results for orders placed or performed during the hospital encounter of 10/05/24 (from the past 24 hour(s))   CBC   Result Value Ref Range    WBC 10.7 4.4 - 11.3 x10*3/uL    nRBC 0.0 0.0 - 0.0 /100 WBCs    RBC 4.89 4.50 - 5.90 x10*6/uL    Hemoglobin 12.5 (L) 13.5 - 17.5 g/dL    Hematocrit 39.8 (L) 41.0 - 52.0 %    MCV 81 80 - 100 fL    MCH 25.6 (L) 26.0 - 34.0 pg    MCHC 31.4 (L) 32.0 - 36.0 g/dL    RDW 14.5 11.5 - 14.5 %    Platelets 535 (H) 150 - 450 x10*3/uL   Basic Metabolic Panel   Result Value Ref Range    Glucose 303 (H) 74 - 99 mg/dL    Sodium 134 (L) 136 - 145 mmol/L    Potassium 4.2 3.5 - 5.3 mmol/L    Chloride 94 (L) 98 - 107 mmol/L    Bicarbonate 31 21 - 32 mmol/L    Anion Gap 13 10 - 20 mmol/L    Urea Nitrogen 19 6 - 23 mg/dL    Creatinine 0.54 0.50 - 1.30 mg/dL    eGFR >90 >60 mL/min/1.73m*2    Calcium 9.0 8.6 - 10.3 mg/dL       Imaging Results  MR brain w and wo IV contrast   Final Result   No acute infarct, hemorrhage or mass is noted.        Chronic foci of cystic encephalomalacia are noted along the corona   radiata with adjacent gliosis as well.        MACRO:   None        Signed by: Gm Vang 10/11/2024 7:21 AM   Dictation workstation:   [id number]      XR chest 1 view   Final Result   Coarse bilateral infiltrates, increased in the right upper chest   since 10/06/2024.        MACRO:   None.        Signed by: Madiha Elias 10/10/2024 8:15 AM   Dictation workstation:   [id number]      CT head wo IV contrast   Final Result   No acute intracranial abnormality.        Redemonstration of age disproportionate supratentorial volume loss   and white matter gliosis, most likely reflecting diffuse remote   cerebral insult.        MACRO:   None.        Signed by: Rafi Morrell 10/8/2024 10:01 PM   Dictation workstation:   [id number]      CT abdomen pelvis w IV contrast   Final Result   1. Confluent edema surrounding the baclofen pump which extends   distally to the  right groin and months pubis and right lateral   proximal thigh and slightly superiorly to the level of the mid   abdominal wall. There is no evidence of a discrete loculated fluid   collection. There has been resolution of previous abdominal wall air   from recent placement. This edema is nonspecific in the immediate   postoperative context and given that it was present immediately after   placement is most likely postoperative in nature. Continued clinical   follow-up recommended to ensure resolution.        2. Compared to CT 10/06/2024 there is significant worsening of now   confluent diffuse consolidation within the entirety of the visualized   right middle lobe and right lower lobe. There is also worsening of   consolidation throughout the lingula and left lower lobe that is   becoming more confluent and diffuse as well with background   ground-glass opacity. There is visualization of gastroesophageal   reflux into the distal esophagus. Differential diagnosis includes   worsening pneumonia, developing diffuse alveolar damage superimposed   upon pneumonia or, call pneumonitis from recent aspiration though   less likely given clear airways.        3. Worsening fluid distention of small and large bowel that could be   related to developing enteritis/colitis.        4. Large amount of impacted stool in the rectum which is distended up   to 8.2 cm similar prior study. Consider disimpaction to avoid   complication of stercoral colitis.        5. Redemonstration of intrahepatic portal venous-hepatic venous shunt   segment 7.        MACRO:   None.        Signed by: Rafi Morrell 10/8/2024 10:12 PM   Dictation workstation:   [id number]      XR chest 1 view   Final Result   1. Extensive bilateral multifocal airspace disease worse in the right   lung base. Underlying pneumonia and aspiration pneumonitis in the   differential                  MACRO:   None        Signed by: Anthony Silva 10/6/2024 6:42 PM    Dictation workstation:   [id number]      CT head wo IV contrast   Final Result   1. No acute intracranial hemorrhage or mass effect.   2. Prominent ventricles and extra-axial CSF spaces, reflecting   parenchymal volume loss.   3. Patchy hypoattenuation of the white matter which is favored to be   artifactual.        Signed by: Timothy Quinn 10/6/2024 1:13 AM   Dictation workstation:   [id number]      CT angio chest for pulmonary embolism   Final Result   1. Patchy right-greater-than-left lower lobe consolidative and   ground-glass opacities compatible with pneumonia. Debris within the   dependent trachea raising concern for aspiration.   2. No pulmonary embolism. Motion artifact limits evaluation of   subsegmental pulmonary arteries.   3. Right lower quadrant subcutaneous medication pump in place;   metallic streak artifact limits evaluation of adjacent structures.   There is soft tissue edema and soft tissue gas surrounding the pump   compatible with recent placement. No distinct organized fluid   collection is identified.             Signed by: Timothy Quinn 10/6/2024 1:27 AM   Dictation workstation:   [id number]      CT abdomen pelvis w IV contrast   Final Result   1. Patchy right-greater-than-left lower lobe consolidative and   ground-glass opacities compatible with pneumonia. Debris within the   dependent trachea raising concern for aspiration.   2. No pulmonary embolism. Motion artifact limits evaluation of   subsegmental pulmonary arteries.   3. Right lower quadrant subcutaneous medication pump in place;   metallic streak artifact limits evaluation of adjacent structures.   There is soft tissue edema and soft tissue gas surrounding the pump   compatible with recent placement. No distinct organized fluid   collection is identified.             Signed by: Timothy Quinn 10/6/2024 1:27 AM   Dictation workstation:   [id number]      XR chest 1 view   Final Result   The right lung is  asymmetrically lucent compared to the left. This   finding is nonspecific and may represent congenital lobar emphysema,   trapped air secondary to endobronchial obstruction, or left-sided   airspace disease. Recommend chest CT to better evaluate.        Prominent gas is present in the left upper quadrant, possibly in the   colon and stomach but extraluminal air is not excluded. This can also   be evaluated on the above recommended chest CT.        MACRO:   None        Signed by: Felix Devlin 10/6/2024 12:13 AM   Dictation workstation:   [id number]          Medications:  amLODIPine, 7.5 mg, g-tube, Daily  cannabidiol, 5 mg/kg, oral, BID  cloBAZam, 10 mg, g-tube, Nightly  cyanocobalamin, 500 mcg, g-tube, Daily  docusate sodium, 50 mg, g-tube, BID  ergocalciferol, 1,250 mcg, oral, Weekly  folic acid, 1 mg, oral, Daily  heparin (porcine), 5,000 Units, subcutaneous, q8h POPEYE  ipratropium-albuteroL, 3 mL, nebulization, q6h  lacosamide, 250 mg, g-tube, q12h POPEYE  levETIRAcetam, 750 mg, g-tube, BID  metoprolol tartrate, 50 mg, g-tube, BID  oxygen, , inhalation, Continuous - Inhalation  pantoprazole, 40 mg, oral, Daily before breakfast   Or  pantoprazole, 40 mg, intravenous, Daily before breakfast  piperacillin-tazobactam, 3.375 g, intravenous, q6h  polyethylene glycol, 17 g, g-tube, Daily  prednisoLONE, 40 mg, g-tube, BID  senna, 5 mL, g-tube, Nightly  sodium phosphates, 1 enema, rectal, Once       PRN medications: acetaminophen, acetaminophen **OR** [DISCONTINUED] acetaminophen **OR** acetaminophen, bisacodyl, glycopyrrolate, hydrALAZINE, ipratropium-albuteroL, ondansetron **OR** ondansetron, oxyCODONE        Assessment/Plan          Principal Problem:    Aspiration pneumonia (Multi)    Acute Hypoxic Respiratory Failure, not on O2 at home   Sepsis due to aspiration pneumonia  - Continue IV Zosyn, ID following   - steroids per pulm recs  - s/p IV Lasix x 1 dose  - Pulm and RT consulted; will need aggressive pulmonary  hygiene, vaishali nebs. NT Suctioning bid, Vest therapy.   - wean O2 for goal o2 sat 88-92%     Abnormal eye movements  -Nocturnist called as pt was looking toward his left often, mom concerned for stroke  -CT head negative  -Neuro consulted: Appreciate evaluation recommendations  -MRI brain, resulted negative for intracranial process     Sinus tachycardia  -Improved with change of metoprolol to 50 twice daily  - due to above; treat underlying issues not HR  - Monitor on telemetry     Cerebral palsy with spastic quadriplegia  Seizure disorder  - continue cannabidiol, onfi, vimpat, keppra  - recent baclofen pump placement     GERD  - continue ppi     HTN  -Acceptable systolic, but elevated diastolic  - continue norvasc, metoprolol ( increased to 50 twice daily)     Constipation, resolved   - Scheduled stool softeners and laxative  - As needed enema/suppositories    DVT Prophylaxis:  Heparin subq    Disposition:  Anticipate DC in the next day or so if can wean off O2.     Shiva Burgos, DO [facility]

## 2024-10-14 NOTE — NURSING NOTE
Upon arrival the patient laying in bed alert, , tracks with eyes, nonverbal does not follow commands, vss, incontinent, b

## 2024-10-14 NOTE — PROGRESS NOTES
10/14/24 0912   Discharge Planning   Expected Discharge Disposition Home H     Once med ready plan would be to discharge home, with his mother, as she is the primary caregiver , per notes she does have all the TF equipment at home since he has been on TF before, will need a script for home going TF, I did reach out to [facility] to see if they would be able to deliver TF. Patient is currently on Jevity 1.5 at 40ml/hr, I will continue to monitor for discharge planning.

## 2024-10-14 NOTE — PROGRESS NOTES
Vargas Romero is a 29 y.o. male on day 8 of admission presenting with Aspiration pneumonia (Multi).  Patient with h/o cerebral palsy with spastic quadriplegia, s/p PEG placement, who p/w productive cough associated with change in mental status. In the ED found to be hypoxic, requiring 6L NC. Chest imaging showed bibasilar consolidation and debris in trach. Admitted to [facility] with the diagnosis of aspiration pneumonia/pneumonitis.   Subjective   No acute overnight events. O2 requirements stable at 1-2L.      More awake today, tracks with eyes, but cannot provide history.   Objective   Scheduled medications  amLODIPine, 7.5 mg, g-tube, Daily  cannabidiol, 5 mg/kg, oral, BID  cloBAZam, 10 mg, g-tube, Nightly  cyanocobalamin, 500 mcg, g-tube, Daily  docusate sodium, 50 mg, g-tube, BID  ergocalciferol, 1,250 mcg, oral, Weekly  folic acid, 1 mg, oral, Daily  heparin (porcine), 5,000 Units, subcutaneous, q8h POPEYE  ipratropium-albuteroL, 3 mL, nebulization, q6h  lacosamide, 250 mg, g-tube, q12h POPEYE  levETIRAcetam, 750 mg, g-tube, BID  metoprolol tartrate, 50 mg, g-tube, BID  oxygen, , inhalation, Continuous - Inhalation  pantoprazole, 40 mg, oral, Daily before breakfast   Or  pantoprazole, 40 mg, intravenous, Daily before breakfast  piperacillin-tazobactam, 3.375 g, intravenous, q6h  polyethylene glycol, 17 g, g-tube, Daily  prednisoLONE, 40 mg, g-tube, BID  senna, 5 mL, g-tube, Nightly  sodium phosphates, 1 enema, rectal, Once    Continuous medications     PRN medications  PRN medications: acetaminophen, acetaminophen **OR** [DISCONTINUED] acetaminophen **OR** acetaminophen, bisacodyl, glycopyrrolate, hydrALAZINE, ipratropium-albuteroL, ondansetron **OR** ondansetron, oxyCODONE   Physical Exam  Constitutional:       General: He is not in acute distress.     Appearance: He is ill-appearing. He is not toxic-appearing.      Comments: Thin   HENT:      Head: Normocephalic and atraumatic.      Nose:      Comments: On 1-2L NC      "Mouth/Throat:      Mouth: Mucous membranes are moist.   Eyes:      General: No scleral icterus.     Extraocular Movements: Extraocular movements intact.      Pupils: Pupils are equal, round, and reactive to light.   Cardiovascular:      Rate and Rhythm: Normal rate and regular rhythm.      Heart sounds: No murmur heard.     No friction rub. No gallop.   Pulmonary:      Effort: Pulmonary effort is normal. No respiratory distress.      Breath sounds: Rales (bibasilar) present. No wheezing.   Abdominal:      General: Abdomen is flat. Bowel sounds are normal. There is no distension.      Palpations: Abdomen is soft.      Tenderness: There is no abdominal tenderness.   Musculoskeletal:      Cervical back: Neck supple. No rigidity.      Right lower leg: No edema.      Left lower leg: No edema.   Lymphadenopathy:      Cervical: No cervical adenopathy.   Skin:     General: Skin is warm and dry.      Coloration: Skin is not jaundiced.   Neurological:      Comments: Cannot fully assess, awake but unclear if alert.    Psychiatric:      Comments: Cannot fully assess, awake but unclear if alert.      Last Recorded Vitals  Blood pressure 156/83, pulse 102, temperature 36.4 °C (97.5 °F), temperature source Oral, resp. rate 10, height 1.6 m (5' 2.99\"), weight (!) 43.9 kg (96 lb 12.5 oz), SpO2 94%.  Intake/Output last 3 Shifts:  I/O last 3 completed shifts:  In: 150 (3.4 mL/kg) [IV Piggyback:150]  Out: 2001 (45.6 mL/kg) [Urine:2000 (1.3 mL/kg/hr); Stool:1]  Weight: 43.9 kg   Relevant Results  Results for orders placed or performed during the hospital encounter of 10/05/24 (from the past 24 hour(s))   CBC   Result Value Ref Range    WBC 10.7 4.4 - 11.3 x10*3/uL    nRBC 0.0 0.0 - 0.0 /100 WBCs    RBC 4.89 4.50 - 5.90 x10*6/uL    Hemoglobin 12.5 (L) 13.5 - 17.5 g/dL    Hematocrit 39.8 (L) 41.0 - 52.0 %    MCV 81 80 - 100 fL    MCH 25.6 (L) 26.0 - 34.0 pg    MCHC 31.4 (L) 32.0 - 36.0 g/dL    RDW 14.5 11.5 - 14.5 %    Platelets 535 (H) " 150 - 450 x10*3/uL   Basic Metabolic Panel   Result Value Ref Range    Glucose 303 (H) 74 - 99 mg/dL    Sodium 134 (L) 136 - 145 mmol/L    Potassium 4.2 3.5 - 5.3 mmol/L    Chloride 94 (L) 98 - 107 mmol/L    Bicarbonate 31 21 - 32 mmol/L    Anion Gap 13 10 - 20 mmol/L    Urea Nitrogen 19 6 - 23 mg/dL    Creatinine 0.54 0.50 - 1.30 mg/dL    eGFR >90 >60 mL/min/1.73m*2    Calcium 9.0 8.6 - 10.3 mg/dL   Electrocardiogram, 12-lead PRN ACS symptoms    Result Date: 10/14/2024  Sinus tachycardia Possible Left atrial enlargement Borderline ECG When compared with ECG of 06-OCT-2024 17:56, T wave amplitude has increased in Inferior leads T wave amplitude has increased in Anterolateral leads    Assessment/Plan   Assessment & Plan  Aspiration pneumonia (Multi)  Most likely related to inability to maintain airway post-procedurally (baclofen pump on 10/4/24); CT with bibasilar opacities, right greater than left; Pro-Stalin 4.69; febrile and tachycardic  29 YOM with h/o cerebral palsy with spastic quadriplegia, s/p PEG placement, who p/w productive cough associated with change in mental status. In the ED found to be hypoxic, requiring 6L NC. Chest imaging showed bibasilar consolidation and debris in trach. Admitted to [facility] with the diagnosis of aspiration pneumonia/pneumonitis.     Respiratory failure: acute with hypoxia. Due to aspiration pneumonia/pneumonitis +/- atelectasis. Overall is improving with need reducing to 1-2L      Continue supplemental O2, wean off as tolerates      Home O2 evaluation before DC      Bronchopulmonary hygiene with mechanical vest; NT suctioning Q6 as needed; frequent oral suctioning      Continue Duo-Neb      Management of the individual causes as below     Aspiration pneumonia:      Continue Zosyn      Aspiration precaution    Possible pneumonitis:      Continue Prednisone BID    DVT prophylaxis: subcutaneous heparin     Pulmonary will FU while in house.        Sherrill Peraza MD

## 2024-10-15 PROCEDURE — 99239 HOSP IP/OBS DSCHRG MGMT >30: CPT | Performed by: INTERNAL MEDICINE

## 2024-10-15 PROCEDURE — 2500000001 HC RX 250 WO HCPCS SELF ADMINISTERED DRUGS (ALT 637 FOR MEDICARE OP): Performed by: INTERNAL MEDICINE

## 2024-10-15 PROCEDURE — 2500000005 HC RX 250 GENERAL PHARMACY W/O HCPCS: Performed by: HOSPITALIST

## 2024-10-15 PROCEDURE — 1100000001 HC PRIVATE ROOM DAILY

## 2024-10-15 PROCEDURE — 2500000004 HC RX 250 GENERAL PHARMACY W/ HCPCS (ALT 636 FOR OP/ED): Performed by: HOSPITALIST

## 2024-10-15 PROCEDURE — 2500000004 HC RX 250 GENERAL PHARMACY W/ HCPCS (ALT 636 FOR OP/ED): Performed by: INTERNAL MEDICINE

## 2024-10-15 PROCEDURE — 94669 MECHANICAL CHEST WALL OSCILL: CPT

## 2024-10-15 PROCEDURE — 2500000004 HC RX 250 GENERAL PHARMACY W/ HCPCS (ALT 636 FOR OP/ED): Performed by: NURSE PRACTITIONER

## 2024-10-15 PROCEDURE — 94640 AIRWAY INHALATION TREATMENT: CPT

## 2024-10-15 PROCEDURE — 2500000005 HC RX 250 GENERAL PHARMACY W/O HCPCS: Performed by: NURSE PRACTITIONER

## 2024-10-15 PROCEDURE — 2500000001 HC RX 250 WO HCPCS SELF ADMINISTERED DRUGS (ALT 637 FOR MEDICARE OP)

## 2024-10-15 PROCEDURE — 2500000001 HC RX 250 WO HCPCS SELF ADMINISTERED DRUGS (ALT 637 FOR MEDICARE OP): Performed by: NURSE PRACTITIONER

## 2024-10-15 PROCEDURE — 94668 MNPJ CHEST WALL SBSQ: CPT

## 2024-10-15 PROCEDURE — 2500000002 HC RX 250 W HCPCS SELF ADMINISTERED DRUGS (ALT 637 FOR MEDICARE OP, ALT 636 FOR OP/ED): Performed by: INTERNAL MEDICINE

## 2024-10-15 PROCEDURE — 99232 SBSQ HOSP IP/OBS MODERATE 35: CPT | Performed by: CLINICAL NURSE SPECIALIST

## 2024-10-15 RX ORDER — AMOXICILLIN AND CLAVULANATE POTASSIUM 875; 125 MG/1; MG/1
1 TABLET, FILM COATED ORAL 2 TIMES DAILY
Qty: 8 TABLET | Refills: 0 | Status: SHIPPED | OUTPATIENT
Start: 2024-10-15 | End: 2024-10-16

## 2024-10-15 RX ORDER — METOPROLOL TARTRATE 50 MG/1
50 TABLET ORAL 2 TIMES DAILY
Qty: 60 TABLET | Refills: 0 | Status: SHIPPED | OUTPATIENT
Start: 2024-10-15 | End: 2024-10-16

## 2024-10-15 RX ORDER — PREDNISOLONE SODIUM PHOSPHATE 15 MG/5ML
40 SOLUTION ORAL 2 TIMES DAILY
Qty: 108 ML | Refills: 0 | Status: SHIPPED | OUTPATIENT
Start: 2024-10-15 | End: 2024-10-16

## 2024-10-15 ASSESSMENT — PAIN SCALES - PAIN ASSESSMENT IN ADVANCED DEMENTIA (PAINAD)
BODYLANGUAGE: RELAXED
BODYLANGUAGE: RELAXED
TOTALSCORE: MEDICATION (SEE MAR);REPOSITIONED
BODYLANGUAGE: RELAXED
FACIALEXPRESSION: SMILING OR INEXPRESSIVE
BREATHING: OCCASIONAL LABORED BREATHING, SHORT PERIOD OF HYPERVENTILATION
NEGVOCALIZATION: OCCASIONAL MOAN/GROAN, LOW SPEECH, NEGATIVE/DISAPPROVING QUALITY
BREATHING: NORMAL
CONSOLABILITY: DISTRACTED OR REASSURED BY VOICE/TOUCH
CONSOLABILITY: NO NEED TO CONSOLE
FACIALEXPRESSION: SMILING OR INEXPRESSIVE
TOTALSCORE: 4
TOTALSCORE: 0
CONSOLABILITY: NO NEED TO CONSOLE
BREATHING: NORMAL
FACIALEXPRESSION: SAD, FRIGHTENED, FROWN
TOTALSCORE: 0

## 2024-10-15 ASSESSMENT — COGNITIVE AND FUNCTIONAL STATUS - GENERAL
TOILETING: TOTAL
DRESSING REGULAR UPPER BODY CLOTHING: TOTAL
DAILY ACTIVITIY SCORE: 6
MOVING FROM LYING ON BACK TO SITTING ON SIDE OF FLAT BED WITH BEDRAILS: TOTAL
MOBILITY SCORE: 6
MOBILITY SCORE: 6
TURNING FROM BACK TO SIDE WHILE IN FLAT BAD: TOTAL
HELP NEEDED FOR BATHING: TOTAL
WALKING IN HOSPITAL ROOM: TOTAL
DRESSING REGULAR UPPER BODY CLOTHING: TOTAL
TOILETING: TOTAL
HELP NEEDED FOR BATHING: TOTAL
DAILY ACTIVITIY SCORE: 6
MOVING FROM LYING ON BACK TO SITTING ON SIDE OF FLAT BED WITH BEDRAILS: TOTAL
DRESSING REGULAR LOWER BODY CLOTHING: TOTAL
PERSONAL GROOMING: TOTAL
MOVING TO AND FROM BED TO CHAIR: TOTAL
DRESSING REGULAR LOWER BODY CLOTHING: TOTAL
TURNING FROM BACK TO SIDE WHILE IN FLAT BAD: TOTAL
CLIMB 3 TO 5 STEPS WITH RAILING: TOTAL
WALKING IN HOSPITAL ROOM: TOTAL
MOBILITY SCORE: 6
DRESSING REGULAR LOWER BODY CLOTHING: TOTAL
STANDING UP FROM CHAIR USING ARMS: TOTAL
HELP NEEDED FOR BATHING: TOTAL
PERSONAL GROOMING: TOTAL
TOILETING: TOTAL
TURNING FROM BACK TO SIDE WHILE IN FLAT BAD: TOTAL
STANDING UP FROM CHAIR USING ARMS: TOTAL
DRESSING REGULAR UPPER BODY CLOTHING: TOTAL
MOVING TO AND FROM BED TO CHAIR: TOTAL
STANDING UP FROM CHAIR USING ARMS: TOTAL
PERSONAL GROOMING: TOTAL
MOVING FROM LYING ON BACK TO SITTING ON SIDE OF FLAT BED WITH BEDRAILS: TOTAL
CLIMB 3 TO 5 STEPS WITH RAILING: TOTAL
EATING MEALS: TOTAL
DAILY ACTIVITIY SCORE: 6
EATING MEALS: TOTAL
EATING MEALS: TOTAL
WALKING IN HOSPITAL ROOM: TOTAL
CLIMB 3 TO 5 STEPS WITH RAILING: TOTAL
MOVING TO AND FROM BED TO CHAIR: TOTAL

## 2024-10-15 ASSESSMENT — PAIN SCALES - WONG BAKER
WONGBAKER_NUMERICALRESPONSE: NO HURT
WONGBAKER_NUMERICALRESPONSE: NO HURT

## 2024-10-15 ASSESSMENT — PAIN SCALES - GENERAL: PAINLEVEL_OUTOF10: 0 - NO PAIN

## 2024-10-15 ASSESSMENT — PAIN - FUNCTIONAL ASSESSMENT: PAIN_FUNCTIONAL_ASSESSMENT: 0-10

## 2024-10-15 NOTE — PROGRESS NOTES
For follow-up of:  Aspiration pneumonia    Subjective   Interval History:  He is sleeping and arousable.  He is breathing comfortably on 1 L/min nasal cannula oxygen.       Objective     Current Facility-Administered Medications   Medication Dose Route Frequency Provider Last Rate Last Admin    acetaminophen (Tylenol) oral liquid 650 mg  650 mg oral q4h PRN Ritu Baldwin, Jamal   650 mg at 10/14/24 0242    acetaminophen (Tylenol) tablet 650 mg  650 mg oral q4h PRN Marcos G Salomone, DO   650 mg at 10/09/24 1111    Or    acetaminophen (Tylenol) suppository 650 mg  650 mg rectal q4h PRN Marcos G Salomone, DO   650 mg at 10/06/24 2046    amLODIPine (Norvasc) tablet 7.5 mg  7.5 mg g-tube Daily LIBERTAD Jhaveri-CNP   7.5 mg at 10/15/24 0939    bisacodyl (Dulcolax) EC tablet 5 mg  5 mg oral Daily PRN Marcos G Salomone, DO        cannabidiol (Epidiolex) solution 220 mg - PATIENT OWN MEDICATION  5 mg/kg oral BID Octavio Goudiaby, DO   220 mg at 10/15/24 1005    cloBAZam (Onfi) tablet 10 mg  10 mg g-tube Nightly Krystle Herrera PharmD   10 mg at 10/14/24 2139    cyanocobalamin (Vitamin B-12) tablet 500 mcg  500 mcg g-tube Daily LIBERTAD Jhaveri-CNP   500 mcg at 10/15/24 0939    docusate sodium (Colace) oral liquid 50 mg  50 mg g-tube BID LIBERTAD Jhaveri-CNP   50 mg at 10/15/24 0958    ergocalciferol (Vitamin D-2) capsule 1,250 mcg  1,250 mcg oral Weekly LIBERTAD Jhaveri-CNP   1,250 mcg at 10/13/24 1106    folic acid (Folvite) tablet 1 mg  1 mg oral Daily Marcos G Salomone, DO   1 mg at 10/15/24 0958    glycopyrrolate PF (Robinul) 0.2 mg/mL injection 0.2 mg  0.2 mg intravenous q4h PRN Tati White MD   0.2 mg at 10/13/24 2247    heparin (porcine) injection 5,000 Units  5,000 Units subcutaneous q8h POPEYE Marcos Mercado DO   5,000 Units at 10/15/24 0639    hydrALAZINE (Apresoline) injection 10 mg  10 mg intravenous q6h PRN Sandrita Fair MD   10 mg at 10/15/24 0343    ipratropium-albuteroL (Duo-Neb) 0.5-2.5 mg/3 mL  nebulizer solution 3 mL  3 mL nebulization q6h Marcos G Salomone, DO   3 mL at 10/15/24 0813    ipratropium-albuteroL (Duo-Neb) 0.5-2.5 mg/3 mL nebulizer solution 3 mL  3 mL nebulization q2h PRN Marcos G Salomone, DO        lacosamide (Vimpat) oral liquid 250 mg  250 mg g-tube q12h POPEYE LIBERTAD Jhaveri-CNP   250 mg at 10/15/24 0944    levETIRAcetam (Keppra) tablet 750 mg  750 mg g-tube BID Adriel Lock APRN-CNP   750 mg at 10/15/24 0939    metoprolol tartrate (Lopressor) tablet 50 mg  50 mg g-tube BID Imer NelsonD   50 mg at 10/15/24 0939    ondansetron (Zofran) tablet 4 mg  4 mg oral q8h PRN Marcos G Salomone, DO        Or    ondansetron (Zofran) injection 4 mg  4 mg intravenous q8h PRN Marcos G Salomone, DO   4 mg at 10/13/24 2247    oxyCODONE (Roxicodone) immediate release tablet 5 mg  5 mg oral q6h PRN Marcos G Salomone, DO   5 mg at 10/15/24 0427    oxygen (O2) therapy   inhalation Continuous - Inhalation Deep Coronel, DO   1 L/min at 10/15/24 0820    pantoprazole (ProtoNix) EC tablet 40 mg  40 mg oral Daily before breakfast Marcos G Salomone, DO   40 mg at 10/13/24 0600    Or    pantoprazole (ProtoNix) injection 40 mg  40 mg intravenous Daily before breakfast Marcos G Salomone, DO   40 mg at 10/15/24 0639    piperacillin-tazobactam (Zosyn) 3.375 g in dextrose (iso) IV 50 mL  3.375 g intravenous q6h Marcos G Salomone, DO   Stopped at 10/15/24 0722    polyethylene glycol (Glycolax, Miralax) packet 17 g  17 g g-tube Daily Adriel Lock APRN-CNP   17 g at 10/15/24 0939    prednisoLONE sodium phosphate (OrapRED) oral solution 40 mg  40 mg g-tube BID LIBERTAD Jhaveri-CNP   40 mg at 10/14/24 2139    senna (Senokot) 8.8 mg/5 mL syrup 5 mL  5 mL g-tube Nightly Adriel Lock APRN-CNP   5 mL at 10/14/24 2143    sodium phosphates (Fleets) 19-7 gram/118 mL enema 1 enema  1 enema rectal Once Octavio Goudiaby, DO            Last Recorded Vitals  /70 (BP Location: Right arm, Patient Position: Lying)   Pulse (!) 114    Temp 36.4 °C (97.5 °F) (Oral)   Resp 19   Wt (!) 43.9 kg (96 lb 12.5 oz)   SpO2 98%   General: no acute distress, lying in bed, sleeping and arousable  HEENT: pharynx not examined  CVS: RRR  Resp: decreased in the bases, wearing nasal cannula oxygen  ABD: soft, NT, ND, PEG  : External urinary catheter  EXT: no edema  Skin: Right lower quadrant surgical site    Relevant Results:    Labs:   Results from last 72 hours   Lab Units 10/14/24  0643 10/13/24  0738   SODIUM mmol/L 134* 138   POTASSIUM mmol/L 4.2 4.1   CHLORIDE mmol/L 94* 96*   BUN mg/dL 19 20   CREATININE mg/dL 0.54 0.52   PHOSPHORUS mg/dL  --  2.7     Results from last 72 hours   Lab Units 10/14/24  0643 10/13/24  0738   WBC AUTO x10*3/uL 10.7 10.9   HEMOGLOBIN g/dL 12.5* 12.9*   HEMATOCRIT % 39.8* 40.3*   PLATELETS AUTO x10*3/uL 535* 468*       Microbiology data: I have personally and independently reviewed and intrepreted the lab results  10/5/2024 blood culture show no growth  10/6/2024 MRSA swab negative  10/6/2024 urine Legionella antigen negative; urine pneumococcal antigen negative  10/8/2024 MRSA swab negative  10/8/2024 sputum culture was a poor sample contaminated with saliva    Imaging data: I have personally and independently reviewed and interpreted the imaging studies  10/5/2024 chest x-ray shows lung infiltrates  10/5/2024 head CT shows no acute issues  10/5/2024 CT chest abdomen pelvis shows right more than left infiltrates with debris in the trachea; there is soft tissue gas around the newly inserted pump  10/6/2024 chest x-ray shows bilateral infiltrates  10/8/2024 CT head shows no acute issues  10/8/2024 CT abdomen pelvis shows postoperative fluid at the baclofen pump; there is increased and more confluent right middle lobe and right lower lobe infiltrate; there is fluid distention of the bowel with constipation  10/10/2024 MRI brain showed no acute issues    Assessment/Plan     MY IMPRESSION & RECOMMENDATIONS:  Aspiration pneumonia,  for which he remains on IV antibiotics.  I have personally and independently reviewed and interpreted the laboratory tests, imaging studies, and the documentation from other healthcare providers.  I am monitoring for side effects from Zosyn as outlined in the previous note.  His acute hypoxic respiratory failure is being managed with oxygen supplementation.  When he is ready for discharge, he can go on Augmentin through the PEG tube up through 10/19/2024.    -Continue Zosyn while hospitalized  -Can discharge when ready on Augmentin 875 mg twice daily through his PEG for approximately 4 more days     Other issues:  #Acute metabolic encephalopathy, which we are monitoring  #Cerebral palsy with spastic quadriplegia status post PEG and baclofen pump  #Seizure disorder  #GERD  #Hypertension       Micah Padilla MD

## 2024-10-15 NOTE — CARE PLAN
The patient's goals for the shift include      The clinical goals for the shift include Maintain pt. comfort    Over the shift, the patient did not make progress toward the following goals. Barriers to progression include lack of mobility. Recommendations to address these barriers include reposition every 2 hours.

## 2024-10-15 NOTE — PROGRESS NOTES
10/15/24 0805   Discharge Planning   Expected Discharge Disposition Home     Once med ready plan is to discharge home , patient's mother is his primary caregiver, ID and Pulm following patient on 1l of oxygen, I did reach out to [facility] to see if a home oxygen eval has been completed, patient may need to discharge home on oxygen if unable to wean off. Per prior notes patient's mother has all of the TF equipment and has TF at home , I will continue to monitor for discharge planning and home going needs.

## 2024-10-15 NOTE — PROGRESS NOTES
Vargas Romero is a 29 y.o. male on day 9 of admission presenting with Aspiration pneumonia (Multi).  Patient with h/o cerebral palsy with spastic quadriplegia, s/p PEG placement, who p/w productive cough associated with change in mental status. In the ED found to be hypoxic, requiring 6L NC. Chest imaging showed bibasilar consolidation and debris in trach. Admitted to [facility] with the diagnosis of aspiration pneumonia/pneumonitis.     Subjective   No acute overnight events. Weaned to room air this morning. Pox 92-97%. Continues to look to care givers with intermittent tracking. Non-verbal. Likely discharge today.     Objective   Physical Exam     Constitutional:   Thin, chronically ill, NAD  HENT: Atraumatic, moist mucous membranes  Eyes: Nonicteric  Neck: Supple  Cardiovascular: S1, S2 normal, tachycardic, HR 90s, no murmur appreciated  Pulmonary: Fair air entry with moderate bibasilar crackles, clear upper fields, overall improved WOB; weak cough, RR ~12/min  Abdominal: semi-soft, non distended, + BS  Musculoskeletal: Permanent contractures; diffuse muscle wasting  Extremities: No BLE edema  Lymphadenopathy: No nuchal LAP  Skin: Warm, semi-moist; sacral mepilex  Neurological: Awake, nonverbal, unable to follow commands; more responsive to people and situations today     Scheduled medications  amLODIPine, 7.5 mg, g-tube, Daily  cannabidiol, 5 mg/kg, oral, BID  cloBAZam, 10 mg, g-tube, Nightly  cyanocobalamin, 500 mcg, g-tube, Daily  docusate sodium, 50 mg, g-tube, BID  ergocalciferol, 1,250 mcg, oral, Weekly  folic acid, 1 mg, oral, Daily  heparin (porcine), 5,000 Units, subcutaneous, q8h POPEYE  ipratropium-albuteroL, 3 mL, nebulization, q6h  lacosamide, 250 mg, g-tube, q12h POPEYE  levETIRAcetam, 750 mg, g-tube, BID  metoprolol tartrate, 50 mg, g-tube, BID  oxygen, , inhalation, Continuous - Inhalation  pantoprazole, 40 mg, oral, Daily before breakfast   Or  pantoprazole, 40 mg, intravenous, Daily before  "breakfast  piperacillin-tazobactam, 3.375 g, intravenous, q6h  polyethylene glycol, 17 g, g-tube, Daily  prednisoLONE, 40 mg, g-tube, BID  senna, 5 mL, g-tube, Nightly  sodium phosphates, 1 enema, rectal, Once    Continuous medications     PRN medications  PRN medications: acetaminophen, acetaminophen **OR** [DISCONTINUED] acetaminophen **OR** acetaminophen, bisacodyl, glycopyrrolate, hydrALAZINE, ipratropium-albuteroL, ondansetron **OR** ondansetron, oxyCODONE   Last Recorded Vitals  Blood pressure 132/78, pulse 91, temperature 36.9 °C (98.5 °F), temperature source Axillary, resp. rate 18, height 1.6 m (5' 2.99\"), weight (!) 43.9 kg (96 lb 12.5 oz), SpO2 96%.  Intake/Output last 3 Shifts:  I/O last 3 completed shifts:  In: 150 (3.4 mL/kg) [IV Piggyback:150]  Out: 1801 (41 mL/kg) [Urine:1800 (1.1 mL/kg/hr); Stool:1]  Weight: 43.9 kg   Relevant Results  Results for orders placed or performed during the hospital encounter of 10/05/24 (from the past 24 hour(s))   POCT GLUCOSE   Result Value Ref Range    POCT Glucose 289 (H) 74 - 99 mg/dL   No results found.   Assessment/Plan   Assessment & Plan  Aspiration pneumonia (Multi)  Most likely related to inability to maintain airway post-procedurally (baclofen pump on 10/4/24); CT with bibasilar opacities, right greater than left; Pro-Stalin 4.69; febrile and tachycardic  29 YOM with h/o cerebral palsy with spastic quadriplegia, s/p PEG placement, who p/w productive cough associated with change in mental status. In the ED found to be hypoxic, requiring 6L NC. Chest imaging showed bibasilar consolidation and debris in trach. Admitted to [facility] with the diagnosis of aspiration pneumonia/pneumonitis.     Respiratory failure: acute with hypoxia. Due to aspiration pneumonia/pneumonitis +/- atelectasis. Overall is improving, weaned to room air today      Continue supplemental O2, wean off as tolerates      Home O2 evaluation before DC      Bronchopulmonary hygiene with mechanical vest; " NT suctioning Q6 as needed; frequent oral suctioning      Continue Duo-Neb      Management of the individual causes as below     Aspiration pneumonia: RN spoke with mom who stated he has taken meds by mouth in the past      Continue Zosyn      Aspiration precaution      Keep NPO and recommend outpatient swallow evaluation          Possible pneumonitis:      Continue Prednisone BID    DVT prophylaxis: subcutaneous heparin     Pulmonary will FU while in house.     I spent 35 minutes in the professional and overall care of this patient.    Corinne Wood, APRN-CNS

## 2024-10-15 NOTE — NURSING NOTE
Four eye skin check completed with Shaea CTA, protective mepilex's to coccyx and hips, patient has boots on feet, waffle mattress in place. No areas of pressure injuries of concern.

## 2024-10-15 NOTE — PROGRESS NOTES
Nutrition Follow-up Note     Chart reviewed and pt visited.  TF running at goal rate, per RN pt tolerating.  Updated weight taken. Significant weight loss noted.    Results for orders placed or performed during the hospital encounter of 10/05/24 (from the past 24 hour(s))   POCT GLUCOSE   Result Value Ref Range    POCT Glucose 289 (H) 74 - 99 mg/dL     Scheduled medications  amLODIPine, 7.5 mg, g-tube, Daily  cannabidiol, 5 mg/kg, oral, BID  cloBAZam, 10 mg, g-tube, Nightly  cyanocobalamin, 500 mcg, g-tube, Daily  docusate sodium, 50 mg, g-tube, BID  ergocalciferol, 1,250 mcg, oral, Weekly  folic acid, 1 mg, oral, Daily  heparin (porcine), 5,000 Units, subcutaneous, q8h POPEYE  ipratropium-albuteroL, 3 mL, nebulization, q6h  lacosamide, 250 mg, g-tube, q12h POPEYE  levETIRAcetam, 750 mg, g-tube, BID  metoprolol tartrate, 50 mg, g-tube, BID  oxygen, , inhalation, Continuous - Inhalation  pantoprazole, 40 mg, oral, Daily before breakfast   Or  pantoprazole, 40 mg, intravenous, Daily before breakfast  piperacillin-tazobactam, 3.375 g, intravenous, q6h  polyethylene glycol, 17 g, g-tube, Daily  prednisoLONE, 40 mg, g-tube, BID  senna, 5 mL, g-tube, Nightly  sodium phosphates, 1 enema, rectal, Once      Continuous medications     PRN medications  PRN medications: acetaminophen, acetaminophen **OR** [DISCONTINUED] acetaminophen **OR** acetaminophen, bisacodyl, glycopyrrolate, hydrALAZINE, ipratropium-albuteroL, ondansetron **OR** ondansetron, oxyCODONE  Dietary Orders (From admission, onward)       Start     Ordered    10/06/24 0224  Enteral feeding with NPO 40; 200; Water; Tap water; Every 6 hours  Diet effective now        Question Answer Comment   Tube feeding formula: Jevity 1.5    Tube feeding continuous rate (mL/hr): 40    Tube feeding flush (mL): 200    Flush type: Water    Water type: Tap water    Flush frequency: Every 6 hours        10/06/24 0227                    History:  Food and Nutrient History  Energy Intake:  "Good > 75 %  Food and Nutrient History: Jevity 1.5, goal rate 40ml/hr h2o flush 200 x4    Anthropometrics:  Height: 160 cm (5' 2.99\")  Weight: (!) 37.9 kg (83 lb 8 oz)  BMI (Calculated): 14.79    Weight Change  Weight History / % Weight Change: 43.9kg 10/6/24; no weight history; last known weight 42.9kg 9/14/23  Significant Weight Loss: Yes  Interpretation of Weight Loss: >5% in 1 month    IBW/kg (Dietitian Calculated): 56.4 kg    Estimated Energy Needs  Total Energy Estimated Needs (kCal): 1410 kCal  Total Estimated Energy Need per Day (kCal/kg): 1560 kCal/kg  Method for Estimating Needs: 25-28 IBW    Estimated Protein Needs  Total Protein Estimated Needs (g): 45 g  Total Protein Estimated Needs (g/kg): 55 g/kg  Method for Estimating Needs: 0.8-1.0 IBW    Estimated Fluid Needs  Method for Estimating Needs: 1ml/kcal or per MD    Nutrition Focused Physical Findings:  Subcutaneous Fat Loss  Orbital Fat Pads: Well nourished (slightly bulging fat pads)  Buccal Fat Pads: Well nourished (full, rounded cheeks)    Muscle Wasting  Temporalis: Mild-Moderate (slight depression)  Pectoralis (Clavicular Region): Mild-Moderate (some protrusion of clavicle)    Edema  Edema: none    Physical Findings (Nutrition Deficiency/Toxicity)  Skin: Positive (abdominal incision)     Nutrition Diagnosis   Malnutrition Diagnosis  Patient has Malnutrition Diagnosis: Yes  Diagnosis Status: New  Malnutrition Diagnosis: Moderate malnutrition related to acute disease or injury  As Evidenced by: > 5% weight loss in 1 month and mild muscle wasting present    Patient has Nutrition Diagnosis: Yes  Nutrition Diagnosis 1: Inadequate oral intake  Diagnosis Status (1): Ongoing  Related to (1): chronic illness  As Evidenced by (1): pt NPO requiring enteral feeds to meet nutritional needs       Nutrition Diagnosis 2: Underweight  Diagnosis Status (2): Ongoing  Related to (2): chronic illness  As Evidenced by (2): BMI 14.8       Nutrition " Interventions/Recommendations   Food and/or Nutrient Delivery Interventions  Interventions: Enteral intake    Enteral Intake: Enteral nutrition site care, Feeding tube flush  Goal: Continue TF as ordered to cover 100% of pts nutritional needs: Jevity 1.5 at 40ml/hr continuous provides: 1440kcal, 61.2g protein & 730ml free h2o. Current flush prder 200 x4. total h20: 1530ml    Coordination of Nutrition Care by a Nutrition Professional  Collaboration and Referral of Nutrition Care: Collaboration by nutrition professional with other providers    Education Documentation  No documentation found.      Nutrition Monitoring and Evaluation   Food and Nutrient Related History  Enteral and Parenteral Nutrition Intake: Enteral nutrition formula/solution  Criteria: Monitor TF tolerance; Contact nutrition services for intolerances    Anthropometrics: Body Composition/Growth/Weight History  Weight Change: Weight gain, Weight loss    Biochemical Data, Medical Tests and Procedures  Electrolyte and Renal Panel: Other (Comment)  Criteria: as clinically indicated    Gastrointestinal Profile: Other (Comment)  Criteria: as clinically indicated    Glucose/Endocrine Profile: Other (Comment)  Criteria: as clinically indicated    Nutritional Anemia Profile: Other (Comment)  Criteria: as clinically indicated    Vitamin Profile: Other (Comment)  Criteria: as clinically indicated    Nutrition Focused Physical Findings  Digestive System: Other (Comment)  Criteria: as clinically indicated    Muscles: Muscle atrophy    Skin: Impaired wound healing    Other: Stool output, Urine volume, Overall appearance    Follow Up  Time Spent (min): 60 minutes  Last Date of Nutrition Visit: 10/15/24  Nutrition Follow-Up Needed?: Dietitian to reassess per policy  Follow up Comment: CHERELLE CHIRINOS EN follow up [facility]

## 2024-10-15 NOTE — CARE PLAN
The patient's goals for the shift include      The clinical goals for the shift include pt phyllis be free from respiratory distress this shift    Problem: Fall/Injury  Goal: Not fall by end of shift  Outcome: Progressing  Goal: Be free from injury by end of the shift  Outcome: Progressing  Goal: Verbalize understanding of personal risk factors for fall in the hospital  Outcome: Progressing  Goal: Verbalize understanding of risk factor reduction measures to prevent injury from fall in the home  Outcome: Progressing  Goal: Use assistive devices by end of the shift  Outcome: Progressing  Goal: Pace activities to prevent fatigue by end of the shift  Outcome: Progressing     Problem: Skin  Goal: Decreased wound size/increased tissue granulation at next dressing change  Outcome: Progressing  Goal: Participates in plan/prevention/treatment measures  Outcome: Progressing  Goal: Prevent/manage excess moisture  Outcome: Progressing  Goal: Prevent/minimize sheer/friction injuries  Outcome: Progressing  Goal: Promote/optimize nutrition  Outcome: Progressing  Goal: Promote skin healing  Outcome: Progressing     Problem: Pain  Goal: Takes deep breaths with improved pain control throughout the shift  Outcome: Progressing  Goal: Turns in bed with improved pain control throughout the shift  Outcome: Progressing  Goal: Walks with improved pain control throughout the shift  Outcome: Progressing  Goal: Performs ADL's with improved pain control throughout shift  Outcome: Progressing  Goal: Participates in PT with improved pain control throughout the shift  Outcome: Progressing  Goal: Free from opioid side effects throughout the shift  Outcome: Progressing  Goal: Free from acute confusion related to pain meds throughout the shift  Outcome: Progressing     Problem: Respiratory  Goal: Clear secretions with interventions this shift  Outcome: Progressing  Goal: No signs of respiratory distress (eg. Use of accessory muscles. Peds  grunting)  Outcome: Progressing  Goal: Patent airway maintained this shift  Outcome: Progressing

## 2024-10-15 NOTE — DISCHARGE SUMMARY
Discharge Diagnosis  Aspiration pneumonia (Multi)    Issues Requiring Follow-Up  PCP follow-up    Discharge Meds     Your medication list        START taking these medications        Instructions Last Dose Given Next Dose Due   amoxicillin-pot clavulanate 875-125 mg tablet  Commonly known as: Augmentin      1 tablet by g-tube route 2 times a day for 4 days.       prednisoLONE sodium phosphate 15 mg/5 mL oral solution  Commonly known as: OrapRED      13.5 mL (40 mg) by g-tube route 2 times a day for 4 days.              CHANGE how you take these medications        Instructions Last Dose Given Next Dose Due   metoprolol tartrate 50 mg tablet  Commonly known as: Lopressor  What changed:   medication strength  how much to take  how to take this      1 tablet by g-tube route 2 times a day.              CONTINUE taking these medications        Instructions Last Dose Given Next Dose Due   acetaminophen 325 mg tablet  Commonly known as: TylenoL      Take 2 tablets (650 mg) by mouth every 6 hours if needed for mild pain (1 - 3).       amLODIPine 5 mg tablet  Commonly known as: Norvasc           baclofen 20 mg tablet  Commonly known as: Lioresal           baclofen 1,000 mcg/mL intrathecal injection  Commonly known as: Gablofen           bisacodyl 5 mg EC tablet  Commonly known as: Dulcolax           docusate sodium 50 mg/5 mL oral liquid  Commonly known as: Colace           Epidiolex 100 mg/mL solution  Generic drug: cannabidiol           ergocalciferol 1.25 MG (02248 UT) capsule  Commonly known as: Vitamin D-2           folic acid 1 mg tablet  Commonly known as: Folvite           ipratropium-albuteroL 0.5-2.5 mg/3 mL nebulizer solution  Commonly known as: Duo-Neb           levETIRAcetam 750 mg tablet  Commonly known as: Keppra           multivitamin tablet           Onfi 20 mg tablet  Generic drug: cloBAZam           polyethylene glycol 17 gram packet  Commonly known as: Glycolax, Miralax           senna 8.8 mg/5 mL  syrup  Commonly known as: Senokot           Valtoco 20 mg/2 spray (10mg/0.1mL x2) spray,non-aerosol nasal spray  Generic drug: diazePAM           Vimpat 200 mg tablet tablet  Generic drug: lacosamide           lacosamide 50 mg tablet  Commonly known as: Vimpat                  ASK your doctor about these medications        Instructions Last Dose Given Next Dose Due   oxyCODONE 5 mg immediate release tablet  Commonly known as: Roxicodone      Take 1 tablet (5 mg) by mouth every 6 hours if needed for severe pain (7 - 10).                 Where to Get Your Medications        These medications were sent to [id number] - [address] AT [address] [address]      Phone: [phone]   amoxicillin-pot clavulanate 875-125 mg tablet  metoprolol tartrate 50 mg tablet  prednisoLONE sodium phosphate 15 mg/5 mL oral solution         Test Results Pending At Discharge  Pending Labs       No current pending labs.            Procedures       Hospital Course   Vargas was admitted to hospital with concern for altered mental status per patient's mother.  Patient does have a history of cerebral palsy and at baseline is noncommunicative and does not really follow commands.  Patient was found to have aspiration pneumonia and was started on IV antibiotics symptoms did initially worsen and so he was evaluated by the infectious disease team as well as the pulmonology team.  He was kept on IV antibiotics and will be switching to a oral regimen that will be put through his PEG tube.  He was also evaluated by the neurology team as patient had new disconjugate eye rolling which the neurology team notes is likely related to his cerebral palsy.  It likely got worse in the setting of new pneumonia and should improve as the pneumonia clears up.  At this time he is otherwise hemodynamically stable and appropriate for discharge.  He was evaluated by the respiratory therapy  "group for home-going oxygen needs and does not qualify.  I did discuss with his mother that she should continue with the tube feeds for now as increased nutrition is likely to help him improve more rapidly.  She can go back to her usual tube feeds schedule with oral intake once his strength has improved.  She did note understanding.    Blood pressure 132/78, pulse 91, temperature 36.9 °C (98.5 °F), temperature source Axillary, resp. rate 18, height 1.6 m (5' 2.99\"), weight (!) 43.9 kg (96 lb 12.5 oz), SpO2 95%.  Pertinent Physical Exam At Time of Discharge  Physical Exam  Vitals and nursing note reviewed.   Constitutional:       General: He is not in acute distress.     Appearance: Normal appearance. He is not ill-appearing.   HENT:      Head: Normocephalic and atraumatic.      Mouth/Throat:      Mouth: Mucous membranes are moist.      Pharynx: Oropharynx is clear.   Eyes:      Extraocular Movements: Extraocular movements intact.      Conjunctiva/sclera: Conjunctivae normal.   Cardiovascular:      Rate and Rhythm: Normal rate and regular rhythm.      Heart sounds: Normal heart sounds. No murmur heard.     No friction rub. No gallop.   Pulmonary:      Effort: Pulmonary effort is normal.      Breath sounds: Normal breath sounds. No wheezing or rales.   Abdominal:      General: Bowel sounds are normal. There is no distension.      Palpations: Abdomen is soft.      Tenderness: There is no abdominal tenderness. There is no guarding.      Comments: Abdominal incision site, c/d/i   Musculoskeletal:         General: No swelling or tenderness.      Right lower leg: No edema.      Left lower leg: No edema.      Comments: Contracted limbs    Skin:     General: Skin is warm and dry.      Capillary Refill: Capillary refill takes less than 2 seconds.   Neurological:      General: No focal deficit present.      Mental Status: He is alert. Mental status is at baseline.   Psychiatric:         Mood and Affect: Mood normal. "         Outpatient Follow-Up  No future appointments.      Shiva Burgos DO FACFREDY     Time spent during this discharge: >30 min      Patients Discharge held up 2/2 family not ready to take care of him at home. Monitored overnight, family has made arrangements. DC home today. Actual DC date is 10/16/24.

## 2024-10-16 ENCOUNTER — PHARMACY VISIT (OUTPATIENT)
Dept: PHARMACY | Facility: CLINIC | Age: 29
End: 2024-10-16
Payer: MEDICAID

## 2024-10-16 VITALS
OXYGEN SATURATION: 97 % | HEART RATE: 98 BPM | SYSTOLIC BLOOD PRESSURE: 148 MMHG | TEMPERATURE: 97.9 F | RESPIRATION RATE: 17 BRPM | HEIGHT: 63 IN | WEIGHT: 83.5 LBS | BODY MASS INDEX: 14.79 KG/M2 | DIASTOLIC BLOOD PRESSURE: 84 MMHG

## 2024-10-16 PROCEDURE — 2500000002 HC RX 250 W HCPCS SELF ADMINISTERED DRUGS (ALT 637 FOR MEDICARE OP, ALT 636 FOR OP/ED): Performed by: INTERNAL MEDICINE

## 2024-10-16 PROCEDURE — 2500000004 HC RX 250 GENERAL PHARMACY W/ HCPCS (ALT 636 FOR OP/ED): Performed by: INTERNAL MEDICINE

## 2024-10-16 PROCEDURE — 2500000001 HC RX 250 WO HCPCS SELF ADMINISTERED DRUGS (ALT 637 FOR MEDICARE OP): Performed by: INTERNAL MEDICINE

## 2024-10-16 PROCEDURE — RXMED WILLOW AMBULATORY MEDICATION CHARGE

## 2024-10-16 PROCEDURE — 94669 MECHANICAL CHEST WALL OSCILL: CPT

## 2024-10-16 PROCEDURE — 2500000001 HC RX 250 WO HCPCS SELF ADMINISTERED DRUGS (ALT 637 FOR MEDICARE OP): Performed by: NURSE PRACTITIONER

## 2024-10-16 PROCEDURE — 2500000004 HC RX 250 GENERAL PHARMACY W/ HCPCS (ALT 636 FOR OP/ED): Performed by: NURSE PRACTITIONER

## 2024-10-16 PROCEDURE — 2500000001 HC RX 250 WO HCPCS SELF ADMINISTERED DRUGS (ALT 637 FOR MEDICARE OP)

## 2024-10-16 PROCEDURE — 94668 MNPJ CHEST WALL SBSQ: CPT

## 2024-10-16 PROCEDURE — 2500000005 HC RX 250 GENERAL PHARMACY W/O HCPCS: Performed by: NURSE PRACTITIONER

## 2024-10-16 PROCEDURE — 94640 AIRWAY INHALATION TREATMENT: CPT

## 2024-10-16 RX ORDER — METOPROLOL TARTRATE 50 MG/1
50 TABLET ORAL 2 TIMES DAILY
Qty: 60 TABLET | Refills: 0 | Status: SHIPPED | OUTPATIENT
Start: 2024-10-16 | End: 2024-11-15

## 2024-10-16 RX ORDER — PREDNISOLONE SODIUM PHOSPHATE 15 MG/5ML
40 SOLUTION ORAL 2 TIMES DAILY
Qty: 108 ML | Refills: 0 | Status: SHIPPED | OUTPATIENT
Start: 2024-10-16 | End: 2024-10-20

## 2024-10-16 RX ORDER — AMOXICILLIN AND CLAVULANATE POTASSIUM 875; 125 MG/1; MG/1
1 TABLET, FILM COATED ORAL 2 TIMES DAILY
Qty: 8 TABLET | Refills: 0 | Status: SHIPPED | OUTPATIENT
Start: 2024-10-16 | End: 2024-10-20

## 2024-10-16 ASSESSMENT — COGNITIVE AND FUNCTIONAL STATUS - GENERAL
DRESSING REGULAR LOWER BODY CLOTHING: TOTAL
CLIMB 3 TO 5 STEPS WITH RAILING: TOTAL
DAILY ACTIVITIY SCORE: 6
TURNING FROM BACK TO SIDE WHILE IN FLAT BAD: TOTAL
EATING MEALS: TOTAL
MOBILITY SCORE: 6
MOVING FROM LYING ON BACK TO SITTING ON SIDE OF FLAT BED WITH BEDRAILS: TOTAL
DRESSING REGULAR UPPER BODY CLOTHING: TOTAL
STANDING UP FROM CHAIR USING ARMS: TOTAL
HELP NEEDED FOR BATHING: TOTAL
MOVING TO AND FROM BED TO CHAIR: TOTAL
PERSONAL GROOMING: TOTAL
WALKING IN HOSPITAL ROOM: TOTAL
TOILETING: TOTAL

## 2024-10-16 ASSESSMENT — PAIN - FUNCTIONAL ASSESSMENT: PAIN_FUNCTIONAL_ASSESSMENT: 0-10

## 2024-10-16 ASSESSMENT — PAIN SCALES - WONG BAKER
WONGBAKER_NUMERICALRESPONSE: NO HURT
WONGBAKER_NUMERICALRESPONSE: NO HURT

## 2024-10-16 ASSESSMENT — PAIN SCALES - GENERAL: PAINLEVEL_OUTOF10: 0 - NO PAIN

## 2024-10-16 NOTE — NURSING NOTE
Patient was scheduled to be discharged today.   However, after discussion with patient's mother, she advised that she needed more help with patient at home, felt that he was still lethargic and needed additional time at the hospital.   Dr. Edwards advised and discharge order was discontinued.   Case management to determine what additional resources might be provided to patient upon discharge.    Gretel Du, WILLIAMSN, RN

## 2024-10-16 NOTE — PROGRESS NOTES
10/16/24 0838   Discharge Planning   Expected Discharge Disposition Home     I met with this patient's mother at the bedside to discuss discharge planning, she stated she is working with someone to get her a bobby lift, her son is going to help her transport him, she does plan on taking the patient home today and is requesting that she receives his discharge medications with her today. Updated provider with above information.

## 2024-10-16 NOTE — CARE PLAN
Patient was discharged yesterday however mother wasn't sure she would be able to move him around her home without a lift. Monitored over night on acute events. Mother has made arrangements for help at home and will likely be getting a bobby lift soon. DC home today.     Shiva Burgos DO, [facility]   10/16/2024

## 2024-10-16 NOTE — CARE PLAN
The patient's goals for the shift include      The clinical goals for the shift include Maintain pt. comfort

## 2024-10-17 LAB
ATRIAL RATE: 143 BPM
P AXIS: 64 DEGREES
P OFFSET: 202 MS
P ONSET: 153 MS
PR INTERVAL: 130 MS
Q ONSET: 218 MS
QRS COUNT: 23 BEATS
QRS DURATION: 80 MS
QT INTERVAL: 278 MS
QTC CALCULATION(BAZETT): 429 MS
QTC FREDERICIA: 371 MS
R AXIS: 35 DEGREES
T AXIS: 61 DEGREES
T OFFSET: 357 MS
VENTRICULAR RATE: 143 BPM

## (undated) DEVICE — SUTURE, VICRYL, 2-0, 27 IN, SH, UNDYED

## (undated) DEVICE — DRAPE, INCISE, ANTIMICROBIAL, IOBAN 2, LARGE, 17 X 23 IN, DISPOSABLE, STERILE

## (undated) DEVICE — SYRINGE, MONOJECT, LUER LOCK, 3 CC, LF

## (undated) DEVICE — Device

## (undated) DEVICE — DRESSING, GAUZE, PETROLATUM, PATCH, XEROFORM, 1 X 8 IN, STERILE

## (undated) DEVICE — TUBING, SUCTION, CONNECTING, STERILE 0.25 X 120 IN., LF

## (undated) DEVICE — ADHESIVE, SKIN, LIQUIBAND EXCEED

## (undated) DEVICE — SYRINGE, 20 CC, LUER LOCK, MONOJECT, W/O CAP, LF

## (undated) DEVICE — GLOVE, SURGICAL, PROTEXIS PI , 6.5, PF, LF

## (undated) DEVICE — DRESSING, NON-ADHERENT, TELFA, OUCHLESS, 3 X 8 IN, STERILE

## (undated) DEVICE — SUTURE, VICRYL 0, 36 IN, CT-1, VIOLET

## (undated) DEVICE — DRAPE, TOWEL, STERI DRAPE, 17 X 11 IN, PLASTIC, STERILE

## (undated) DEVICE — GLOVE, SURGICAL, PROTEXIS PI BLUE W/NEUTHERA, 6.5, PF, LF

## (undated) DEVICE — SUTURE, MONOCRYL, 4-0, 27 IN, PS-2, UNDYED

## (undated) DEVICE — COVER, EQUIPMENT, CAMERA, 5 X 96 IN, DISPOSABLE

## (undated) DEVICE — APPLICATOR, CHLORAPREP, W/ORANGE TINT, 26ML

## (undated) DEVICE — DRESSING, TRANSPARENT, TEGADERM, 4 X 4-3/4 IN

## (undated) DEVICE — DRAPE, SHEET, LAPAROTOMY